# Patient Record
Sex: FEMALE | Race: OTHER
[De-identification: names, ages, dates, MRNs, and addresses within clinical notes are randomized per-mention and may not be internally consistent; named-entity substitution may affect disease eponyms.]

---

## 2019-01-22 ENCOUNTER — APPOINTMENT (OUTPATIENT)
Dept: OBGYN | Facility: CLINIC | Age: 59
End: 2019-01-22
Payer: MEDICAID

## 2019-01-22 VITALS
DIASTOLIC BLOOD PRESSURE: 65 MMHG | SYSTOLIC BLOOD PRESSURE: 110 MMHG | HEIGHT: 60 IN | BODY MASS INDEX: 23.56 KG/M2 | WEIGHT: 120 LBS

## 2019-01-22 DIAGNOSIS — Z78.9 OTHER SPECIFIED HEALTH STATUS: ICD-10-CM

## 2019-01-22 DIAGNOSIS — Z82.49 FAMILY HISTORY OF ISCHEMIC HEART DISEASE AND OTHER DISEASES OF THE CIRCULATORY SYSTEM: ICD-10-CM

## 2019-01-22 DIAGNOSIS — Z87.891 PERSONAL HISTORY OF NICOTINE DEPENDENCE: ICD-10-CM

## 2019-01-22 PROCEDURE — 99386 PREV VISIT NEW AGE 40-64: CPT

## 2019-01-22 NOTE — CHIEF COMPLAINT
[Initial Visit] : initial GYN visit [FreeTextEntry1] : Pt of Dr. Valente's. last exam 6/2017, at that time reported an affair by , std testing negative.  got tested 12/17 negative. They are working it out.\par Vit d 5000 qd\par Had MAURIZIO in 2000 for fibroids, has ovaries.\par

## 2019-01-22 NOTE — PHYSICAL EXAM
[Awake] : awake [Alert] : alert [Acute Distress] : no acute distress [LAD] : no lymphadenopathy [Thyroid Nodule] : no thyroid nodule [Goiter] : no goiter [Mass] : no breast mass [Nipple Discharge] : no nipple discharge [Axillary LAD] : no axillary lymphadenopathy [Soft] : soft [Tender] : non tender [Distended] : not distended [H/Smegaly] : no hepatosplenomegaly [Oriented x3] : oriented to person, place, and time [Depressed Mood] : not depressed [Flat Affect] : affect not flat [Vulvar Atrophy] : vulvar atrophy [Normal] : vagina [Atrophy] : atrophy [No Bleeding] : there was no active vaginal bleeding [Absent] : absent [Uterine Adnexae] : were not tender and not enlarged [No Tenderness] : no rectal tenderness [Occult Blood] : occult blood test from digital rectal exam was negative [RRR, No Murmurs] : RRR, no murmurs [CTAB] : CTAB

## 2019-01-22 NOTE — HISTORY OF PRESENT ILLNESS
[___ Year(s) Ago] : [unfilled] year(s) ago [Good] : being in good health [Healthy Diet] : a healthy diet [Regular Exercise] : regular exercise [Last Mammogram ___] : Last Mammogram was [unfilled] [Last Bone Density ___] : Last bone density studies [unfilled] [Last Colonoscopy ___] : Last colonoscopy [unfilled] [Postmenopausal] : is postmenopausal [Sexually Active] : is sexually active

## 2019-02-18 DIAGNOSIS — R92.2 INCONCLUSIVE MAMMOGRAM: ICD-10-CM

## 2020-10-26 ENCOUNTER — APPOINTMENT (OUTPATIENT)
Dept: GASTROENTEROLOGY | Facility: CLINIC | Age: 60
End: 2020-10-26

## 2020-10-29 ENCOUNTER — APPOINTMENT (OUTPATIENT)
Dept: GASTROENTEROLOGY | Facility: CLINIC | Age: 60
End: 2020-10-29
Payer: COMMERCIAL

## 2020-10-29 VITALS
HEART RATE: 60 BPM | HEIGHT: 60 IN | WEIGHT: 122 LBS | BODY MASS INDEX: 23.95 KG/M2 | DIASTOLIC BLOOD PRESSURE: 86 MMHG | SYSTOLIC BLOOD PRESSURE: 137 MMHG

## 2020-10-29 DIAGNOSIS — Z87.19 PERSONAL HISTORY OF OTHER DISEASES OF THE DIGESTIVE SYSTEM: ICD-10-CM

## 2020-10-29 DIAGNOSIS — G89.29 RIGHT LOWER QUADRANT PAIN: ICD-10-CM

## 2020-10-29 DIAGNOSIS — K21.9 GASTRO-ESOPHAGEAL REFLUX DISEASE W/OUT ESOPHAGITIS: ICD-10-CM

## 2020-10-29 DIAGNOSIS — R10.31 RIGHT LOWER QUADRANT PAIN: ICD-10-CM

## 2020-10-29 PROCEDURE — 99072 ADDL SUPL MATRL&STAF TM PHE: CPT

## 2020-10-29 PROCEDURE — 99213 OFFICE O/P EST LOW 20 MIN: CPT

## 2020-10-29 NOTE — ASSESSMENT
[FreeTextEntry1] : 61 yo female with exacerbation of IBS and GERD. Will continue PPI. Obtain CT for evaluation of right sided abdominal pain.

## 2020-10-29 NOTE — PHYSICAL EXAM

## 2020-10-29 NOTE — HISTORY OF PRESENT ILLNESS
[de-identified] : Ms. ANA WALLACE is a 60 year old female with history of irritable bowel syndrome and GERD. Patient has been doing clinically well until last week when after eating and taken out she developed abdominal pain and bloating. This is associated with increasing GERD symptoms. Patient has noted no anorexia or fevers. She has had some right-sided pain and is concerned about the etiology.\par

## 2021-02-04 ENCOUNTER — EMERGENCY (EMERGENCY)
Facility: HOSPITAL | Age: 61
LOS: 0 days | Discharge: OTHER INSTITUTION | End: 2021-02-04
Attending: EMERGENCY MEDICINE
Payer: COMMERCIAL

## 2021-02-04 ENCOUNTER — INPATIENT (INPATIENT)
Facility: HOSPITAL | Age: 61
LOS: 7 days | Discharge: HOME | DRG: 40 | End: 2021-02-12
Attending: HOSPITALIST | Admitting: PSYCHIATRY & NEUROLOGY
Payer: COMMERCIAL

## 2021-02-04 ENCOUNTER — TRANSCRIPTION ENCOUNTER (OUTPATIENT)
Age: 61
End: 2021-02-04

## 2021-02-04 VITALS
TEMPERATURE: 98 F | DIASTOLIC BLOOD PRESSURE: 85 MMHG | OXYGEN SATURATION: 97 % | RESPIRATION RATE: 20 BRPM | HEART RATE: 77 BPM | SYSTOLIC BLOOD PRESSURE: 116 MMHG

## 2021-02-04 VITALS
HEART RATE: 101 BPM | SYSTOLIC BLOOD PRESSURE: 138 MMHG | TEMPERATURE: 98 F | WEIGHT: 128.53 LBS | RESPIRATION RATE: 14 BRPM | HEIGHT: 63 IN | DIASTOLIC BLOOD PRESSURE: 73 MMHG | OXYGEN SATURATION: 100 %

## 2021-02-04 VITALS
DIASTOLIC BLOOD PRESSURE: 68 MMHG | RESPIRATION RATE: 18 BRPM | HEART RATE: 79 BPM | HEIGHT: 63 IN | SYSTOLIC BLOOD PRESSURE: 137 MMHG | OXYGEN SATURATION: 98 %

## 2021-02-04 DIAGNOSIS — I63.312 CEREBRAL INFARCTION DUE TO THROMBOSIS OF LEFT MIDDLE CEREBRAL ARTERY: ICD-10-CM

## 2021-02-04 DIAGNOSIS — R47.01 APHASIA: ICD-10-CM

## 2021-02-04 DIAGNOSIS — I63.9 CEREBRAL INFARCTION, UNSPECIFIED: ICD-10-CM

## 2021-02-04 DIAGNOSIS — Z20.822 CONTACT WITH AND (SUSPECTED) EXPOSURE TO COVID-19: ICD-10-CM

## 2021-02-04 LAB
ALBUMIN SERPL ELPH-MCNC: 4.3 G/DL — SIGNIFICANT CHANGE UP (ref 3.3–5)
ALP SERPL-CCNC: 54 U/L — SIGNIFICANT CHANGE UP (ref 40–120)
ALT FLD-CCNC: 31 U/L — SIGNIFICANT CHANGE UP (ref 12–78)
ANION GAP SERPL CALC-SCNC: 9 MMOL/L — SIGNIFICANT CHANGE UP (ref 5–17)
APPEARANCE UR: CLEAR — SIGNIFICANT CHANGE UP
APTT BLD: 30.8 SEC — SIGNIFICANT CHANGE UP (ref 27.5–35.5)
AST SERPL-CCNC: 22 U/L — SIGNIFICANT CHANGE UP (ref 15–37)
BASOPHILS # BLD AUTO: 0.05 K/UL — SIGNIFICANT CHANGE UP (ref 0–0.2)
BASOPHILS NFR BLD AUTO: 0.9 % — SIGNIFICANT CHANGE UP (ref 0–2)
BILIRUB SERPL-MCNC: 0.5 MG/DL — SIGNIFICANT CHANGE UP (ref 0.2–1.2)
BILIRUB UR-MCNC: NEGATIVE — SIGNIFICANT CHANGE UP
BUN SERPL-MCNC: 18 MG/DL — SIGNIFICANT CHANGE UP (ref 7–23)
CALCIUM SERPL-MCNC: 9.2 MG/DL — SIGNIFICANT CHANGE UP (ref 8.5–10.1)
CHLORIDE SERPL-SCNC: 108 MMOL/L — SIGNIFICANT CHANGE UP (ref 96–108)
CO2 SERPL-SCNC: 22 MMOL/L — SIGNIFICANT CHANGE UP (ref 22–31)
COLOR SPEC: YELLOW — SIGNIFICANT CHANGE UP
CREAT SERPL-MCNC: 0.79 MG/DL — SIGNIFICANT CHANGE UP (ref 0.5–1.3)
DIFF PNL FLD: ABNORMAL
EOSINOPHIL # BLD AUTO: 0.04 K/UL — SIGNIFICANT CHANGE UP (ref 0–0.5)
EOSINOPHIL NFR BLD AUTO: 0.7 % — SIGNIFICANT CHANGE UP (ref 0–6)
GLUCOSE SERPL-MCNC: 126 MG/DL — HIGH (ref 70–99)
GLUCOSE UR QL: NEGATIVE MG/DL — SIGNIFICANT CHANGE UP
HCT VFR BLD CALC: 41.6 % — SIGNIFICANT CHANGE UP (ref 34.5–45)
HGB BLD-MCNC: 14.4 G/DL — SIGNIFICANT CHANGE UP (ref 11.5–15.5)
IMM GRANULOCYTES NFR BLD AUTO: 0.2 % — SIGNIFICANT CHANGE UP (ref 0–1.5)
INR BLD: 0.98 RATIO — SIGNIFICANT CHANGE UP (ref 0.88–1.16)
KETONES UR-MCNC: NEGATIVE — SIGNIFICANT CHANGE UP
LEUKOCYTE ESTERASE UR-ACNC: NEGATIVE — SIGNIFICANT CHANGE UP
LYMPHOCYTES # BLD AUTO: 2.86 K/UL — SIGNIFICANT CHANGE UP (ref 1–3.3)
LYMPHOCYTES # BLD AUTO: 49.3 % — HIGH (ref 13–44)
MCHC RBC-ENTMCNC: 31.6 PG — SIGNIFICANT CHANGE UP (ref 27–34)
MCHC RBC-ENTMCNC: 34.6 GM/DL — SIGNIFICANT CHANGE UP (ref 32–36)
MCV RBC AUTO: 91.4 FL — SIGNIFICANT CHANGE UP (ref 80–100)
MONOCYTES # BLD AUTO: 0.37 K/UL — SIGNIFICANT CHANGE UP (ref 0–0.9)
MONOCYTES NFR BLD AUTO: 6.4 % — SIGNIFICANT CHANGE UP (ref 2–14)
NEUTROPHILS # BLD AUTO: 2.47 K/UL — SIGNIFICANT CHANGE UP (ref 1.8–7.4)
NEUTROPHILS NFR BLD AUTO: 42.5 % — LOW (ref 43–77)
NITRITE UR-MCNC: NEGATIVE — SIGNIFICANT CHANGE UP
PH UR: 6.5 — SIGNIFICANT CHANGE UP (ref 5–8)
PLATELET # BLD AUTO: 273 K/UL — SIGNIFICANT CHANGE UP (ref 150–400)
POTASSIUM SERPL-MCNC: 3.2 MMOL/L — LOW (ref 3.5–5.3)
POTASSIUM SERPL-SCNC: 3.2 MMOL/L — LOW (ref 3.5–5.3)
PROT SERPL-MCNC: 7.6 GM/DL — SIGNIFICANT CHANGE UP (ref 6–8.3)
PROT UR-MCNC: NEGATIVE MG/DL — SIGNIFICANT CHANGE UP
PROTHROM AB SERPL-ACNC: 11.5 SEC — SIGNIFICANT CHANGE UP (ref 10.6–13.6)
RBC # BLD: 4.55 M/UL — SIGNIFICANT CHANGE UP (ref 3.8–5.2)
RBC # FLD: 11.8 % — SIGNIFICANT CHANGE UP (ref 10.3–14.5)
SARS-COV-2 RNA SPEC QL NAA+PROBE: SIGNIFICANT CHANGE UP
SODIUM SERPL-SCNC: 139 MMOL/L — SIGNIFICANT CHANGE UP (ref 135–145)
SP GR SPEC: 1 — LOW (ref 1.01–1.02)
TROPONIN I SERPL-MCNC: <0.015 NG/ML — SIGNIFICANT CHANGE UP (ref 0.01–0.04)
UROBILINOGEN FLD QL: NEGATIVE MG/DL — SIGNIFICANT CHANGE UP
WBC # BLD: 5.8 K/UL — SIGNIFICANT CHANGE UP (ref 3.8–10.5)
WBC # FLD AUTO: 5.8 K/UL — SIGNIFICANT CHANGE UP (ref 3.8–10.5)

## 2021-02-04 PROCEDURE — 70450 CT HEAD/BRAIN W/O DYE: CPT | Mod: 26,77

## 2021-02-04 PROCEDURE — 70496 CT ANGIOGRAPHY HEAD: CPT

## 2021-02-04 PROCEDURE — 81001 URINALYSIS AUTO W/SCOPE: CPT

## 2021-02-04 PROCEDURE — 70498 CT ANGIOGRAPHY NECK: CPT

## 2021-02-04 PROCEDURE — 86900 BLOOD TYPING SEROLOGIC ABO: CPT

## 2021-02-04 PROCEDURE — 71045 X-RAY EXAM CHEST 1 VIEW: CPT | Mod: 26

## 2021-02-04 PROCEDURE — 99221 1ST HOSP IP/OBS SF/LOW 40: CPT | Mod: AI

## 2021-02-04 PROCEDURE — 85025 COMPLETE CBC W/AUTO DIFF WBC: CPT

## 2021-02-04 PROCEDURE — 99223 1ST HOSP IP/OBS HIGH 75: CPT

## 2021-02-04 PROCEDURE — 70498 CT ANGIOGRAPHY NECK: CPT | Mod: 26

## 2021-02-04 PROCEDURE — 0042T: CPT

## 2021-02-04 PROCEDURE — U0003: CPT

## 2021-02-04 PROCEDURE — 86901 BLOOD TYPING SEROLOGIC RH(D): CPT

## 2021-02-04 PROCEDURE — 99291 CRITICAL CARE FIRST HOUR: CPT | Mod: 25

## 2021-02-04 PROCEDURE — 99285 EMERGENCY DEPT VISIT HI MDM: CPT

## 2021-02-04 PROCEDURE — 84484 ASSAY OF TROPONIN QUANT: CPT

## 2021-02-04 PROCEDURE — 37195 THROMBOLYTIC THERAPY STROKE: CPT

## 2021-02-04 PROCEDURE — 99291 CRITICAL CARE FIRST HOUR: CPT

## 2021-02-04 PROCEDURE — 70450 CT HEAD/BRAIN W/O DYE: CPT

## 2021-02-04 PROCEDURE — 70496 CT ANGIOGRAPHY HEAD: CPT | Mod: 26

## 2021-02-04 PROCEDURE — 93010 ELECTROCARDIOGRAM REPORT: CPT

## 2021-02-04 PROCEDURE — 85730 THROMBOPLASTIN TIME PARTIAL: CPT

## 2021-02-04 PROCEDURE — 71045 X-RAY EXAM CHEST 1 VIEW: CPT

## 2021-02-04 PROCEDURE — U0005: CPT

## 2021-02-04 PROCEDURE — 85610 PROTHROMBIN TIME: CPT

## 2021-02-04 PROCEDURE — 80053 COMPREHEN METABOLIC PANEL: CPT

## 2021-02-04 PROCEDURE — 82962 GLUCOSE BLOOD TEST: CPT

## 2021-02-04 PROCEDURE — 87086 URINE CULTURE/COLONY COUNT: CPT

## 2021-02-04 PROCEDURE — 36415 COLL VENOUS BLD VENIPUNCTURE: CPT

## 2021-02-04 PROCEDURE — 86850 RBC ANTIBODY SCREEN: CPT

## 2021-02-04 PROCEDURE — 93005 ELECTROCARDIOGRAM TRACING: CPT

## 2021-02-04 RX ORDER — INFLUENZA VIRUS VACCINE 15; 15; 15; 15 UG/.5ML; UG/.5ML; UG/.5ML; UG/.5ML
0.5 SUSPENSION INTRAMUSCULAR ONCE
Refills: 0 | Status: DISCONTINUED | OUTPATIENT
Start: 2021-02-04 | End: 2021-02-12

## 2021-02-04 RX ORDER — SODIUM CHLORIDE 9 MG/ML
1000 INJECTION INTRAMUSCULAR; INTRAVENOUS; SUBCUTANEOUS
Refills: 0 | Status: DISCONTINUED | OUTPATIENT
Start: 2021-02-04 | End: 2021-02-05

## 2021-02-04 RX ORDER — ALTEPLASE 100 MG
5.2 KIT INTRAVENOUS ONCE
Refills: 0 | Status: COMPLETED | OUTPATIENT
Start: 2021-02-04 | End: 2021-02-04

## 2021-02-04 RX ORDER — ALTEPLASE 100 MG
47.2 KIT INTRAVENOUS ONCE
Refills: 0 | Status: COMPLETED | OUTPATIENT
Start: 2021-02-04 | End: 2021-02-04

## 2021-02-04 RX ORDER — ACETAMINOPHEN 500 MG
650 TABLET ORAL ONCE
Refills: 0 | Status: COMPLETED | OUTPATIENT
Start: 2021-02-04 | End: 2021-02-04

## 2021-02-04 RX ORDER — ATORVASTATIN CALCIUM 80 MG/1
80 TABLET, FILM COATED ORAL AT BEDTIME
Refills: 0 | Status: DISCONTINUED | OUTPATIENT
Start: 2021-02-04 | End: 2021-02-05

## 2021-02-04 RX ADMIN — SODIUM CHLORIDE 75 MILLILITER(S): 9 INJECTION INTRAMUSCULAR; INTRAVENOUS; SUBCUTANEOUS at 18:32

## 2021-02-04 RX ADMIN — ALTEPLASE 5.2 MILLIGRAM(S): KIT at 10:34

## 2021-02-04 RX ADMIN — ALTEPLASE 47.2 MILLIGRAM(S): KIT at 10:40

## 2021-02-04 RX ADMIN — ALTEPLASE 47.2 MILLIGRAM(S): KIT at 11:40

## 2021-02-04 RX ADMIN — Medication 650 MILLIGRAM(S): at 16:20

## 2021-02-04 RX ADMIN — ATORVASTATIN CALCIUM 80 MILLIGRAM(S): 80 TABLET, FILM COATED ORAL at 22:32

## 2021-02-04 RX ADMIN — ALTEPLASE 312 MILLIGRAM(S): KIT at 10:33

## 2021-02-04 NOTE — ED PROVIDER NOTE - CLINICAL SUMMARY MEDICAL DECISION MAKING FREE TEXT BOX
59 y/o female no known pmhx brought in by EMS for aphasia, symptoms started around 920. cod stroke called on arrival. finger stick in the 90s. normal strength sensation coordination however pt is aphasic, nih 2. will ct, cta, neuro, admit.

## 2021-02-04 NOTE — H&P ADULT - NSHPSOCIALHISTORY_GEN_ALL_CORE
, working as a , quit tobacco smoking 25 years ago, endorses 1 glass of wine per day, denies illicit drug use

## 2021-02-04 NOTE — ED PROVIDER NOTE - CLINICAL SUMMARY MEDICAL DECISION MAKING FREE TEXT BOX
59yo F denies pmh presents as transfer from [place] for L MCA stroke, s/p tPA 10:30am. Repeat CT head no contrast. Admit to neuro ICU.

## 2021-02-04 NOTE — ED PROVIDER NOTE - PHYSICAL EXAMINATION
Constitutional: NAD AAOx3  Eyes: PERRLA EOMI  Head: Normocephalic atraumatic  Mouth: MMM  Cardiac: regular rate   Resp: Lungs CTAB  GI: Abd s/nt/nd  Neuro: CN2-12 intact, nih 2. normal strength sensation and coordination.   Skin: No rashes Constitutional: mild distress AAOx3  Eyes: PERRLA EOMI  Head: Normocephalic atraumatic  Mouth: MMM  Cardiac: regular rate   Resp: Lungs CTAB  GI: Abd s/nt/nd  Neuro: CN2-12 intact, nih 2. normal strength sensation and coordination.   Skin: No rashes

## 2021-02-04 NOTE — CONSULT NOTE ADULT - ASSESSMENT
The patient is a 60y Female who is followed by neurology because of stroke    Left MCA stroke  symptoms resolved post alteplase  no clear risk factors  NSICU admission for post alteplase vitals and neurochecks per protocol  no antiplatelets or anticoagulant for 24 hours after alteplase administration  repeat head CT  24 hours after alteplase administration  MRI brain  TTE and eventual MICHELE/ILR likely  hypercoag workup as outpatient since she received alteplase  PT/OT/Speech therapy  NPO pending swallow eval  keep BP under 180/105  lipid panel  goal LDL <70  statin if LDL >70 and passes swallow eval  DVT ppx    discussed with Dr Adriano Boyer    will follow with you    Sheldon Randolph MD PhD   [id number]

## 2021-02-04 NOTE — H&P ADULT - ASSESSMENT
60 year old female with no PMH presents to [facility] with stroke like symptoms s/p TPA at 1035. CTA/CTP with thrombus distal to L MCA bifurication. Symptoms resolved s/p TPA.   NIH 0. MrS 0.     PLAN:  Neuro:  -s/p TPA 2/4 at 1035  -Admit to neuro ICU  -stroke neuro checks q1  -repeat CTH in 24 hours 2/5 at 1030 AM  -MRI  -pending stroke work up  -pending dysphagia screen  -PT/OT/ Speech  -if CTH negative for heme tomorrow, okay for aspirin daily    CV:  --180  -pending TTE  -Monitor on Telemetry  -pending lipid panel   chol___     LDL___    Respiratory:  -RA  -SPO2 >88  -Incentive Spirometry    GI:  -pending dysphagia screen    :  -voids    Heme:  -s/p TPA  -SCDS  -hold aspirin for 24 hours, repeat CT tomorrow, if stable and no bleed on CT, ok for aspirin    Endo:  pending A1C___ TSH___     ID:  Afebrile

## 2021-02-04 NOTE — H&P ADULT - ATTENDING COMMENTS
Pt seen and examined. Agree with above assessment and plan    post-tpa protocol  L M2 occlusion noted on CTA  MRI pending

## 2021-02-04 NOTE — CONSULT NOTE ADULT - ASSESSMENT
Patient is a 61 y/o female no known pmhx brought in by EMS for aphasia starting at 9:20 this morning. Patient unable to provide extensive history secondary to aphasia. She states she denies headache, weakness, numbness. visual changes. Head CT/CTA Brain reveals LEFT fronto-parietal penumbra and acute thrombus in Left MCA bifurcation.  NIHSS:2    A/P: L MCA stroke with acute thrombus  - Tpa pushed at 10:35am  - ED discuss transfer to [facility] for potential clot retrieval  - Neuro checks Q1 hour  - BP permissive   - NPO  - Discussed with ED team    Discussed with Dr. Bang

## 2021-02-04 NOTE — H&P ADULT - NSHPPHYSICALEXAM_GEN_ALL_CORE
Constitutional: NAD    Neuro  * Mental Status:  GCS 15:  E(4), V(5), M(6).  Awake, alert, oriented to conversation.  * Cranial Nerves: Cnii-Cnxii grossly intact. PERRL, EOMI, tongue midline, no gaze deviation  * Motor: RUE 4+/5, LUE 5/5, RLE 5/5, LLE 5/5, No drift  * Sensory: Sensation intact to light touch  * Reflexes: Not assessed  * Gait: Not assessed    Cardiovascular:  S1, S2 no murmurs appreciated.  Regular rate and rhythm.  Eyes: See neurologic examination with detailed examination of eyes.  ENT: No JVD, Trachea Midline.  Respiratory: Clear to auscultation.  Gastrointestinal: Soft, nontender, nondistended.  Genitourinary: [ ] Estrada, [ x ] No Estrada.   Musculoskeletal: No muscle wasting noted, (See neuorlogic assessment for full muscle strength assessment) No pretibial edema appreciated, no appreciable calf tenderness.  Skin:  Wound inspected, no redness, bleeding or drainage noted.    Hematologic / Lymph / Immunologic: No bleeding from IV sites or wounds, No lymphadenopathy, No Hives or allergic type skin lesions Depressed

## 2021-02-04 NOTE — ED ADULT NURSE NOTE - OBJECTIVE STATEMENT
Pt BIBA for expressive aphasia and difficulty word finding.  noticed a change of speech around 0920 this am. On arrival pt a&ox2 having difficulty finding words, denies pain or discomfort. Denies recent surgeries, denies any recent illness, denies any weakness or numbness. Denies headache, blurred vision. MD De La Cruz at bedside, code stroke initiated at 1005. Pt to be taken to CT scan. BEVERLEY REDDYL.

## 2021-02-04 NOTE — ED PROVIDER NOTE - PROGRESS NOTE DETAILS
Scribe Jaclyn Casale for attending Dr De La Cruz: transfer center called regarding CTA. Scribe Jaclyn Casale for attending Dr De La Cruz: called by radiology again, CT with signs of clots in MCA, no inclusive but there is a penumbra. neurox at bedside. evaluated pt agrees with plan for TPA. discussed with pt, she had multiple questions which we answered, discussed risks and benefits, after discussion pt agreeable for TPA. TPA administered at 10:35. Scribe Jaclyn Casale for attending Dr De La Cruz: called by radiology, CT head without any signs of bleed. pharmacy at bedside mixing TPA. Scribe Jaclyn Casale for attending Dr De La Cruz: spoke with transfer center, they are having trouble getting in touch with neuro-intervention, they will call me back. spoke with [facility] Dr. Soler transfer to them. Nehemias De La Cruz M.D., Attending Physician

## 2021-02-04 NOTE — CONSULT NOTE ADULT - SUBJECTIVE AND OBJECTIVE BOX
HPI:  Patient is a 61 y/o female no known pmhx brought in by EMS for aphasia starting at 9:20 this morning. Patient unable to provide extensive history secondary to aphasia. She states she denies headache, weakness, numbness. visual changes. Denies DOAC use. Head CT/CTA Brain reveals LEFT fronto-parietal penumbra and acute thrombus in L MCA bifurcation.  NIHSS:2    PAST MEDICAL & SURGICAL HISTORY:  None    FAMILY HISTORY: unable to assess     Social Hx:  Nonsmoker, no drug or alcohol use    MEDICATIONS  (STANDING):       Allergies  No Known Allergies  Intolerances        ROS: Pertinent positives in HPI, all other ROS were reviewed and are negative.      Vital Signs Last 24 Hrs  T(C): 36.8 (04 Feb 2021 10:32), Max: 36.8 (04 Feb 2021 10:05)  T(F): 98.2 (04 Feb 2021 10:32), Max: 98.3 (04 Feb 2021 10:05)  HR: 99 (04 Feb 2021 10:32) (99 - 101)  BP: 140/81 (04 Feb 2021 10:32) (138/73 - 140/81)  BP(mean): 98 (04 Feb 2021 10:32) (98 - 98)  RR: 20 (04 Feb 2021 10:32) (14 - 20)  SpO2: 100% (04 Feb 2021 10:32) (100% - 100%)      PHYSICAL EXAM:  Constitutional: awake and alert  HEENT: PERRLA, EOMI  Neck: Supple.  Respiratory: Breath sounds are clear bilaterally  Cardiovascular: S1 and S2, regular rhythm  Gastrointestinal: soft, nontender  Extremities:  no edema  Vascular: Caritid Bruit - no  Musculoskeletal: no joint swelling/tenderness, no abnormal movements  Skin: No rashes    Neurological exam:  HF: Awake, alert. Expressive aphasia, mild dysarthria.   CN: ESTEFANI, EOMI, VFF, facial sensation normal, no NLFD, tongue midline, Palate moves equally, SCM equal bilaterally  Motor: No pronator drift, Strength 5/5 in all 4 ext, normal bulk and tone, no tremor, rigidity or bradykinesia.    Sens: Intact to light touch    Reflexes: Symmetric and normal . BJ 2+, BR 2+, KJ 2+, AJ 2+, downgoing toes b/l  Coord:  No FNFA, dysmetria, SANDY intact   Gait/Balance: Cannot test    NIHSS:2          Labs:                        14.4   5.80  )-----------( 273      ( 04 Feb 2021 10:09 )             41.6     02-04    139  |  108  |  18  ----------------------------<  126<H>  3.2<L>   |  22  |  0.79    Ca    9.2      04 Feb 2021 10:09    TPro  7.6  /  Alb  4.3  /  TBili  0.5  /  DBili  x   /  AST  22  /  ALT  31  /  AlkPhos  54  02-04        PT/INR - ( 04 Feb 2021 10:09 )   PT: 11.5 sec;   INR: 0.98 ratio         PTT - ( 04 Feb 2021 10:09 )  PTT:30.8 sec    Radiology report:  CT/CTA Brain Stroke Protocol (02.04.21 @ 10:12)  IMPRESSION:  No acute infarction. 67 ml penumbra in the left frontal parietal lobe. Acute thrombus just past the left MCA bifurcation at the level of the insular ribbon. Occluded left internal carotid artery 1.5 cm from its bifurcation extending cranially to the level of the petrous segment.

## 2021-02-04 NOTE — ED ADULT NURSE NOTE - CHIEF COMPLAINT QUOTE
patient presents with expressive aphasia last known well is 0920 am as per . Positive BEFAST= expressive asphasia

## 2021-02-04 NOTE — ED ADULT NURSE NOTE - OBJECTIVE STATEMENT
pt A&Ox4, transfer from [place] after TPA was given, LKW at 920am with  found her aphasic original NIH-2 , pt went to [place] and ct showed acute thrombus @Left MCA bifurcation and occluded left carotid artery pt given TPA @1030am Sx resolved and arrived in ED with NIH-0, resp even and unlabored no distress noted, abd soft NTND

## 2021-02-04 NOTE — ED PROVIDER NOTE - NS_ ATTENDINGSCRIBEDETAILS _ED_A_ED_FT
I, Nehemias De La Cruz MD,  performed the initial face to face bedside interview with this patient regarding history of present illness, review of symptoms and relevant past medical, social and family history.  I completed an independent physical examination.  I was the initial provider who evaluated this patient.  The history, relevant review of systems, past medical and surgical history, medical decision making, and physical examination was documented by the scribe in my presence and I attest to the accuracy of the documentation.

## 2021-02-04 NOTE — ED PROVIDER NOTE - NS ED ROS FT
Constitutional: no fever, sweats, and no chills.  Eyes: no pain, no redness, and no visual changes.  ENMT: no neck pain, no stiffness  CV: no chest pain, no edema.  Resp: no cough, no dyspnea  GI: no abdominal pain, no nausea and no vomiting.  MSK: no msk pain, no weakness  Skin: no lesions, and no rashes.  Neuro: no LOC, no headache, no sensory deficits, +aphasia

## 2021-02-04 NOTE — ED PROVIDER NOTE - OBJECTIVE STATEMENT
61 y/o female no known pmhx brought in by EMS for aphasia, symptoms started around 920. finger stick in trauma was in the 90s on arrival. Code stroke called.

## 2021-02-04 NOTE — H&P ADULT - HISTORY OF PRESENT ILLNESS
60 year old right hand dominant female with no PMHx transferred from  with aphasia and right arm weakness that started at 920 this am. She states she was feeling in her usual state of health up until this morning when she was unable to speak to her  and became weak with her right arm. CODE Stroke was called at , NIH 2 and she received TPA at 1035. CTA/ CTP revealing thrombus distal to the L MCA Bifurication, 67ml of penumbra to left frontoparietal lobe. Pt transferred to [facility] for possible mechanical thrombectomy. Upon arrival, pt with improved symptoms, NIH 0. Pt does endorse recent sinus infection 2 weeks ago and completing abx course amoxicillin. Pt denies HA, dizziness, paresthesias, visual changes, difficulty with word finding, weakness.     NIH 0  mrS 0

## 2021-02-04 NOTE — CONSULT NOTE ADULT - SUBJECTIVE AND OBJECTIVE BOX
[facility]                                     Neurology at [place]                                 Tomasa Recinos, & Noé                                  [address]                                        [place], [place], [address]                                             [phone]    CC: aphasia  HPI:  The patient is a 60y Female who presented as transfer from [facility] for stroke s/p alteplase.  At 0920 she became acutely aphasic with right face and arm weakness/funny feeling.  She had NIHSS=2. She had head CT that did not show blood or large stroke and was given alteplase at 1035, CTA head showed distal left MCA thrombus and CTP showed 67 cc penumbra.  she was ultimately transferred to [facility] for further care.    PAST MEDICAL & SURGICAL HISTORY:  No pertinent past medical history    No significant past surgical history        MEDICATIONS  (STANDING):  atorvastatin 80 milliGRAM(s) Oral at bedtime  sodium chloride 0.9%. 1000 milliLiter(s) (75 mL/Hr) IV Continuous <Continuous>    MEDICATIONS  (PRN):      Allergies    No Known Allergies    Intolerances        SOCIAL HISTORY:  no tob,   no alcohol   no drugs    FAMILY HISTORY:  FH: stroke/TIA  father    FH: myocardial infarction (Father)  father          ROS: 14 point ROS negative other than what is present in HPI or below    Vital Signs Last 24 Hrs  T(C): 36.7 (04 Feb 2021 13:03), Max: 36.8 (04 Feb 2021 10:05)  T(F): 98 (04 Feb 2021 13:03), Max: 98.3 (04 Feb 2021 10:05)  HR: 75 (04 Feb 2021 17:00) (64 - 101)  BP: 108/72 (04 Feb 2021 17:00) (107/91 - 140/81)  BP(mean): 98 (04 Feb 2021 10:32) (98 - 98)  RR: 17 (04 Feb 2021 17:00) (14 - 21)  SpO2: 98% (04 Feb 2021 17:00) (97% - 100%)      General: NAD    Detailed Neurologic Exam:    Mental status: The patient is awake and alert and has normal attention span.  The patient is fully oriented in 3 spheres. The patient is oriented to current events. The patient is able to name objects, follow commands, repeat sentences.    Cranial nerves: Pupils equal and react symmetrically to light. There is no visual field deficit to confrontation. Extraocular motion is full with no nystagmus. There is no ptosis. Facial sensation is intact. Facial musculature is symmetric. Palate elevates symmetrically. Shoulder shrug is normal. Tongue is midline.    Motor: There is normal bulk and tone.  There is no tremor.  Strength is 5/5 in the right arm and leg.   Strength is 5/5 in the left arm and leg.    Sensation: Intact to light touch and pin in 4 extremities    Reflexes: 2+ throughout and plantar responses are flexor.    Cerebellar: There is no dysmetria on finger to nose testing.    Gait : deferred    NIH SS:  DATE: 2/4/21  TIME: 1445  1A: Level of consciousness (0-3): 0  1B: Questions (0-2): 0  1C: Commands (0-2): 0  2: Gaze (0-2): 0  3: Visual fields (0-3): 0  4: Facial palsy (0-3): 0  MOTOR:  5A: Left arm motor drift (0-4): 0  5B: Right arm motor drift (0-4): 0  6A: Left leg motor drift (0-4): 0  6B: Right leg motor drift (0-4): 0  7: Limb ataxia (0-2): 0  SENSORY:  8: Sensation (0-2): 0  SPEECH:  9: Language (0-3): 0  10: Dysarthria (0-2): 0  EXTINCTION:  11: Extinction/inattention (0-2): 0    TOTAL SCORE: 0    prehospital mRS= 0      LABS:                         14.4   5.80  )-----------( 273      ( 04 Feb 2021 10:09 )             41.6       02-04    139  |  108  |  18  ----------------------------<  126<H>  3.2<L>   |  22  |  0.79    Ca    9.2      04 Feb 2021 10:09    TPro  7.6  /  Alb  4.3  /  TBili  0.5  /  DBili  x   /  AST  22  /  ALT  31  /  AlkPhos  54  02-04      PT/INR - ( 04 Feb 2021 10:09 )   PT: 11.5 sec;   INR: 0.98 ratio         PTT - ( 04 Feb 2021 10:09 )  PTT:30.8 sec    RADIOLOGY & ADDITIONAL STUDIES (independently reviewed unless otherwise noted):  CT head: no acute CVA, mass or blood  CTA head: no aneurysm, AVM,  or sig stenosis in COW, distal left MCA thrombus  CTA neck: no sig carotid or vertebral stenosis  CT Perfusion head - CBF<30% volume 0ml, Tmax>6s volume =67ml

## 2021-02-04 NOTE — STROKE CODE NOTE - MRS SCORE
Strong peripheral pulses
(1) No significant disability. Able to carry out all usual activities, despite some symptoms.

## 2021-02-04 NOTE — H&P ADULT - NSHPREVIEWOFSYSTEMS_GEN_ALL_CORE
Constitutional:  denies fever, chills, generalized weakness  Eyes: No double vision, no change in visual acuity, no blurry vision, no occular discharge  Neurologic: right arm weakness improved, does endorse frontal sinus congestion improved from 2 weeks ago s/p abx, no seizure reported  Ears, nose, mouth throat:  No ottorrhea. No change in hearing, No anosmia, No oral lesions, No sore throat  Cardiovascular: No palpitations, no chest pain, no nocturnal or positional dyspnea.  Respiratory: No shortness of breath, No Cough  Gastrointestinal: No change in bowel habits, no change in appetite  Genitourinary: No Frequency, No Dysuria, no Hematuria  Musculoskeletal: No muscle wasting, No new weakness  Psych: No changes in mood, Denies drug use, Denies history of psychiatric illness  Integumentary: Denies new skin lesions  Endocrine: Denies history of DM, denies history of thyroid disease, No heat or cold intolerance  Heme/Lymph: No use of antiplatelet/anticoagulant  Allergic / Immune: No active allergies unless otherwise specified in medical chart    All other systems reviewed and are negative

## 2021-02-04 NOTE — H&P ADULT - NSHPLABSRESULTS_GEN_ALL_CORE
14.4   5.80  )-----------( 273      ( 04 Feb 2021 10:09 )             41.6       PT/INR - ( 04 Feb 2021 10:09 )   PT: 11.5 sec;   INR: 0.98 ratio         PTT - ( 04 Feb 2021 10:09 )  PTT:30.8 sec    02-04    139  |  108  |  18  ----------------------------<  126<H>  3.2<L>   |  22  |  0.79    Ca    9.2      04 Feb 2021 10:09    TPro  7.6  /  Alb  4.3  /  TBili  0.5  /  DBili  x   /  AST  22  /  ALT  31  /  AlkPhos  54  02-04    < from: CT Perfusion w/ Maps w/ IV Cont (02.04.21 @ 10:24) >    TA BRAIN: Thrombus just past the left MCA bifurcation at the level of the insular ribbon on image 33 of series 3  The [place] of [place] and vertebrobasilar system are otherwise in normal without evidence of stenosis, additional occlusion or saccular aneurysm dilation. No evidence for arterial venous malformation. The right vertebral artery is dominant.    CTA NECK:  Occluded left internal carotid artery 1.5 cm from its bifurcation extending cranially to the level of the petrous segment.  A left-sided aortic arch is demonstrated. There is normal relationship to the great vessels. The common carotid arteries, right internal carotid artery and vertebral arteries are normal in caliber. No evidence of stenosis, occlusion or saccular aneurysm dilation. The right vertebral artery is dominant.    CT PERFUSION:  Patient has only had less than 2 hours of symptoms may influence the CT perfusion.    CBF<30% volume: 0 ml  Tmax>6.0 s volume: 67 ml  Mismatch volume: 67 ml  Mismatch ratio: Infinite    < end of copied text >

## 2021-02-04 NOTE — ED PROVIDER NOTE - OBJECTIVE STATEMENT
61yo F denies pmh presents as transfer from [place] for L MCA stroke. At 9:20am, patient developed aphasia and drooling. Presented to [place], received tPA at 10:30am. L MCA stroke on imaging. Transferred for further neuro care. Currently asymptomatic, NIHSS 0. Denies paresthesias, weakness, HA. Speaking clearly, AAOx3. 60 year old F denies pmh presents as transfer from [place] for L MCA stroke. At 9:20am, patient developed aphasia and drooling. Presented to [place], received tPA at 10:30am. L MCA stroke on imaging. Transferred for further neuro care. Currently asymptomatic, NIHSS 0. Denies paresthesias, weakness, HA. Speaking clearly, AAOx3.

## 2021-02-04 NOTE — ED PROVIDER NOTE - FAMILY HISTORY
FH: stroke, father     Father  Still living? Unknown  FH: myocardial infarction, Age at diagnosis: Age Unknown

## 2021-02-04 NOTE — ED PROVIDER NOTE - NS ED ROS FT
Review of Systems:  	•	CONSTITUTIONAL: no fever  	•	SKIN: no rash  	•	RESPIRATORY: no shortness of breath  	•	CARDIAC: no chest pain  	•	GI:  no abd pain, no nausea, no vomiting, no diarrhea  	•	GENITO-URINARY:  no dysuria  	•	MUSCULOSKELETAL:  no back pain  	•	NEUROLOGIC: no weakness +aphasia   	•	ALLERGY: no rhinorrhea  	•	PSYSCHIATRIC: appropriate concern about symptoms Constitutional: No fever or chills  Eyes: No visual changes  HEENT: No throat pain  CV: No chest pain  Resp: No SOB no cough  GI: No abd pain, nausea or vomiting  : No dysuria  MSK: No musculoskeletal pain  Skin: No rash  Neuro: No headache +aphasia

## 2021-02-04 NOTE — ED PROVIDER NOTE - PHYSICAL EXAMINATION
General: well appearing, NAD  Head:  NC, AT  Eyes: EOMI, PERRLA  Cardiac: RRR, no m/r/g, no lower extremity edema  Respiratory: CTABL, no wheezes/rales/rhonchi, equal chest wall expansions  Abdomen: soft, ND, NT  MSK/Vascular: full ROM, distal pulses intact, soft compartments, warm extremities  Neuro: AAOx3, negative pronator drift, strength 5/5, sensation to light touch intact, finger to nose coordination intact, cranial nerves 2-12 intact  Psych: calm, cooperative, normal affect, speech clear and thought process linear

## 2021-02-05 LAB
A1C WITH ESTIMATED AVERAGE GLUCOSE RESULT: 5.1 % — SIGNIFICANT CHANGE UP (ref 4–5.6)
ANION GAP SERPL CALC-SCNC: 10 MMOL/L — SIGNIFICANT CHANGE UP (ref 5–17)
BUN SERPL-MCNC: 11 MG/DL — SIGNIFICANT CHANGE UP (ref 8–20)
CALCIUM SERPL-MCNC: 8.4 MG/DL — LOW (ref 8.6–10.2)
CHLORIDE SERPL-SCNC: 110 MMOL/L — HIGH (ref 98–107)
CHOLEST SERPL-MCNC: 169 MG/DL — SIGNIFICANT CHANGE UP
CO2 SERPL-SCNC: 22 MMOL/L — SIGNIFICANT CHANGE UP (ref 22–29)
CREAT SERPL-MCNC: 0.6 MG/DL — SIGNIFICANT CHANGE UP (ref 0.5–1.3)
CULTURE RESULTS: SIGNIFICANT CHANGE UP
ESTIMATED AVERAGE GLUCOSE: 100 MG/DL — SIGNIFICANT CHANGE UP (ref 68–114)
GLUCOSE SERPL-MCNC: 103 MG/DL — HIGH (ref 70–99)
HCT VFR BLD CALC: 36.8 % — SIGNIFICANT CHANGE UP (ref 34.5–45)
HCV AB S/CO SERPL IA: 0.07 S/CO — SIGNIFICANT CHANGE UP (ref 0–0.99)
HCV AB SERPL-IMP: SIGNIFICANT CHANGE UP
HDLC SERPL-MCNC: 64 MG/DL — SIGNIFICANT CHANGE UP
HGB BLD-MCNC: 12.6 G/DL — SIGNIFICANT CHANGE UP (ref 11.5–15.5)
LIPID PNL WITH DIRECT LDL SERPL: 93 MG/DL — SIGNIFICANT CHANGE UP
MAGNESIUM SERPL-MCNC: 2 MG/DL — SIGNIFICANT CHANGE UP (ref 1.6–2.6)
MCHC RBC-ENTMCNC: 31.4 PG — SIGNIFICANT CHANGE UP (ref 27–34)
MCHC RBC-ENTMCNC: 34.2 GM/DL — SIGNIFICANT CHANGE UP (ref 32–36)
MCV RBC AUTO: 91.8 FL — SIGNIFICANT CHANGE UP (ref 80–100)
NON HDL CHOLESTEROL: 105 MG/DL — SIGNIFICANT CHANGE UP
PHOSPHATE SERPL-MCNC: 3.2 MG/DL — SIGNIFICANT CHANGE UP (ref 2.4–4.7)
PLATELET # BLD AUTO: 222 K/UL — SIGNIFICANT CHANGE UP (ref 150–400)
POTASSIUM SERPL-MCNC: 3.6 MMOL/L — SIGNIFICANT CHANGE UP (ref 3.5–5.3)
POTASSIUM SERPL-SCNC: 3.6 MMOL/L — SIGNIFICANT CHANGE UP (ref 3.5–5.3)
RBC # BLD: 4.01 M/UL — SIGNIFICANT CHANGE UP (ref 3.8–5.2)
RBC # FLD: 11.6 % — SIGNIFICANT CHANGE UP (ref 10.3–14.5)
SODIUM SERPL-SCNC: 142 MMOL/L — SIGNIFICANT CHANGE UP (ref 135–145)
SPECIMEN SOURCE: SIGNIFICANT CHANGE UP
TRIGL SERPL-MCNC: 62 MG/DL — SIGNIFICANT CHANGE UP
TSH SERPL-MCNC: 2.51 UIU/ML — SIGNIFICANT CHANGE UP (ref 0.27–4.2)
WBC # BLD: 6.94 K/UL — SIGNIFICANT CHANGE UP (ref 3.8–10.5)
WBC # FLD AUTO: 6.94 K/UL — SIGNIFICANT CHANGE UP (ref 3.8–10.5)

## 2021-02-05 PROCEDURE — 70551 MRI BRAIN STEM W/O DYE: CPT | Mod: 26

## 2021-02-05 PROCEDURE — 99233 SBSQ HOSP IP/OBS HIGH 50: CPT

## 2021-02-05 PROCEDURE — 70450 CT HEAD/BRAIN W/O DYE: CPT | Mod: 26

## 2021-02-05 PROCEDURE — 93306 TTE W/DOPPLER COMPLETE: CPT | Mod: 26

## 2021-02-05 PROCEDURE — 99223 1ST HOSP IP/OBS HIGH 75: CPT

## 2021-02-05 PROCEDURE — 99232 SBSQ HOSP IP/OBS MODERATE 35: CPT

## 2021-02-05 PROCEDURE — 99222 1ST HOSP IP/OBS MODERATE 55: CPT

## 2021-02-05 RX ORDER — ATORVASTATIN CALCIUM 80 MG/1
80 TABLET, FILM COATED ORAL AT BEDTIME
Refills: 0 | Status: DISCONTINUED | OUTPATIENT
Start: 2021-02-05 | End: 2021-02-05

## 2021-02-05 RX ORDER — FLUTICASONE PROPIONATE 50 MCG
1 SPRAY, SUSPENSION NASAL
Refills: 0 | Status: DISCONTINUED | OUTPATIENT
Start: 2021-02-05 | End: 2021-02-12

## 2021-02-05 RX ORDER — CALCIUM GLUCONATE 100 MG/ML
1 VIAL (ML) INTRAVENOUS ONCE
Refills: 0 | Status: COMPLETED | OUTPATIENT
Start: 2021-02-05 | End: 2021-02-05

## 2021-02-05 RX ORDER — ENOXAPARIN SODIUM 100 MG/ML
40 INJECTION SUBCUTANEOUS
Refills: 0 | Status: DISCONTINUED | OUTPATIENT
Start: 2021-02-05 | End: 2021-02-06

## 2021-02-05 RX ORDER — ATORVASTATIN CALCIUM 80 MG/1
40 TABLET, FILM COATED ORAL AT BEDTIME
Refills: 0 | Status: DISCONTINUED | OUTPATIENT
Start: 2021-02-05 | End: 2021-02-12

## 2021-02-05 RX ORDER — FLUTICASONE PROPIONATE 50 MCG
1 SPRAY, SUSPENSION NASAL
Qty: 0 | Refills: 0 | DISCHARGE

## 2021-02-05 RX ORDER — SODIUM CHLORIDE 9 MG/ML
1000 INJECTION INTRAMUSCULAR; INTRAVENOUS; SUBCUTANEOUS ONCE
Refills: 0 | Status: COMPLETED | OUTPATIENT
Start: 2021-02-05 | End: 2021-02-05

## 2021-02-05 RX ORDER — POTASSIUM CHLORIDE 20 MEQ
40 PACKET (EA) ORAL ONCE
Refills: 0 | Status: COMPLETED | OUTPATIENT
Start: 2021-02-05 | End: 2021-02-05

## 2021-02-05 RX ORDER — ASPIRIN/CALCIUM CARB/MAGNESIUM 324 MG
81 TABLET ORAL DAILY
Refills: 0 | Status: DISCONTINUED | OUTPATIENT
Start: 2021-02-05 | End: 2021-02-12

## 2021-02-05 RX ORDER — ACETAMINOPHEN 500 MG
650 TABLET ORAL EVERY 6 HOURS
Refills: 0 | Status: DISCONTINUED | OUTPATIENT
Start: 2021-02-05 | End: 2021-02-12

## 2021-02-05 RX ADMIN — Medication 650 MILLIGRAM(S): at 11:54

## 2021-02-05 RX ADMIN — Medication 40 MILLIEQUIVALENT(S): at 06:41

## 2021-02-05 RX ADMIN — SODIUM CHLORIDE 1000 MILLILITER(S): 9 INJECTION INTRAMUSCULAR; INTRAVENOUS; SUBCUTANEOUS at 00:35

## 2021-02-05 RX ADMIN — Medication 81 MILLIGRAM(S): at 11:52

## 2021-02-05 RX ADMIN — Medication 100 GRAM(S): at 11:52

## 2021-02-05 RX ADMIN — Medication 650 MILLIGRAM(S): at 20:19

## 2021-02-05 RX ADMIN — ENOXAPARIN SODIUM 40 MILLIGRAM(S): 100 INJECTION SUBCUTANEOUS at 18:16

## 2021-02-05 RX ADMIN — ATORVASTATIN CALCIUM 40 MILLIGRAM(S): 80 TABLET, FILM COATED ORAL at 20:19

## 2021-02-05 NOTE — PROGRESS NOTE ADULT - ASSESSMENT
61 y/o F with no PMHx, 3-5K runner daily, presented to [facility] with aphasia, R arm weekend, s/p tPA; transferred to [facility] for possible mechanical thrombectomy.    #Left MCA Infarct   - right face and arm weakness, aphasia  -s/p tPA 2/4 at 1035 at [facility]  -Admitted to neuro ICU for post alteplase vitals and neuro-checks per protocol  - Repeat head CT 24 hours post tPA stable today  - MRI brain pending  - TTE with bubble study pending  - Hypercoagulable workup as outpatient since she received alteplase.  - PT/OT/Speech therapy.  - Keep BP under 180/105  - Lipid panel noted, A1c 5.1  - start ASA and statin today  - start DVT prophylaxis per neurology  - neuro recs appreciated  - cardiology recs appreciated  - PT/OT  - speech eval passed    #prophylaxis  - DVT - lovenox  - GI - none   59 y/o F with no PMHx, 3-5K runner daily, presented to [facility] with aphasia, R arm weekend, s/p tPA; transferred to [facility] for possible mechanical thrombectomy.    #Left MCA Infarct   - right face and arm weakness, aphasia  - s/p tPA 2/4 at 1035 at [facility]  - Admitted to neuro ICU for post alteplase vitals and neuro-checks per protocol  - Repeat head CT 24 hours post tPA stable today  - MRI brain pending  - TTE with bubble study pending  - Hypercoagulable workup as outpatient since she received alteplase.  - PT/OT/Speech therapy.  - Keep BP under 180/105  - Lipid panel noted, A1c 5.1  - start ASA and statin today  - start DVT prophylaxis per neurology  - neuro recs appreciated  - cardiology recs appreciated  - PT/OT  - speech eval passed    #prophylaxis  - DVT - lovenox  - GI - none   59 y/o F with no PMHx, 3-5K runner daily, presented to [facility] with aphasia, R arm weekend, s/p tPA; transferred to [facility] for possible mechanical thrombectomy.    #Left MCA Infarct   - right face and arm weakness, aphasia  - s/p tPA 2/4 at 1035 at [facility]  - Admitted to neuro ICU for post alteplase vitals and neuro-checks per protocol  - CTA neck showed left ICA occlusion, vascular sx consulted   - Repeat head CT 24 hours post tPA stable today  - MRI brain pending  - TTE with bubble study pending  - Hypercoagulable workup as outpatient since she received alteplase.  - PT/OT/Speech therapy.  - Keep BP under 180/105  - Lipid panel noted, A1c 5.1  - start ASA and statin today  - start DVT prophylaxis per neurology  - neuro recs appreciated  - cardiology recs appreciated  - PT/OT  - speech eval passed    #prophylaxis  - DVT - lovenox  - GI - none

## 2021-02-05 NOTE — PROGRESS NOTE ADULT - ASSESSMENT
ASSESSMENT/PLAN:    NEURO:  Activity: [] OOB as tolerated [] Bedrest [] PT [] OT [] PMNR [] Bed in Chair Position     PULM:    CV:  SBP goal    RENAL:  Fluids:    GI:  Diet:  GI prophylaxis [] not indicated [] PPI [] other:  Bowel regimen [] colace [] senna [] other:    ENDO:   Goal euglycemia (-180)    HEME/ONC:  VTE prophylaxis: [] SCDs [] chemoprophylaxis    ID: afebrile    SOCIAL/FAMILY:  [] updated at bedside [] family meeting    CODE STATUS:  [] Full Code [] DNR [] DNI [] Palliative/Comfort Care    DISPOSITION:  [] ICU [] Stroke Unit [] Floor [] EMU [] RCU [] PCU    [] Patient is at high risk of neurologic deterioration/death due to:     Time seen:  Time spent: ___ [] critical care minutes ASSESSMENT/PLAN:  60 year old female with no PMH presents to [facility] with stroke like symptoms s/p TPA at 1035. CTA/CTP with thrombus distal to L MCA bifurication. Symptoms resolved s/p TPA.   NIH 0. MrS 0.     PLAN:  Neuro:  -s/p TPA 2/4 at 1035  -Admit to neuro ICU  -stroke neuro checks q1  -repeat CTH in 24 hours 2/5 at 1030 AM  -MRI  -pending stroke work up  -pending dysphagia screen  -PT/OT/ Speech  -if CTH negative for heme tomorrow, okay for aspirin daily    CV:  --180  -pending TTE  -Monitor on Telemetry  -pending lipid panel     Respiratory:  -RA  -SPO2 >88  -Incentive Spirometry    GI:  -pending dysphagia screen    :  -voids    Heme:  -s/p TPA  -SCDS  -hold aspirin for 24 hours, repeat CT tomorrow, if stable and no bleed on CT, ok for aspirin    Endo:  pending A1C- TSH-     ID:  Afebrile    SOCIAL/FAMILY:  [X] updated at bedside    CODE STATUS:  [X] Full Code    DISPOSITION:  [X] ICU     [X] Patient is at high risk of neurologic deterioration/death due to:     Time spent: 35 critical care minutes ASSESSMENT/PLAN:  60 year old female with no PMH presents to [facility] with stroke like symptoms s/p TPA at 1035. CTA/CTP with thrombus distal to L MCA bifurication. Symptoms resolved s/p TPA.   NIH 0. MrS 0.     PLAN:  Neuro:  -s/p TPA 2/4 at 1035  -Admit to neuro ICU  -stroke neuro checks q2  -repeat CTH as above   -MRI  -pending stroke work up  dysphagia screen- passed  -PT/OT/ Speech   aspirin daily    CV:  - cardiac eval - ? Loop recorder   --180  -pending TTE--> MICHELE  -Monitor on Telemetry  - LDL as above     Respiratory:  -RA  -SPO2 >92  -Incentive Spirometry    GI:  Passed dysphagia screen     :  No gerber   Cr stable     Heme:  -s/p TPA  -SCDS/  lovenox 40mg q day    Endo:  pending A1C- 5.1  TSH- Nl      ID: Chronic sinusitis   Flonase  q day  Afebrile    SOCIAL/FAMILY:  [X] updated at bedside    CODE STATUS:  [X] Full Code    DISPOSITION:  [X] ICU     [X] Patient is not critically ill yet medically complex    Time spent: 35 critical care minutes

## 2021-02-05 NOTE — CONSULT NOTE ADULT - SUBJECTIVE AND OBJECTIVE BOX
[place] [facility]                                                               Office:  [address] [address]                                                              Telephone: [phone]. Fax:[phone]                                                                        CARDIOLOGY CONSULTATION NOTE                                                                                             Consult requested by:    Reason for Consultation:   History obtained by: Patient and medical record   obtained: No  Cardiologist:    Chief complaint:    Patient is a 60y old  Female who presents with a chief complaint of stroke (2021 10:07)        HPI:  60 year old right hand dominant female with no PMHx transferred from  with aphasia and right arm weakness that started at 920 this am. She states she was feeling in her usual state of health up until this morning when she was unable to speak to her  and became weak with her right arm. CODE Stroke was called at , NIH 2 and she received TPA at 1035. CTA/ CTP revealing thrombus distal to the L MCA Bifurication, 67ml of penumbra to left frontoparietal lobe. Pt transferred to [facility] for possible mechanical thrombectomy. Upon arrival, pt with improved symptoms, NIH 0. Pt does endorse recent sinus infection 2 weeks ago and completing abx course amoxicillin. Pt denies HA, dizziness, paresthesias, visual changes, difficulty with word finding, weakness.     NIH 0  mrS 0 (2021 15:09)        REVIEW OF SYMPTOMS:     CONSTITUTIONAL: No fever, no weight loss, no fatigue, no weight gain   ENMT: No difficulty hearing, tinnitus, vertigo; No sinus or throat pain  NECK: No pain or stiffness  CARDIOVASCULAR: No chest pain, no dyspnea, no syncope, no palpitations, no dizziness, no Orthopnea, no Paroxsymal nocturnal dyspnea  RESPIRATORY: No Dyspnea on exertion, no Shortness of breath, no cough, no wheezing  : No dysuria, no hematuria   GI: No dark color stool, no melena, no diarrhea, no constipation, no abdominal pain   NEURO: No headache, no dizziness, no slurred speech   MUSCULOSKELETAL: No joint pain or swelling; No muscle, back, or extremity pain  PSYCH: No agitation, no anxiety.    ALL OTHER REVIEW OF SYSTEMS ARE NEGATIVE.        ALLERGIES: Allergies  No Known Allergies  Intolerances        PAST MEDICAL HISTORY  No pertinent past medical history      PAST SURGICAL HISTORY  No significant past surgical history        FAMILY HISTORY:  FH: stroke, CABG father  myocardial infarction (Father)          SOCIAL HISTORY:  Denies smoking/alcohol/drugs  CIGARETTES:  Former smoker   ALCOHOL: Social  DRUGS: Denies      CURRENT MEDICATIONS:  aspirin  chewable  enoxaparin Injectable  fluticasone propionate 50 MICROgram(s)/spray Nasal Spray  influenza   Vaccine      HOME MEDICATIONS:      Vital Signs Last 24 Hrs  T(C): 36.7 (2021 08:00), Max: 36.8 (2021 12:03)  T(F): 98.1 (2021 08:00), Max: 98.3 (2021 03:15)  HR: 75 (2021 10:35) (55 - 81)  BP: 107/68 (2021 10:35) (97/63 - 137/68)  BP(mean): 86 (2021 07:35) (74 - 102)  RR: 16 (2021 10:35) (12 - 27)  SpO2: 98% (2021 10:35) (95% - 100%)      PHYSICAL EXAM:  Constitutional: Comfortable. No acute distress.   HEENT: Atraumatic and normocephalic, neck is supple . no JVD.   CNS: A&Ox3. No focal deficits. EOMI.   Respiratory: CTAB, unlabored   Cardiovascular: S1S2 RRR. No murmur/rubs or gallop.  Gastrointestinal: Soft non-tender and non distended . +Bowel sounds.   Extremities: No edema.   Psychiatric: Calm . no agitation.  Skin: No skin rash/ulcers visualized to face, hands or feet.    Intake and output:    @ 07:01  -  -05 @ 07:00  --------------------------------------------------------  IN: 2140 mL / OUT: 0 mL / NET: 2140 mL        LABS:                        12.6   6.94  )-----------( 222      ( 2021 05:10 )             36.8         142  |  110<H>  |  11.0  ----------------------------<  103<H>  3.6   |  22.0  |  0.60    Ca    8.4<L>      2021 05:10  Phos  3.2     02-05  Mg     2.0     02-05    TPro  7.6  /  Alb  4.3  /  TBili  0.5  /  DBili  x   /  AST  22  /  ALT  31  /  AlkPhos  54  02-04    CARDIAC MARKERS ( 2021 10:09 )  <0.015 ng/mL / x     / x     / x     / x          PT/INR - ( 2021 10:09 )   PT: 11.5 sec;   INR: 0.98 ratio         PTT - ( 2021 10:09 )  PTT:30.8 sec  Urinalysis Basic - ( 2021 10:13 )    Color: Yellow / Appearance: Clear / S.005 / pH: x  Gluc: x / Ketone: Negative  / Bili: Negative / Urobili: Negative mg/dL   Blood: x / Protein: Negative mg/dL / Nitrite: Negative   Leuk Esterase: Negative / RBC: 0-2 /HPF / WBC Negative   Sq Epi: x / Non Sq Epi: Occasional / Bacteria: Occasional        INTERPRETATION OF TELEMETRY: Reviewed by me.   ECG: Reviewed by me.     RADIOLOGY & ADDITIONAL STUDIES:    X-ray:  reviewed by me.   CT scan:   < from: CT Angio Neck w/ IV Cont (21 @ 10:24) >  FINDINGS:    NONCONTRAST CT HEAD:  Hyperdensity in the left MCA bifurcation image 16 of series 5 suggesting thrombus.  There is no compelling evidence for an acute transcortical infarction. There is no evidence of mass, mass effect, midline shift or extra-axial fluid collection. The lateral ventricles and cortical sulci are age-appropriate in size and configuration. The orbits, mastoid air cells and visualized paranasal sinuses are normal. The calvarium is intact. Consider MRI for further evaluation.      CTA BRAIN: Thrombus just past the left MCA bifurcation at the level of the insular ribbon on image 33 of series 3  The [place] and vertebrobasilar system are otherwise in normal without evidence of stenosis, additional occlusion or saccular aneurysm dilation. No evidence for arterial venous malformation. The right vertebral artery is dominant.    CTA NECK:  Occluded left internal carotid artery 1.5 cm from its bifurcation extending cranially to the level of the petrous segment.  A left-sided aortic arch is demonstrated. There is normal relationship to the great vessels. The common carotid arteries, right internal carotid artery and vertebral arteries are normal in caliber. No evidence of stenosis, occlusion or saccular aneurysm dilation. The right vertebral artery is dominant.    CT PERFUSION:  Patient has only had less than 2 hours of symptoms may influence the CT perfusion.    CBF<30% volume: 0 ml  Tmax>6.0 s volume: 67 ml  Mismatch volume: 67 ml  Mismatch ratio: Infinite      IMPRESSION:    No acute infarction. 67 ml penumbra in the left frontal parietal lobe. Acute thrombus just past the left MCA bifurcation at the level of the insular ribbon. Occluded left internal carotid artery 1.5 cm from its bifurcation extending cranially to the level of the petrous segment. Findings discussed with Dr. De La Cruz at 10:24 AM.        < end of copied text >    MRI:                                                                          [place] [facility]                                                               Office:  [address] [address]                                                              Telephone: [phone]. Fax:[phone]                                                                        CARDIOLOGY CONSULTATION NOTE                                                                                             Consult requested by:  Dr. fried  Reason for Consultation: CVA  History obtained by: Patient and medical record   obtained: No  Cardiologist: Dr angelita Lockhart    Chief complaint:    Patient is a 60y old  Female who presents with a chief complaint of stroke (2021 10:07)      HPI: 61 y/o F with no PMHx, 3-5K runner daily, presents to  with aphasia, R arm weekend, Code stroke called- pt administered TPA at that tiem. CTA shows L MCA stroke. Pt transfered to [facility] for possible mechanical throbectomy. Symptoms have since resolved. Pt follows with Dr. Lockhart- cardiologist . Last stress test 2016-normal. TTE- "leaky valve, ut normal". former smoker, social drinker. Tele- SR, no arrythmia.       REVIEW OF SYMPTOMS:   CONSTITUTIONAL: No fever, no weight loss, no fatigue, no weight gain   ENMT: No difficulty hearing, tinnitus, vertigo; No sinus or throat pain  NECK: No pain or stiffness  CARDIOVASCULAR: No chest pain, no dyspnea, no syncope, no palpitations, no dizziness, no Orthopnea, no Paroxsymal nocturnal dyspnea  RESPIRATORY: No Dyspnea on exertion, no Shortness of breath, no cough, no wheezing  : No dysuria, no hematuria   GI: No dark color stool, no melena, no diarrhea, no constipation, no abdominal pain   NEURO: No headache, no dizziness, no slurred speech   MUSCULOSKELETAL: No joint pain or swelling; No muscle, back, or extremity pain  PSYCH: No agitation, no anxiety.    ALL OTHER REVIEW OF SYSTEMS ARE NEGATIVE.        ALLERGIES: Allergies  No Known Allergies  Intolerances        PAST MEDICAL HISTORY  No pertinent past medical history      PAST SURGICAL HISTORY  No significant past surgical history        FAMILY HISTORY:  FH: stroke, CABG father  myocardial infarction (Father)          SOCIAL HISTORY:  Denies smoking/alcohol/drugs  CIGARETTES:  Former smoker   ALCOHOL: Social  DRUGS: Denies      CURRENT MEDICATIONS:  aspirin  chewable  enoxaparin Injectable  fluticasone propionate 50 MICROgram(s)/spray Nasal Spray  influenza   Vaccine      HOME MEDICATIONS:      Vital Signs Last 24 Hrs  T(C): 36.7 (2021 08:00), Max: 36.8 (2021 12:03)  T(F): 98.1 (2021 08:00), Max: 98.3 (2021 03:15)  HR: 75 (2021 10:35) (55 - 81)  BP: 107/68 (2021 10:35) (97/63 - 137/68)  BP(mean): 86 (2021 07:35) (74 - 102)  RR: 16 (2021 10:35) (12 - 27)  SpO2: 98% (2021 10:35) (95% - 100%)      PHYSICAL EXAM:  Constitutional: Comfortable. No acute distress.   HEENT: Atraumatic and normocephalic, neck is supple . no JVD.   CNS: A&Ox3. No focal deficits. EOMI.   Respiratory: CTAB, unlabored   Cardiovascular: S1S2 RRR. No murmur/rubs or gallop.  Gastrointestinal: Soft non-tender and non distended . +Bowel sounds.   Extremities: No edema.   Psychiatric: Calm . no agitation.  Skin: No skin rash/ulcers visualized to face, hands or feet.    Intake and output:   -04 @ 07:01  -  02-05 @ 07:00  --------------------------------------------------------  IN: 2140 mL / OUT: 0 mL / NET: 2140 mL        LABS:                        12.6   6.94  )-----------( 222      ( 2021 05:10 )             36.8     02-05    142  |  110<H>  |  11.0  ----------------------------<  103<H>  3.6   |  22.0  |  0.60    Ca    8.4<L>      2021 05:10  Phos  3.2     02-05  Mg     2.0     02-05    TPro  7.6  /  Alb  4.3  /  TBili  0.5  /  DBili  x   /  AST  22  /  ALT  31  /  AlkPhos  54  02-04    CARDIAC MARKERS ( 2021 10:09 )  <0.015 ng/mL / x     / x     / x     / x          PT/INR - ( 2021 10:09 )   PT: 11.5 sec;   INR: 0.98 ratio         PTT - ( 2021 10:09 )  PTT:30.8 sec  Urinalysis Basic - ( 2021 10:13 )    Color: Yellow / Appearance: Clear / S.005 / pH: x  Gluc: x / Ketone: Negative  / Bili: Negative / Urobili: Negative mg/dL   Blood: x / Protein: Negative mg/dL / Nitrite: Negative   Leuk Esterase: Negative / RBC: 0-2 /HPF / WBC Negative   Sq Epi: x / Non Sq Epi: Occasional / Bacteria: Occasional        INTERPRETATION OF TELEMETRY: Reviewed by me.   ECG: Reviewed by me.     RADIOLOGY & ADDITIONAL STUDIES:    X-ray:  reviewed by me.   CT scan:   < from: CT Angio Neck w/ IV Cont (21 @ 10:24) >  FINDINGS:    NONCONTRAST CT HEAD:  Hyperdensity in the left MCA bifurcation image 16 of series 5 suggesting thrombus.  There is no compelling evidence for an acute transcortical infarction. There is no evidence of mass, mass effect, midline shift or extra-axial fluid collection. The lateral ventricles and cortical sulci are age-appropriate in size and configuration. The orbits, mastoid air cells and visualized paranasal sinuses are normal. The calvarium is intact. Consider MRI for further evaluation.      CTA BRAIN: Thrombus just past the left MCA bifurcation at the level of the insular ribbon on image 33 of series 3  The [place] and vertebrobasilar system are otherwise in normal without evidence of stenosis, additional occlusion or saccular aneurysm dilation. No evidence for arterial venous malformation. The right vertebral artery is dominant.    CTA NECK:  Occluded left internal carotid artery 1.5 cm from its bifurcation extending cranially to the level of the petrous segment.  A left-sided aortic arch is demonstrated. There is normal relationship to the great vessels. The common carotid arteries, right internal carotid artery and vertebral arteries are normal in caliber. No evidence of stenosis, occlusion or saccular aneurysm dilation. The right vertebral artery is dominant.    CT PERFUSION:  Patient has only had less than 2 hours of symptoms may influence the CT perfusion.    CBF<30% volume: 0 ml  Tmax>6.0 s volume: 67 ml  Mismatch volume: 67 ml  Mismatch ratio: Infinite      IMPRESSION:    No acute infarction. 67 ml penumbra in the left frontal parietal lobe. Acute thrombus just past the left MCA bifurcation at the level of the insular ribbon. Occluded left internal carotid artery 1.5 cm from its bifurcation extending cranially to the level of the petrous segment. Findings discussed with Dr. De La Cruz at 10:24 AM.        < end of copied text >    MRI:

## 2021-02-05 NOTE — DISCHARGE NOTE NURSING/CASE MANAGEMENT/SOCIAL WORK - PATIENT PORTAL LINK FT
You can access the FollowMyHealth Patient Portal offered by [facility] by registering at the following website: [email or web address]. By joining [employer]’s FollowMyHealth portal, you will also be able to view your health information using other applications (apps) compatible with our system.

## 2021-02-05 NOTE — PROGRESS NOTE ADULT - SUBJECTIVE AND OBJECTIVE BOX
SUMMARY:HPI:  60 year old right hand dominant female with no PMHx transferred from  with aphasia and right arm weakness that started at 920 this am. She states she was feeling in her usual state of health up until this morning when she was unable to speak to her  and became weak with her right arm. CODE Stroke was called at , NIH 2 and she received TPA at 1035. CTA/ CTP revealing thrombus distal to the L MCA Bifurication, 67ml of penumbra to left frontoparietal lobe. Pt transferred to [facility] for possible mechanical thrombectomy. Upon arrival, pt with improved symptoms, NIH 0. Pt does endorse recent sinus infection 2 weeks ago and completing abx course amoxicillin. Pt denies HA, dizziness, paresthesias, visual changes, difficulty with word finding, weakness.     2/4/21- Aphasic ; R sided sensory loss- [facility]- 2    2/6/2121- Aphasic  and  decreased sensation RUE and LE-- NSCU admission  NIH =1 - aphasia    NIH 0  mrS 0 (04 Feb 2021 15:09)    CLINICAL TRIALS:  Allergies    No Known Allergies    Intolerances        REVIEW OF SYSTEMS: [X ] Unable to Assess due to neurologic exam    Neuro: [ ] Headache [ ] Back pain [ ] Numbness [ ] Weakness [ ] Ataxia [ ] Dizziness [ X] Aphasia [ ] Dysarthria [ ] Visual disturbance  Resp: [ ] Shortness of breath/dyspnea, [ ] Orthopnea [ ] Cough  CV: [ ] Chest pain [ ] Palpitation [ ] Lightheadedness [ ] Syncope  Renal: [ ] Thirst [ ] Edema  GI: [ ] Nausea [ ] Emesis [ ] Abdominal pain [ ] Constipation [ ] Diarrhea  Hem: [ ] Hematemesis [ ] bright red blood per rectum  ID: [ ] Fever [ ] Chills [ ] Dysuria  ENT: [ ] Rhinorrhea    DEVICES:   [ ] Restraints [ ] ET tube [ ] central line [ ] arterial line [ ] gerber [ ] NGT/OGT [ ] EVD [ ] LD [ ] SASHA/HMV [ ] Trach [ ] PEG [ ] Chest Tube     VITALS: [ ] Reviewed  Vital Signs Last 24 Hrs  T(C): 36.8 (05 Feb 2021 03:15), Max: 36.8 (04 Feb 2021 10:05)  T(F): 98.3 (05 Feb 2021 03:15), Max: 98.3 (04 Feb 2021 10:05)  HR: 55 (05 Feb 2021 06:35) (55 - 101)  BP: 97/73 (05 Feb 2021 06:35) (97/63 - 140/81)  BP(mean): 81 (05 Feb 2021 06:35) (74 - 102)  RR: 15 (05 Feb 2021 06:35) (12 - 27)  SpO2: 97% (05 Feb 2021 06:35) (95% - 100%)  CAPILLARY BLOOD GLUCOSE  92 (04 Feb 2021 11:08)      POCT Blood Glucose.: 101 mg/dL (04 Feb 2021 14:11)  POCT Blood Glucose.: 92 mg/dL (04 Feb 2021 10:04)      MEDICATIONS  (STANDING):  MEDICATIONS  (STANDING):  aspirin  chewable 81 milliGRAM(s) Oral daily  atorvastatin 20 milliGRAM(s) Oral at bedtime  fluticasone propionate 50 MICROgram(s)/spray Nasal Spray 1 Spray(s) Both Nostrils two times a day  heparin  Infusion 1000 Unit(s)/Hr (10 mL/Hr) IV Continuous <Continuous>  influenza   Vaccine 0.5 milliLiter(s) IntraMuscular once  sodium chloride 0.9% Bolus 1000 milliLiter(s) IV Bolus once  sodium chloride 0.9%. 1000 milliLiter(s) (75 mL/Hr) IV Continuous <Continuous>    MEDICATIONS  (PRN):  acetaminophen   Tablet .. 650 milliGRAM(s) Oral every 6 hours PRN Temp greater or equal to 38C (100.4F), Mild Pain (1 - 3)      MEDICATIONS  (PRN):  acetaminophen   Tablet .. 650 milliGRAM(s) Oral every 6 hours PRN Temp greater or equal to 38C (100.4F), Mild Pain (1 - 3)    I  05 Feb 2021 07:01  -  06 Feb 2021 07:00  --------------------------------------------------------  IN:    IV PiggyBack: 50 mL    Oral Fluid: 720 mL  Total IN: 770 mL    OUT:    Voided (mL): 300 mL  Total OUT: 300 mL    Total NET: 470 mL      06 Feb 2021 07:01  -  06 Feb 2021 13:28  --------------------------------------------------------  IN:  Heparin: 10 mL  Total IN: 10 mL    OUT:  Total OUT: 0 mL    Total NET: 10 mL        LABS:  PT/INR - ( 04 Feb 2021 10:09 )   PT: 11.5 sec;   INR: 0.98 ratio         PTT - ( 04 Feb 2021 10:09 )  PTT:30.8 sec  02-05    142  |  110<H>  |  11.0  ----------------------------<  103<H>  3.6   |  22.0  |  0.60    Ca    8.4<L>      05 Feb 2021 05:10  Phos  3.2     02-05  Mg     2.0     02-05    TPro  7.6  /  Alb  4.3  /  TBili  0.5  /  DBili  x   /  AST  22  /  ALT  31  /  AlkPhos  54  02-04                          12.6   6.94  )-----------( 222      ( 05 Feb 2021 05:10 )             36.8       STROKE CORE MEASURES:      MEDICATION LEVELS:     IMAGING/DATA: [ X] Reviewed    CT Perfusion w/ Maps w/ IV Cont (02.06.21 @ 12:27) >  INTERPRETATION:  Clinical indication: Code stroke.    Following injection of approximately 90 cc of Omnipaque 350 IV CT perfusion was performed    CBF<30%:0 ml  Tmax>6.0s: 60 mL  Mismatch volume: 60 mL  Mismatch ratio: Infinite.    IMPRESSION: CT perfusion as described above.     CT Angio Neck w/ IV Cont (02.06.21 @ 10:38) >  INTERPRETATION:  Clinical indications: Aphasia.    Multiple axial sections were performed from base of skull to vertex without contrast enhancement. Coronal and sagittal reconstructions were performed as well.    The size and configuration of the ventricles appear unchanged    Old lacunar infarct versus prominent VR space is again seen involving the left lenticular nucleus region    There is no evidence acute hemorrhage mass mass effect in the posterior fossa or supratentorial.    Evaluation of the structures with the appropriate window appears normal    The visualized paranasal sinuses mastoid and middle ear appear clear.    IMPRESSION: No significant change when allowing for differences in technique.    Findings discussed with Dr Elder on 3/6/2021 11:31 AM.  with read back.    The patient was injected with approximately 90 cc of Omnipaque 350 IV and CTA of the neck and Takotna Weiss were performed. Coronal and sagittal reconstructions were performed as well.    Evaluation of both distal common carotid, proximal internal and external carotid arteries appear normal as well as both vertebral arteries. No significant stenosis is seen.    There is normal flow involving both distal internal carotid arteries. Evaluation of the anterior cerebral, right middle cerebral basilar and posterior cerebral arteries appear normal. Prominent P-comm is seen involving the right side. There is decreased flow related enhancement involving the left M2 and M3 segments which is likely compatible areas of occlusion.    The dural venous sinuses demonstrate normal enhancement.    Evaluation of the soft tissue neck region appears normal    The visualized portion of both lung apices appear clear.    IMPRESSION:   CT angiogram of the neck appears normal.    Decreased flow related enhancement is seen involving the left M2 and M3 segmen            EXAMINATION:  PHYSICAL EXAM:    Constitutional: No Acute Distress     Neurological: Awake, alert oriented to person, place and time, Following Commands, PERRL, EOMI, No Gaze Preference, Face Symmetrical, Mild aphasia , No dysmetria, No ataxia, No nystagmus     Motor exam:          Upper extremity                         Delt     Bicep     Tricep    HG                                                 R         5/5        5/5        5/5       5/5                                               L          5/5        5/5        5/5       5/5          Lower extremity                        HF         KF        KE       DF         PF                                                  R        5/5        5/5        5/5       5/5         5/5                                               L         5/5        5/5       5/5       5/5          5/5                                                 Sensation: [X ] slight decrease RUE/LE [ ] decreased:     Pulmonary: Clear to Auscultation, No rales, No rhonchi, No wheezes     Cardiovascular: S1, S2, Regular rate and rhythm     Gastrointestinal: Soft, Non-tender, Non-distended     Extremities: No calf tenderness

## 2021-02-05 NOTE — CONSULT NOTE ADULT - ATTENDING COMMENTS
60F with acute L-MCA infarct s/p TPA, CTA neck found with occluded L-ICA 1.5 cm from the bifurcation extends cranially to the petrous segment, likely the culprit for the acute CVA, doubt cardioembolic source, EKG and tele in sinus  -await TTE with bubble study, doubt would need MICHELE/ILR given carotid as likely the source of CVA, consider vascular surgery consult and neurology follow up  -continue ASA/statin      Juan Chaudhry DO, [facility]  Faculty Non-Invasive Cardiologist  [phone] 60F with acute L-MCA infarct s/p TPA, CTA neck found with occluded L-ICA 1.5 cm from the bifurcation extends cranially to the petrous segment, likely the culprit for the acute CVA, doubt cardioembolic source would cause the complete occlusion of the ICA, EKG and tele in sinus  -await TTE with bubble study, doubt would need MICHELE/ILR given carotid as likely the source of CVA, consider vascular surgery consult and neurology follow up  -continue ASA/statin      Ji Darwin Chaudhry DO, [facility]  Faculty Non-Invasive Cardiologist  [phone]

## 2021-02-05 NOTE — PROGRESS NOTE ADULT - ASSESSMENT
60y Female who is followed by neurology because of stroke    Left MCA stroke  Symptoms resolved post alteplase  No clear risk factors  NSICU admission for post alteplase vitals and neuro-checks per protocol.  Hold antiplatelets or anticoagulant for 24 hours after alteplase administration.  Repeat head CT  24 hours after alteplase administration  MRI brain  TTE and eventual MICHELE/ILR likely  Hypercoagulable workup as outpatient since she received alteplase.  PT/OT/Speech therapy.  Keep BP under 180/105  LDL: 93  Goal LDL <70  Statin.  DVT prophylaxis.

## 2021-02-05 NOTE — CONSULT NOTE ADULT - SUBJECTIVE AND OBJECTIVE BOX
ACUTE CARE SURGERY CONSULT    HPI:  Ms. Mackey is a 60F right hand dominant, admitted to the MICU as a transfer from [facility] s/p TPA for CODE-stroke. Patient states that she has had 15-days of persistent frontal HA prior to presentation to [facility]. Yesterday morning she became dysarthric with right sided weakness. No vision loss. She reports improvement in her symptoms 1-hour after TPA administration. She currently denies HA, change in speech, facial asymmetry weakness, or aphasia. She is an active everyday runner with no known PMH. Vascular surgery consulted for evaluation after CTA demonstrating occlusion of the LICA 1.5cm cranial to the bifurcation with extension into the MCA.       PAST MEDICAL HISTORY:  No pertinent past medical history        PAST SURGICAL HISTORY:  No significant past surgical history        ALLERGIES:  No Known Allergies      FAMILY HISTORY: Noncontributory    SOCIAL HISTORY: Denies tobacco, EtOH, illicit substance use.     HOME MEDICATIONS:    MEDICATIONS  (STANDING):  aspirin  chewable 81 milliGRAM(s) Oral daily  atorvastatin 40 milliGRAM(s) Oral at bedtime  enoxaparin Injectable 40 milliGRAM(s) SubCutaneous <User Schedule>  fluticasone propionate 50 MICROgram(s)/spray Nasal Spray 1 Spray(s) Both Nostrils two times a day  influenza   Vaccine 0.5 milliLiter(s) IntraMuscular once    MEDICATIONS  (PRN):  acetaminophen   Tablet .. 650 milliGRAM(s) Oral every 6 hours PRN Temp greater or equal to 38C (100.4F), Mild Pain (1 - 3)      VITALS & I/Os:  Vital Signs Last 24 Hrs  T(C): 36.8 (2021 15:32), Max: 37 (2021 12:00)  T(F): 98.2 (2021 15:32), Max: 98.6 (2021 12:00)  HR: 61 (2021 16:00) (55 - 81)  BP: 89/55 (2021 16:00) (89/55 - 132/85)  BP(mean): 66 (2021 16:00) (66 - 102)  RR: 19 (2021 16:00) (12 - 27)  SpO2: 96% (2021 16:00) (95% - 100%)  CAPILLARY BLOOD GLUCOSE          I&O's Summary    2021 07:01  -  2021 07:00  --------------------------------------------------------  IN: 2140 mL / OUT: 0 mL / NET: 2140 mL    2021 07:01  -  2021 17:11  --------------------------------------------------------  IN: 530 mL / OUT: 0 mL / NET: 530 mL        GENERAL: Alert, well developed, in no acute distress.  MENTAL STATUS: AAOx3. Appropriate affect.  HEENT: PERRLA. EOMI. MMM. No bruits auscultated.   RESPIRATORY: CTAB. No wheezing, rales or rhonchi.  CARDIOVASCULAR: RRR. No audible murmurs, rubs or gallops.   GASTROINTESTINAL: Abdomen soft, NT, ND, -R/-G  NEUROLOGIC: Cranial nerves II-XII grossly intact. No focal neurological deficits. Moves all extremities spontaneously. Sensation intact bilaterally.  EXTREMITIES: strength 5/5 bilaterally  VASCULAR: 2+ central and peripheral pulses b/l    LABS:                        12.6   6.94  )-----------( 222      ( 2021 05:10 )             36.8     -    142  |  110<H>  |  11.0  ----------------------------<  103<H>  3.6   |  22.0  |  0.60    Ca    8.4<L>      2021 05:10  Phos  3.2     -  Mg     2.0         TPro  7.6  /  Alb  4.3  /  TBili  0.5  /  DBili  x   /  AST  22  /  ALT  31  /  AlkPhos  54  02-04    Lactate: acetaminophen   Tablet .. 650 milliGRAM(s) Oral every 6 hours PRN  aspirin  chewable 81 milliGRAM(s) Oral daily  atorvastatin 40 milliGRAM(s) Oral at bedtime  enoxaparin Injectable 40 milliGRAM(s) SubCutaneous <User Schedule>  fluticasone propionate 50 MICROgram(s)/spray Nasal Spray 1 Spray(s) Both Nostrils two times a day  influenza   Vaccine 0.5 milliLiter(s) IntraMuscular once     PT/INR - ( 2021 10:09 )   PT: 11.5 sec;   INR: 0.98 ratio         PTT - ( 2021 10:09 )  PTT:30.8 sec    CARDIAC MARKERS ( 2021 10:09 )  <0.015 ng/mL / x     / x     / x     / x            Urinalysis Basic - ( 2021 10:13 )    Color: Yellow / Appearance: Clear / S.005 / pH: x  Gluc: x / Ketone: Negative  / Bili: Negative / Urobili: Negative mg/dL   Blood: x / Protein: Negative mg/dL / Nitrite: Negative   Leuk Esterase: Negative / RBC: 0-2 /HPF / WBC Negative   Sq Epi: x / Non Sq Epi: Occasional / Bacteria: Occasional        IMAGING:  CTA NECK:  Occluded left internal carotid artery 1.5 cm from its bifurcation extending cranially to the level of the petrous segment.  A left-sided aortic arch is demonstrated. There is normal relationship to the great vessels. The common carotid arteries, right internal carotid artery and vertebral arteries are normal in caliber. No evidence of stenosis, occlusion or saccular aneurysm dilation. The right vertebral artery is dominant.   VASCULAR  SURGERY CONSULT    HPI:  Ms. Mackey is a 60F right hand dominant, admitted to the MICU as a transfer from [facility] s/p TPA for CODE-stroke. Patient states that she has had 15-days of persistent frontal HA prior to presentation to [facility]. Yesterday morning she became dysarthric with right sided weakness. No vision loss. She reports improvement in her symptoms 1-hour after TPA administration. She currently denies HA, change in speech, facial asymmetry weakness, or aphasia. She is an active everyday runner with no known PMH. Vascular surgery consulted for evaluation after CTA demonstrating occlusion of the LICA 1.5cm cranial to the bifurcation with extension into the MCA.       PAST MEDICAL HISTORY:  No pertinent past medical history        PAST SURGICAL HISTORY:  No significant past surgical history        ALLERGIES:  No Known Allergies      FAMILY HISTORY: Noncontributory    SOCIAL HISTORY: Denies tobacco, EtOH, illicit substance use.     HOME MEDICATIONS:    MEDICATIONS  (STANDING):  aspirin  chewable 81 milliGRAM(s) Oral daily  atorvastatin 40 milliGRAM(s) Oral at bedtime  enoxaparin Injectable 40 milliGRAM(s) SubCutaneous <User Schedule>  fluticasone propionate 50 MICROgram(s)/spray Nasal Spray 1 Spray(s) Both Nostrils two times a day  influenza   Vaccine 0.5 milliLiter(s) IntraMuscular once    MEDICATIONS  (PRN):  acetaminophen   Tablet .. 650 milliGRAM(s) Oral every 6 hours PRN Temp greater or equal to 38C (100.4F), Mild Pain (1 - 3)      VITALS & I/Os:  Vital Signs Last 24 Hrs  T(C): 36.8 (2021 15:32), Max: 37 (2021 12:00)  T(F): 98.2 (2021 15:32), Max: 98.6 (2021 12:00)  HR: 61 (2021 16:00) (55 - 81)  BP: 89/55 (2021 16:00) (89/55 - 132/85)  BP(mean): 66 (2021 16:00) (66 - 102)  RR: 19 (2021 16:00) (12 - 27)  SpO2: 96% (2021 16:00) (95% - 100%)  CAPILLARY BLOOD GLUCOSE          I&O's Summary    2021 07:01  -  2021 07:00  --------------------------------------------------------  IN: 2140 mL / OUT: 0 mL / NET: 2140 mL    2021 07:01  -  2021 17:11  --------------------------------------------------------  IN: 530 mL / OUT: 0 mL / NET: 530 mL        GENERAL: Alert, well developed, in no acute distress.  MENTAL STATUS: AAOx3. Appropriate affect.  HEENT: PERRLA. EOMI. MMM. No bruits auscultated.   RESPIRATORY: CTAB. No wheezing, rales or rhonchi.  CARDIOVASCULAR: RRR. No audible murmurs, rubs or gallops.   GASTROINTESTINAL: Abdomen soft, NT, ND, -R/-G  NEUROLOGIC: Cranial nerves II-XII grossly intact. No focal neurological deficits. Moves all extremities spontaneously. Sensation intact bilaterally.  EXTREMITIES: strength 5/5 bilaterally  VASCULAR: 2+ central and peripheral pulses b/l    LABS:                        12.6   6.94  )-----------( 222      ( 2021 05:10 )             36.8     -    142  |  110<H>  |  11.0  ----------------------------<  103<H>  3.6   |  22.0  |  0.60    Ca    8.4<L>      2021 05:10  Phos  3.2     -05  Mg     2.0         TPro  7.6  /  Alb  4.3  /  TBili  0.5  /  DBili  x   /  AST  22  /  ALT  31  /  AlkPhos  54  02-04    Lactate: acetaminophen   Tablet .. 650 milliGRAM(s) Oral every 6 hours PRN  aspirin  chewable 81 milliGRAM(s) Oral daily  atorvastatin 40 milliGRAM(s) Oral at bedtime  enoxaparin Injectable 40 milliGRAM(s) SubCutaneous <User Schedule>  fluticasone propionate 50 MICROgram(s)/spray Nasal Spray 1 Spray(s) Both Nostrils two times a day  influenza   Vaccine 0.5 milliLiter(s) IntraMuscular once     PT/INR - ( 2021 10:09 )   PT: 11.5 sec;   INR: 0.98 ratio         PTT - ( 2021 10:09 )  PTT:30.8 sec    CARDIAC MARKERS ( 2021 10:09 )  <0.015 ng/mL / x     / x     / x     / x            Urinalysis Basic - ( 2021 10:13 )    Color: Yellow / Appearance: Clear / S.005 / pH: x  Gluc: x / Ketone: Negative  / Bili: Negative / Urobili: Negative mg/dL   Blood: x / Protein: Negative mg/dL / Nitrite: Negative   Leuk Esterase: Negative / RBC: 0-2 /HPF / WBC Negative   Sq Epi: x / Non Sq Epi: Occasional / Bacteria: Occasional        IMAGING:  CTA NECK:  Occluded left internal carotid artery 1.5 cm from its bifurcation extending cranially to the level of the petrous segment.  A left-sided aortic arch is demonstrated. There is normal relationship to the great vessels. The common carotid arteries, right internal carotid artery and vertebral arteries are normal in caliber. No evidence of stenosis, occlusion or saccular aneurysm dilation. The right vertebral artery is dominant.

## 2021-02-05 NOTE — SBIRT NOTE ADULT - NSSBIRTALCPOSREINDET_GEN_A_CORE
Pt states she drinks one glass of wine each night with dinner. Pt denies any issues w/ her ETOH use.

## 2021-02-05 NOTE — PROGRESS NOTE ADULT - ASSESSMENT
ASSESSMENT/PLAN:  60 year old female with no PMH presents to [facility] with stroke like symptoms s/p TPA at 1035. CTA/CTP with thrombus distal to L MCA bifurication. Symptoms resolved s/p TPA.    2nd event NIH 1. mRS 0. - 2/6/21    PLAN:  Neuro:  -Admit to neuro ICU-  q 1 hr neuro checks   - Dago-Synephrine drip-  Trial fo HTN augmentation - access neuro exam at each 20mmHg increment of  increase BP  for clinical improvement  - Bolus - 500c over 1 hr  - Re-eval by PT/OT- communication board  - ASA q day  -s/p TPA 2/4 1  - MRI with fat sats- report suggestive of dissection - recurrent event on ASA with likely thrombus associated with dissection - start heparin 1000u/hr - goal 50-70  - Suggest angio to define extent of occlusion/ dissection  - dysphagia screen- P  -PT/OT/ Speech      CV:   --180  -pending TTE  -Monitor on Telemetry  - LDL - goal 70 - atorvastatin 20mg  qday     Respiratory:  -RA  -SPO2 >92  -Incentive Spirometry    GI:   dysphagia screen     :  No gerber   Cr stable     Heme:  -s/p TPA  -SCDS/  lovenox 40mg q day    Endo:  pending A1C- 5.1  TSH- Nl      ID: Chronic sinusitis   Flonase  q day  Afebrile    SOCIAL/FAMILY:  [X] updated at bedside    CODE STATUS:  [X] Full Code    DISPOSITION:  [X] ICU     [X] Patient is  critically ill and monitor for recurrent stroke     Time spent: 40 critical care minutes

## 2021-02-05 NOTE — PHARMACOTHERAPY INTERVENTION NOTE - COMMENTS
Spoke with patient at bedside for medication list.  Patient reports taking Augmentin for her recent sinus infection (took 9 days out of 10 day course) and is also using flonase recently for her sinus infection.

## 2021-02-05 NOTE — PROGRESS NOTE ADULT - SUBJECTIVE AND OBJECTIVE BOX
[facility]                                        Neurology at [place]                                 Tomasa Recinos, & Noé                                  [address]                                        [place], [place], [address]                                             [phone]        CC: Stroke    HPI:   The patient is a 60y Female who presented as transfer from [facility] for stroke status post alteplase.  At 0920 she became acutely aphasic with right face and arm weakness/funny feeling.  She had NIH SS score of 2. She had head CT that did not show blood or large stroke and was given alteplase at 1035, CT-A head showed distal left MCA thrombus and CTP showed 67 cc penumbra. She was ultimately transferred to [facility] for further care.    Interim history:  Now in ICU.   Speech normal.    ROS:   Denies headache or dizziness.  Denies chest pain.  Denies shortness of breath.    MEDICATIONS  (STANDING):  atorvastatin 80 milliGRAM(s) Oral at bedtime  influenza   Vaccine 0.5 milliLiter(s) IntraMuscular once      Vital Signs Last 24 Hrs  T(C): 36.7 (05 Feb 2021 08:00), Max: 36.8 (04 Feb 2021 10:32)  T(F): 98.1 (05 Feb 2021 08:00), Max: 98.3 (05 Feb 2021 03:15)  HR: 81 (05 Feb 2021 08:35) (55 - 99)  BP: 110/75 (05 Feb 2021 08:35) (97/63 - 140/81)  BP(mean): 86 (05 Feb 2021 07:35) (74 - 102)  RR: 23 (05 Feb 2021 08:35) (12 - 27)  SpO2: 98% (05 Feb 2021 08:35) (95% - 100%)    Detailed Neurologic Exam:    Mental status: The patient is awake and alert. There is no aphasia. There is no dysarthria.     Cranial nerves: Pupils equal and react symmetrically to light. There is no visual field deficit to threat. Extraocular motion is full with no nystagmus. There is no ptosis. Facial sensation is intact. Facial musculature is symmetric. Palate elevates symmetrically. Tongue is midline.    Motor: There is normal bulk and tone.  There is no tremor.  Strength grossly 5/5 bilaterally.    Sensation: Grossly intact to light touch and pin.    Reflexes: 2+ throughout and plantar responses are flexor.    Cerebellar: No dysmetria on finger nose testing.    Labs:     02-05    142  |  110<H>  |  11.0  ----------------------------<  103<H>  3.6   |  22.0  |  0.60    Ca    8.4<L>      05 Feb 2021 05:10  Phos  3.2     02-05  Mg     2.0     02-05    TPro  7.6  /  Alb  4.3  /  TBili  0.5  /  DBili  x   /  AST  22  /  ALT  31  /  AlkPhos  54  02-04                            12.6   6.94  )-----------( 222      ( 05 Feb 2021 05:10 )             36.8

## 2021-02-05 NOTE — CONSULT NOTE ADULT - ATTENDING COMMENTS
Patient evaluated at the bedside at 10 AM: new onset of Aphasia that improved over the course of 3 minutes with some residual deficit (not able to find few words but able to finish most of sentences). No motor sensory deficit. Neuro and medical team called at the bedside. Patient has L ICA occlusion from the neck up to intracranial segments.  She was transferred from  after L hemispheric symptoms resolved one hour after TPA was given. Penumbra mechanical thrombectomy was not successful yesterday. At this point, given extensive L ICA occlusion, no surgical intervention is indicated. Patient is currently on ASA and Statins. After STAT head CT is performed, and no signs of intracranial bleeding are ruled out, I would consider Plavix therapy. I recommend anticoagulation - anti aggregation  discussion with [facility]-interventional service regarding aggressive anticoagulation given risk of hemorrhagic conversion.

## 2021-02-05 NOTE — PROGRESS NOTE ADULT - SUBJECTIVE AND OBJECTIVE BOX
[facility] Division of Hospital Medicine    Chief Complaint: stroke    Hospital Course - 59 yo F with no PMH was transferred to [facility] from [facility] for acute aphasia with R face, arm weakness. She had a NIHSS of 2. CT that did not show blood or large stroke and was given alteplase at 1035, CTA head showed distal left MCA thrombus. She was given alteplase and transferred to [facility]. She as sent to the ICU for post tPA monitoring. Patient's symptoms have resolved. Repeat CTH 24 hours post tPA is stable.     SUBJECTIVE / OVERNIGHT EVENTS:    Patient denies chest pain, SOB, abd pain, N/V, fever, chills, dysuria or any other complaints. All remainder ROS negative.     MEDICATIONS  (STANDING):  aspirin  chewable 81 milliGRAM(s) Oral daily  enoxaparin Injectable 40 milliGRAM(s) SubCutaneous <User Schedule>  fluticasone propionate 50 MICROgram(s)/spray Nasal Spray 1 Spray(s) Both Nostrils two times a day  influenza   Vaccine 0.5 milliLiter(s) IntraMuscular once    MEDICATIONS  (PRN):  acetaminophen   Tablet .. 650 milliGRAM(s) Oral every 6 hours PRN Temp greater or equal to 38C (100.4F), Mild Pain (1 - 3)        I&O's Summary    2021 07:01  -  2021 07:00  --------------------------------------------------------  IN: 2140 mL / OUT: 0 mL / NET: 2140 mL        PHYSICAL EXAM:  Vital Signs Last 24 Hrs  T(C): 36.7 (2021 08:00), Max: 36.8 (2021 03:15)  T(F): 98.1 (2021 08:00), Max: 98.3 (2021 03:15)  HR: 72 (2021 12:00) (55 - 81)  BP: 117/79 (2021 11:00) (97/63 - 137/68)  BP(mean): 92 (2021 11:00) (74 - 102)  RR: 18 (2021 12:00) (12 - 27)  SpO2: 98% (2021 12:00) (95% - 100%)        CONSTITUTIONAL: NAD, well-developed, well-groomed  ENMT: Moist oral mucosa, no pharyngeal injection or exudates; normal dentition  RESPIRATORY: Normal respiratory effort; lungs are clear to auscultation bilaterally  CARDIOVASCULAR: Regular rate and rhythm, normal S1 and S2, no murmur/rub/gallop; No lower extremity edema; Peripheral pulses are 2+ bilaterally  ABDOMEN: Nontender to palpation, normoactive bowel sounds, no rebound/guarding; No hepatosplenomegaly  MUSCULOSKELETAL: no clubbing or cyanosis of digits; no joint swelling or tenderness to palpation  PSYCH: A+O to person, place, and time; affect appropriate  NEUROLOGY: CN 2-12 are intact and symmetric; no gross sensory deficits;   SKIN: No rashes; no palpable lesions    LABS:                        12.6   6.94  )-----------( 222      ( 2021 05:10 )             36.8     02-05    142  |  110<H>  |  11.0  ----------------------------<  103<H>  3.6   |  22.0  |  0.60    Ca    8.4<L>      2021 05:10  Phos  3.2     02-05  Mg     2.0     02-05    TPro  7.6  /  Alb  4.3  /  TBili  0.5  /  DBili  x   /  AST  22  /  ALT  31  /  AlkPhos  54  02-04    PT/INR - ( 2021 10:09 )   PT: 11.5 sec;   INR: 0.98 ratio         PTT - ( 2021 10:09 )  PTT:30.8 sec  CARDIAC MARKERS ( 2021 10:09 )  <0.015 ng/mL / x     / x     / x     / x          Urinalysis Basic - ( 2021 10:13 )    Color: Yellow / Appearance: Clear / S.005 / pH: x  Gluc: x / Ketone: Negative  / Bili: Negative / Urobili: Negative mg/dL   Blood: x / Protein: Negative mg/dL / Nitrite: Negative   Leuk Esterase: Negative / RBC: 0-2 /HPF / WBC Negative   Sq Epi: x / Non Sq Epi: Occasional / Bacteria: Occasional        Culture - Urine (collected 2021 10:13)  Source: .Urine Clean Catch (Midstream)  Final Report (2021 11:38):    <10,000 CFU/mL Normal Urogenital Anabella      CAPILLARY BLOOD GLUCOSE      POCT Blood Glucose.: 101 mg/dL (2021 14:11)        RADIOLOGY & ADDITIONAL TESTS:  Results Reviewed:   Imaging Personally Reviewed:  Electrocardiogram Personally Reviewed:    NONCONTRAST CT HEAD:  Hyperdensity in the left MCA bifurcation image 16 of series 5 suggesting thrombus.  There is no compelling evidence for an acute transcortical infarction. There is no evidence of mass, mass effect, midline shift or extra-axial fluid collection. The lateral ventricles and cortical sulci are age-appropriate in size and configuration. The orbits, mastoid air cells and visualized paranasal sinuses are normal. The calvarium is intact. Consider MRI for further evaluation.      CTA BRAIN: Thrombus just past the left MCA bifurcation at the level of the insular ribbon on image 33 of series 3  The [place] and vertebrobasilar system are otherwise in normal without evidence of stenosis, additional occlusion or saccular aneurysm dilation. No evidence for arterial venous malformation. The right vertebral artery is dominant.    CTA NECK:  Occluded left internal carotid artery 1.5 cm from its bifurcation extending cranially to the level of the petrous segment.  A left-sided aortic arch is demonstrated. There is normal relationship to the great vessels. The common carotid arteries, right internal carotid artery and vertebral arteries are normal in caliber. No evidence of stenosis, occlusion or saccular aneurysm dilation. The right vertebral artery is dominant.    CT PERFUSION:  No acute infarction. 67 ml penumbra in the left frontal parietal lobe. Acute thrombus just past the left MCA bifurcation at the level of the insular ribbon. Occluded left internal carotid artery 1.5 cm from its bifurcation extending cranially to the level of the petrous segment. Findings discussed with Dr. De La Cruz at 10:24 AM.

## 2021-02-05 NOTE — CONSULT NOTE ADULT - ASSESSMENT
60F right hand dominant, admitted to the MICU as a transfer from [facility] s/p TPA for CODE-stroke. Patient without residual neurological defects. 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 No evidence of right sided disease.     -No acute vascular surgery intervention indicated at this time.   -Recommend maintain full dose ASA/Statin therapy  60F right hand dominant, admitted to the MICU as a transfer from [facility] s/p TPA for CODE-stroke. Patient without residual neurological defects. 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 No evidence of right sided disease.       -No acute vascular surgery intervention indicated at this time.   -Recommend maintain full dose ASA/Statin therapy     Addendum  Patient seen and examined with Dr. Rodriguez.  Patient developed new episode of Aphasia this morning 2/6 during rounds, code stroke was activated and primary team was made aware. Patient spontaneously improved. Repeat CT was ordered by primary team.  Records and imaging reviewed. Given 100% occlusion of the LICA, there is no indication for vascular intervention.    -Continue with medical management by primary team  -Call if any new vascular concern

## 2021-02-05 NOTE — PROGRESS NOTE ADULT - SUBJECTIVE AND OBJECTIVE BOX
SUMMARY:HPI:  60 year old right hand dominant female with no PMHx transferred from  with aphasia and right arm weakness that started at 920 this am. She states she was feeling in her usual state of health up until this morning when she was unable to speak to her  and became weak with her right arm. CODE Stroke was called at , NIH 2 and she received TPA at 1035. CTA/ CTP revealing thrombus distal to the L MCA Bifurication, 67ml of penumbra to left frontoparietal lobe. Pt transferred to [facility] for possible mechanical thrombectomy. Upon arrival, pt with improved symptoms, NIH 0. Pt does endorse recent sinus infection 2 weeks ago and completing abx course amoxicillin. Pt denies HA, dizziness, paresthesias, visual changes, difficulty with word finding, weakness.     NIH 0  mrS 0 (04 Feb 2021 15:09)    OVERNIGHT EVENTS:     ADMISSION SCORES:   GCS: : MF: NIHSS: RASS: CAM-ICU: ICP:  CLINICAL TRIALS:  Allergies    No Known Allergies    Intolerances        REVIEW OF SYSTEMS: [ ] Unable to Assess due to neurologic exam   [ ] All ROS addressed below are non-contributory, except:  Neuro: [ ] Headache [ ] Back pain [ ] Numbness [ ] Weakness [ ] Ataxia [ ] Dizziness [ ] Aphasia [ ] Dysarthria [ ] Visual disturbance  Resp: [ ] Shortness of breath/dyspnea, [ ] Orthopnea [ ] Cough  CV: [ ] Chest pain [ ] Palpitation [ ] Lightheadedness [ ] Syncope  Renal: [ ] Thirst [ ] Edema  GI: [ ] Nausea [ ] Emesis [ ] Abdominal pain [ ] Constipation [ ] Diarrhea  Hem: [ ] Hematemesis [ ] bright red blood per rectum  ID: [ ] Fever [ ] Chills [ ] Dysuria  ENT: [ ] Rhinorrhea    DEVICES:   [ ] Restraints [ ] ET tube [ ] central line [ ] arterial line [ ] gerber [ ] NGT/OGT [ ] EVD [ ] LD [ ] SASHA/HMV [ ] Trach [ ] PEG [ ] Chest Tube     VITALS: [ ] Reviewed  Vital Signs Last 24 Hrs  T(C): 36.8 (05 Feb 2021 03:15), Max: 36.8 (04 Feb 2021 10:05)  T(F): 98.3 (05 Feb 2021 03:15), Max: 98.3 (04 Feb 2021 10:05)  HR: 55 (05 Feb 2021 06:35) (55 - 101)  BP: 97/73 (05 Feb 2021 06:35) (97/63 - 140/81)  BP(mean): 81 (05 Feb 2021 06:35) (74 - 102)  RR: 15 (05 Feb 2021 06:35) (12 - 27)  SpO2: 97% (05 Feb 2021 06:35) (95% - 100%)  CAPILLARY BLOOD GLUCOSE  92 (04 Feb 2021 11:08)      POCT Blood Glucose.: 101 mg/dL (04 Feb 2021 14:11)  POCT Blood Glucose.: 92 mg/dL (04 Feb 2021 10:04)        LABS:  PT/INR - ( 04 Feb 2021 10:09 )   PT: 11.5 sec;   INR: 0.98 ratio         PTT - ( 04 Feb 2021 10:09 )  PTT:30.8 sec  02-05    142  |  110<H>  |  11.0  ----------------------------<  103<H>  3.6   |  22.0  |  0.60    Ca    8.4<L>      05 Feb 2021 05:10  Phos  3.2     02-05  Mg     2.0     02-05    TPro  7.6  /  Alb  4.3  /  TBili  0.5  /  DBili  x   /  AST  22  /  ALT  31  /  AlkPhos  54  02-04                          12.6   6.94  )-----------( 222      ( 05 Feb 2021 05:10 )             36.8       STROKE CORE MEASURES:      MEDICATION LEVELS:     IMAGING/DATA: [ ] Reviewed    IVF FLUIDS/MEDICATIONS: [ ] Reviewed  MEDICATIONS  (STANDING):  atorvastatin 80 milliGRAM(s) Oral at bedtime  influenza   Vaccine 0.5 milliLiter(s) IntraMuscular once    MEDICATIONS  (PRN):  acetaminophen   Tablet .. 650 milliGRAM(s) Oral every 6 hours PRN Temp greater or equal to 38C (100.4F), Mild Pain (1 - 3)    I&O's Summary    04 Feb 2021 07:01  -  05 Feb 2021 07:00  --------------------------------------------------------  IN: 2140 mL / OUT: 0 mL / NET: 2140 mL        EXAMINATION:  PHYSICAL EXAM:    Constitutional: No Acute Distress     Neurological: Awake, alert oriented to person, place and time, Following Commands, PERRL, EOMI, No Gaze Preference, Face Symmetrical, Speech Fluent, No dysmetria, No ataxia, No nystagmus     Motor exam:          Upper extremity                         Delt     Bicep     Tricep    HG                                                 R         5/5 5/5 5/5 5/5                                               L          5/5 5/5 5/5 5/5          Lower extremity                        HF         KF        KE       DF         PF                                                  R        5/5 5/5 5/5 5/5 5/5                                               L         5/5 5/5 5/5 5/5 5/5                                                 Sensation: [ ] intact to light touch  [ ] decreased:     Reflexes: Deep Tendon Reflexes Intact     Pulmonary: Clear to Auscultation, No rales, No rhonchi, No wheezes     Cardiovascular: S1, S2, Regular rate and rhythm     Gastrointestinal: Soft, Non-tender, Non-distended     Extremities: No calf tenderness     Incision:    SUMMARY:HPI:  60 year old right hand dominant female with no PMHx transferred from  with aphasia and right arm weakness that started at 920 this am. She states she was feeling in her usual state of health up until this morning when she was unable to speak to her  and became weak with her right arm. CODE Stroke was called at , NIH 2 and she received TPA at 1035. CTA/ CTP revealing thrombus distal to the L MCA Bifurication, 67ml of penumbra to left frontoparietal lobe. Pt transferred to [facility] for possible mechanical thrombectomy. Upon arrival, pt with improved symptoms, NIH 0. Pt does endorse recent sinus infection 2 weeks ago and completing abx course amoxicillin. Pt denies HA, dizziness, paresthesias, visual changes, difficulty with word finding, weakness.     NIH 0  mrS 0 (04 Feb 2021 15:09)    OVERNIGHT EVENTS:     ADMISSION SCORES:   GCS: : MF: NIHSS: RASS: CAM-ICU: ICP:  CLINICAL TRIALS:  Allergies    No Known Allergies    Intolerances        REVIEW OF SYSTEMS: [ ] Unable to Assess due to neurologic exam   [ ] All ROS addressed below are non-contributory, except:  Neuro: [ ] Headache [ ] Back pain [ ] Numbness [ ] Weakness [ ] Ataxia [ ] Dizziness [ ] Aphasia [ ] Dysarthria [ ] Visual disturbance  Resp: [ ] Shortness of breath/dyspnea, [ ] Orthopnea [ ] Cough  CV: [ ] Chest pain [ ] Palpitation [ ] Lightheadedness [ ] Syncope  Renal: [ ] Thirst [ ] Edema  GI: [ ] Nausea [ ] Emesis [ ] Abdominal pain [ ] Constipation [ ] Diarrhea  Hem: [ ] Hematemesis [ ] bright red blood per rectum  ID: [ ] Fever [ ] Chills [ ] Dysuria  ENT: [ ] Rhinorrhea    DEVICES:   [ ] Restraints [ ] ET tube [ ] central line [ ] arterial line [ ] gerber [ ] NGT/OGT [ ] EVD [ ] LD [ ] SASHA/HMV [ ] Trach [ ] PEG [ ] Chest Tube     VITALS: [ ] Reviewed  Vital Signs Last 24 Hrs  T(C): 36.8 (05 Feb 2021 03:15), Max: 36.8 (04 Feb 2021 10:05)  T(F): 98.3 (05 Feb 2021 03:15), Max: 98.3 (04 Feb 2021 10:05)  HR: 55 (05 Feb 2021 06:35) (55 - 101)  BP: 97/73 (05 Feb 2021 06:35) (97/63 - 140/81)  BP(mean): 81 (05 Feb 2021 06:35) (74 - 102)  RR: 15 (05 Feb 2021 06:35) (12 - 27)  SpO2: 97% (05 Feb 2021 06:35) (95% - 100%)  CAPILLARY BLOOD GLUCOSE  92 (04 Feb 2021 11:08)      POCT Blood Glucose.: 101 mg/dL (04 Feb 2021 14:11)  POCT Blood Glucose.: 92 mg/dL (04 Feb 2021 10:04)        LABS:  PT/INR - ( 04 Feb 2021 10:09 )   PT: 11.5 sec;   INR: 0.98 ratio         PTT - ( 04 Feb 2021 10:09 )  PTT:30.8 sec  02-05    142  |  110<H>  |  11.0  ----------------------------<  103<H>  3.6   |  22.0  |  0.60    Ca    8.4<L>      05 Feb 2021 05:10  Phos  3.2     02-05  Mg     2.0     02-05    TPro  7.6  /  Alb  4.3  /  TBili  0.5  /  DBili  x   /  AST  22  /  ALT  31  /  AlkPhos  54  02-04                          12.6   6.94  )-----------( 222      ( 05 Feb 2021 05:10 )             36.8       STROKE CORE MEASURES:      MEDICATION LEVELS:     IMAGING/DATA: [ ] Reviewed    IVF FLUIDS/MEDICATIONS: [ ] Reviewed  MEDICATIONS  (STANDING):  atorvastatin 80 milliGRAM(s) Oral at bedtime  influenza   Vaccine 0.5 milliLiter(s) IntraMuscular once    MEDICATIONS  (PRN):  acetaminophen   Tablet .. 650 milliGRAM(s) Oral every 6 hours PRN Temp greater or equal to 38C (100.4F), Mild Pain (1 - 3)    I&O's Summary    04 Feb 2021 07:01  -  05 Feb 2021 07:00  --------------------------------------------------------  IN: 2140 mL / OUT: 0 mL / NET: 2140 mL        EXAMINATION:  PHYSICAL EXAM:    Constitutional: No Acute Distress     Neurological: Awake, alert oriented to person, place and time, Following Commands, PERRL, EOMI, No Gaze Preference, Face Symmetrical, Speech Fluent, No dysmetria, No ataxia, No nystagmus     Motor exam:          Upper extremity                         Delt     Bicep     Tricep    HG                                                 R         5/5 5/5 5/5 5/5                                               L          5/5 5/5 5/5 5/5          Lower extremity                        HF         KF        KE       DF         PF                                                  R        5/5 5/5 5/5 5/5 5/5                                               L         5/5 5/5 5/5 5/5 5/5                                                 Sensation: [X ] intact to light touch  [ ] decreased:     Pulmonary: Clear to Auscultation, No rales, No rhonchi, No wheezes     Cardiovascular: S1, S2, Regular rate and rhythm     Gastrointestinal: Soft, Non-tender, Non-distended     Extremities: No calf tenderness

## 2021-02-06 LAB
ALBUMIN SERPL ELPH-MCNC: 3.6 G/DL — SIGNIFICANT CHANGE UP (ref 3.3–5.2)
ALP SERPL-CCNC: 42 U/L — SIGNIFICANT CHANGE UP (ref 40–120)
ALT FLD-CCNC: 13 U/L — SIGNIFICANT CHANGE UP
ANION GAP SERPL CALC-SCNC: 9 MMOL/L — SIGNIFICANT CHANGE UP (ref 5–17)
APTT BLD: 69.7 SEC — HIGH (ref 27.5–35.5)
AST SERPL-CCNC: 14 U/L — SIGNIFICANT CHANGE UP
BILIRUB SERPL-MCNC: 0.4 MG/DL — SIGNIFICANT CHANGE UP (ref 0.4–2)
BUN SERPL-MCNC: 13 MG/DL — SIGNIFICANT CHANGE UP (ref 8–20)
CALCIUM SERPL-MCNC: 9 MG/DL — SIGNIFICANT CHANGE UP (ref 8.6–10.2)
CHLORIDE SERPL-SCNC: 109 MMOL/L — HIGH (ref 98–107)
CO2 SERPL-SCNC: 24 MMOL/L — SIGNIFICANT CHANGE UP (ref 22–29)
CREAT SERPL-MCNC: 0.68 MG/DL — SIGNIFICANT CHANGE UP (ref 0.5–1.3)
GLUCOSE SERPL-MCNC: 87 MG/DL — SIGNIFICANT CHANGE UP (ref 70–99)
HCT VFR BLD CALC: 35.5 % — SIGNIFICANT CHANGE UP (ref 34.5–45)
HGB BLD-MCNC: 11.9 G/DL — SIGNIFICANT CHANGE UP (ref 11.5–15.5)
MAGNESIUM SERPL-MCNC: 2 MG/DL — SIGNIFICANT CHANGE UP (ref 1.6–2.6)
MCHC RBC-ENTMCNC: 31.4 PG — SIGNIFICANT CHANGE UP (ref 27–34)
MCHC RBC-ENTMCNC: 33.5 GM/DL — SIGNIFICANT CHANGE UP (ref 32–36)
MCV RBC AUTO: 93.7 FL — SIGNIFICANT CHANGE UP (ref 80–100)
PHOSPHATE SERPL-MCNC: 3.8 MG/DL — SIGNIFICANT CHANGE UP (ref 2.4–4.7)
PLATELET # BLD AUTO: 201 K/UL — SIGNIFICANT CHANGE UP (ref 150–400)
POTASSIUM SERPL-MCNC: 4.7 MMOL/L — SIGNIFICANT CHANGE UP (ref 3.5–5.3)
POTASSIUM SERPL-SCNC: 4.7 MMOL/L — SIGNIFICANT CHANGE UP (ref 3.5–5.3)
PROT SERPL-MCNC: 5.7 G/DL — LOW (ref 6.6–8.7)
RBC # BLD: 3.79 M/UL — LOW (ref 3.8–5.2)
RBC # FLD: 11.7 % — SIGNIFICANT CHANGE UP (ref 10.3–14.5)
SODIUM SERPL-SCNC: 142 MMOL/L — SIGNIFICANT CHANGE UP (ref 135–145)
WBC # BLD: 5.76 K/UL — SIGNIFICANT CHANGE UP (ref 3.8–10.5)
WBC # FLD AUTO: 5.76 K/UL — SIGNIFICANT CHANGE UP (ref 3.8–10.5)

## 2021-02-06 PROCEDURE — 70498 CT ANGIOGRAPHY NECK: CPT | Mod: 26

## 2021-02-06 PROCEDURE — 99233 SBSQ HOSP IP/OBS HIGH 50: CPT

## 2021-02-06 PROCEDURE — 0042T: CPT

## 2021-02-06 PROCEDURE — 70496 CT ANGIOGRAPHY HEAD: CPT | Mod: 26

## 2021-02-06 PROCEDURE — 99291 CRITICAL CARE FIRST HOUR: CPT

## 2021-02-06 PROCEDURE — 99232 SBSQ HOSP IP/OBS MODERATE 35: CPT

## 2021-02-06 RX ORDER — HEPARIN SODIUM 5000 [USP'U]/ML
INJECTION INTRAVENOUS; SUBCUTANEOUS
Qty: 25000 | Refills: 0 | Status: DISCONTINUED | OUTPATIENT
Start: 2021-02-06 | End: 2021-02-06

## 2021-02-06 RX ORDER — SODIUM CHLORIDE 9 MG/ML
1000 INJECTION INTRAMUSCULAR; INTRAVENOUS; SUBCUTANEOUS
Refills: 0 | Status: DISCONTINUED | OUTPATIENT
Start: 2021-02-06 | End: 2021-02-09

## 2021-02-06 RX ORDER — SODIUM CHLORIDE 9 MG/ML
250 INJECTION INTRAMUSCULAR; INTRAVENOUS; SUBCUTANEOUS ONCE
Refills: 0 | Status: DISCONTINUED | OUTPATIENT
Start: 2021-02-06 | End: 2021-02-07

## 2021-02-06 RX ORDER — SODIUM CHLORIDE 9 MG/ML
1000 INJECTION INTRAMUSCULAR; INTRAVENOUS; SUBCUTANEOUS ONCE
Refills: 0 | Status: COMPLETED | OUTPATIENT
Start: 2021-02-06 | End: 2021-02-06

## 2021-02-06 RX ORDER — HEPARIN SODIUM 5000 [USP'U]/ML
1000 INJECTION INTRAVENOUS; SUBCUTANEOUS
Qty: 25000 | Refills: 0 | Status: DISCONTINUED | OUTPATIENT
Start: 2021-02-06 | End: 2021-02-07

## 2021-02-06 RX ORDER — SODIUM CHLORIDE 9 MG/ML
500 INJECTION INTRAMUSCULAR; INTRAVENOUS; SUBCUTANEOUS ONCE
Refills: 0 | Status: DISCONTINUED | OUTPATIENT
Start: 2021-02-06 | End: 2021-02-07

## 2021-02-06 RX ORDER — PHENYLEPHRINE HYDROCHLORIDE 10 MG/ML
0.2 INJECTION INTRAVENOUS
Qty: 40 | Refills: 0 | Status: DISCONTINUED | OUTPATIENT
Start: 2021-02-06 | End: 2021-02-08

## 2021-02-06 RX ADMIN — SODIUM CHLORIDE 75 MILLILITER(S): 9 INJECTION INTRAMUSCULAR; INTRAVENOUS; SUBCUTANEOUS at 15:40

## 2021-02-06 RX ADMIN — Medication 81 MILLIGRAM(S): at 15:59

## 2021-02-06 RX ADMIN — PHENYLEPHRINE HYDROCHLORIDE 4.76 MICROGRAM(S)/KG/MIN: 10 INJECTION INTRAVENOUS at 15:59

## 2021-02-06 RX ADMIN — SODIUM CHLORIDE 1000 MILLILITER(S): 9 INJECTION INTRAMUSCULAR; INTRAVENOUS; SUBCUTANEOUS at 10:00

## 2021-02-06 RX ADMIN — ATORVASTATIN CALCIUM 40 MILLIGRAM(S): 80 TABLET, FILM COATED ORAL at 21:19

## 2021-02-06 RX ADMIN — HEPARIN SODIUM 10 UNIT(S)/HR: 5000 INJECTION INTRAVENOUS; SUBCUTANEOUS at 11:14

## 2021-02-06 RX ADMIN — HEPARIN SODIUM 10 UNIT(S)/HR: 5000 INJECTION INTRAVENOUS; SUBCUTANEOUS at 21:21

## 2021-02-06 RX ADMIN — Medication 650 MILLIGRAM(S): at 08:55

## 2021-02-06 NOTE — PROGRESS NOTE ADULT - SUBJECTIVE AND OBJECTIVE BOX
[place] [facility]                          [address] [place] [address]                       Phone: [phone]. Fax:[phone]                      ________________________________________________    HPI:  60 year old right hand dominant female with no PMHx transferred from  with aphasia and right arm weakness that started at 920 this am. She states she was feeling in her usual state of health up until this morning when she was unable to speak to her  and became weak with her right arm. CODE Stroke was called at , NIH 2 and she received TPA at 1035. CTA/ CTP revealing thrombus distal to the L MCA Bifurication, 67ml of penumbra to left frontoparietal lobe. Pt transferred to [facility] for possible mechanical thrombectomy. Upon arrival, pt with improved symptoms, NIH 0. Pt does endorse recent sinus infection 2 weeks ago and completing abx course amoxicillin. Pt denies HA, dizziness, paresthesias, visual changes, difficulty with word finding, weakness.     NIH 0  mrS 0 (2021 15:09)    ROS: All review of systems negative unless indicated otherwise below.                          LAB RESULTS                 COMPLETE BLOOD COUNT( 2021 06:08 )                            11.9 g/dL  5.76 K/uL )---------------( 201 K/uL                        35.5 %      Automated Differential     Auto Basophil # - X      Auto Basophil % - X      Auto Eosinophil # - X      Auto Eosinophil % - X      Auto Immature Granulocyte # - X      Auto Immature Granulocyte % - X      Auto Lymphocyte # - X      Auto Lymphocyte % - X      Auto Monocyte # - X      Auto Monocyte % - X      Auto Neutrophil # - X      Auto Neutrophil % - X                                      CHEMISTRY                 Basic Metabolic Panel (21 @ 06:08)    142  |  109<H>  |  13.0  ----------------------------<  87  4.7   |  24.0  |  0.68    Ca    9.0      2021 06:08  Phos  3.8     02-06  Mg     2.0     02-06                    Liver Functions (21 @ 06:08))  TPro  5.7  /  Alb  3.6  /  TBili  0.4  /  DBili  x   /  AST  14  /  ALT  13  /  AlkPhos  42     PT/INR/PTT ( 2021 10:09 )                        :                       :      11.5         :       30.8                  .        .                   .              .           .       0.98        .                                                             MICROBIOLOGY/CULTURES:  Culture - Urine (collected 21 @ 10:13)  Source: .Urine Clean Catch (Midstream)  Final Report (21 @ 11:38):    <10,000 CFU/mL Normal Urogenital Anabella                          RADIOLOGY RESULTS: Personally visualized     < from: Xray Chest 1 View AP/PA. (21 @ 10:50) >  IMPRESSION:   No evidence of active chest disease.    < end of copied text >    < from: CT Angio Neck w/ IV Cont (21 @ 10:38) >  IMPRESSION: CT angiogram of the neck appears normal.    Decreased flow related enhancement is seen involving the left M2 and M3 segments.    < end of copied text >                          CARDIOLOGY RESULTS: Official Report/Preliminary Verbal Reports    ECHO:   < from: TTE Echo Limited or F/U (21 @ 17:21) >  Summary:   1. Technically good study.   2. Normal global left ventricular systolic function.   3. Left ventricular ejection fraction, by visual estimation, is 60 to 65%.   4. Normal left atrial size.   5. Normalright atrial size.   6. Mild mitral annular calcification.   7. Mild mitral valve regurgitation.   8. There is no evidence of pericardial effusion.    < end of copied text >                             CARDIOLOGY REVIEW: Personally visualized and reviewed  Telemetry Last 24h:                              DAILY WEIGHTS - 48 HOUR TREND     Daily Weight in k.1 (2021 06:04), Weight in k.5 (2021 20:35)                             INTAKE AND OUTPUT - 48 HOUR TREND     21 @ 07:01  -  21 @ 07:00  --------------------------------------------------------  IN:  Total IN: 0 mL    OUT:    Voided (mL): 300 mL  Total OUT: 300 mL    Total NET: -300 mL    HOME MEDICATIONS:  Augmentin 875 mg-125 mg oral tablet: 1 tab(s) orally every 12 hours for 10 days, patient completed 9 out of 10 days for sinus infection (2021 11:42)  collagen: 1 cap(s) orally once a day (2021 11:42)  Flonase 50 mcg/inh nasal spray: 1 spray(s) nasal 2 times a day (2021 11:42)  magnesium: 1 tab(s) orally once a day (2021 11:42)  Vitamin C 1000 mg oral tablet: 1 tab(s) orally once a day (2021 11:42)                             Current Admission Active Medications    acetaminophen   Tablet .. 650 milliGRAM(s) Oral every 6 hours PRN Temp greater or equal to 38C (100.4F), Mild Pain (1 - 3)  aspirin  chewable 81 milliGRAM(s) Oral daily  atorvastatin 40 milliGRAM(s) Oral at bedtime  fluticasone propionate 50 MICROgram(s)/spray Nasal Spray 1 Spray(s) Both Nostrils two times a day  heparin  Infusion 1000 Unit(s)/Hr (10 mL/Hr) IV Continuous <Continuous>  influenza   Vaccine 0.5 milliLiter(s) IntraMuscular once  sodium chloride 0.9% Bolus 1000 milliLiter(s) IV Bolus once  sodium chloride 0.9%. 1000 milliLiter(s) (75 mL/Hr) IV Continuous <Continuous>                        PHYSICAL EXAM:    Vital Signs Last 24 Hrs  T(C): 37.2 (2021 08:45), Max: 37.2 (2021 08:45)  T(F): 98.9 (2021 08:45), Max: 98.9 (2021 08:45)  HR: 88 (2021 10:14) (58 - 88)  BP: 134/76 (2021 10:14) (89/55 - 134/102)  BP(mean): 95 (2021 10:14) (66 - 114)  RR: 17 (2021 10:14) (15 - 24)  SpO2: 100% (2021 10:14) (96% - 100%)    GENERAL: NAD, anxious   NECK: Supple, No JVD  NERVOUS SYSTEM:  Alert & Oriented X3, non focal neuro exam, expressive aphasia   CHEST/LUNG: clear lungs, No rales, rhonchi, wheezing, or rubs  HEART: Regular rate and rhythm; s1 and s2 auscultated, No murmurs, rubs, or gallops  ABDOMEN: Soft, Nontender, Nondistended; Bowel sounds present and normoactive.   EXTREMITIES:  2+ Peripheral Pulses, No clubbing, cyanosis, or edema

## 2021-02-06 NOTE — PROGRESS NOTE ADULT - SUBJECTIVE AND OBJECTIVE BOX
[facility]                                        Neurology at [place]                                 Tomasa Recinos, & Néo                                  [address]                                        [place], [place], [address]                                             [phone]        CC: Stroke    HPI:   The patient is a 60y Female who presented as transfer from [facility] for stroke status post alteplase.  At 0920 she became acutely aphasic with right face and arm weakness/funny feeling.  She had NIH SS score of 2. She had head CT that did not show blood or large stroke and was given alteplase at 1035, CT-A head showed distal left MCA thrombus and CTP showed 67 cc penumbra. She was ultimately transferred to [facility] ([facility] for further care.    Interim history:  On 4 Tower.   This morning developed recurrence of expressive aphasia. Predominantly with word finding difficulty.   Vascular team present and code stroke was called.     ROS:   Denies headache or dizziness.  Denies chest pain.  Denies shortness of breath.    MEDICATIONS  (STANDING):  aspirin  chewable 81 milliGRAM(s) Oral daily  atorvastatin 40 milliGRAM(s) Oral at bedtime  enoxaparin Injectable 40 milliGRAM(s) SubCutaneous <User Schedule>  fluticasone propionate 50 MICROgram(s)/spray Nasal Spray 1 Spray(s) Both Nostrils two times a day  heparin  Infusion.  Unit(s)/Hr (7.5 mL/Hr) IV Continuous <Continuous>  influenza   Vaccine 0.5 milliLiter(s) IntraMuscular once  sodium chloride 0.9% Bolus 1000 milliLiter(s) IV Bolus once    Vital Signs Last 24 Hrs  T(C): 37.2 (06 Feb 2021 08:45), Max: 37.2 (06 Feb 2021 08:45)  T(F): 98.9 (06 Feb 2021 08:45), Max: 98.9 (06 Feb 2021 08:45)  HR: 62 (06 Feb 2021 08:45) (58 - 78)  BP: 15/82 (06 Feb 2021 08:45) (15/82 - 134/102)  BP(mean): 96 (06 Feb 2021 08:45) (66 - 114)  RR: 18 (06 Feb 2021 08:45) (15 - 24)  SpO2: 100% (06 Feb 2021 08:45) (96% - 100%)    Detailed Neurologic Exam:    Mental status: The patient is awake and alert. There is mild expressive aphasia. She follows instructions well. There is no dysarthria.     Cranial nerves: Pupils equal and react symmetrically to light. There is no visual field deficit to threat. Extraocular motion is full with no nystagmus. There is no ptosis. Facial sensation is intact. Facial musculature is symmetric. Palate elevates symmetrically. Tongue is midline.    Motor: There is normal bulk and tone.  There is no tremor.  Strength grossly 5/5 bilaterally.    Sensation: Grossly intact to light touch and pin.    Reflexes: 2+ throughout and plantar responses are flexor.    Cerebellar: No dysmetria on finger nose testing.    Labs:     02-06    142  |  109<H>  |  13.0  ----------------------------<  87  4.7   |  24.0  |  0.68    Ca    9.0      06 Feb 2021 06:08  Phos  3.8     02-06  Mg     2.0     02-06    TPro  5.7<L>  /  Alb  3.6  /  TBili  0.4  /  DBili  x   /  AST  14  /  ALT  13  /  AlkPhos  42  02-06                            11.9   5.76  )-----------( 201      ( 06 Feb 2021 06:08 )             35.5       Rad:   MRI brain images reviewed (and concur with report): There is no acute pathology.     Repeat CT head this morning reviewed. There is no acute change.

## 2021-02-06 NOTE — CHART NOTE - NSCHARTNOTEFT_GEN_A_CORE
CTA and CT perfusion was requested during the stroke code, however due to a miscommunication, it was not ordered by the rapid response team.  CTP is required to assess for core infarct and the amount of ischemic penumbra.  The patient is aphasic and the concern is for critical cerebral ischemia to an eloquent cortex.  The risk of nephrotoxicity is less than the risk of permenant neurologic dysfunction from cerebral ischemia.  We will hydrate the patient and monitor her renal function with serial lab studies.

## 2021-02-06 NOTE — PROGRESS NOTE ADULT - SUBJECTIVE AND OBJECTIVE BOX
CHIEF COMPLAINT/INTERVAL HISTORY:    Patient is a 60y old  Female who presents with a chief complaint of stroke (06 Feb 2021 14:41)      HPI:  60 year old right hand dominant female with no PMHx transferred from  with aphasia and right arm weakness that started at 920 this am. She states she was feeling in her usual state of health up until this morning when she was unable to speak to her  and became weak with her right arm. CODE Stroke was called at , NIH 2 and she received TPA at 1035. CTA/ CTP revealing thrombus distal to the L MCA Bifurication, 67ml of penumbra to left frontoparietal lobe. Pt transferred to [facility] for possible mechanical thrombectomy. Upon arrival, pt with improved symptoms, NIH 0. Pt does endorse recent sinus infection 2 weeks ago and completing abx course amoxicillin. Pt denies HA, dizziness, paresthesias, visual changes, difficulty with word finding, weakness.     NIH 0  mrS 0 (04 Feb 2021 15:09)      SUBJECTIVE & OBJECTIVE/ ROS: Pt seen and examined at bedside. Pt had a new episode of expressive aphasia at 10am. Stroke code called. Vascular sx & neurology at bedside.     ICU Vital Signs Last 24 Hrs  T(C): 37 (06 Feb 2021 16:21), Max: 37.2 (06 Feb 2021 08:45)  T(F): 98.6 (06 Feb 2021 16:21), Max: 98.9 (06 Feb 2021 08:45)  HR: 70 (06 Feb 2021 17:15) (58 - 88)  BP: 131/80 (06 Feb 2021 17:15) (103/87 - 144/85)  BP(mean): 93 (06 Feb 2021 17:15) (87 - 114)  ABP: --  ABP(mean): --  RR: 25 (06 Feb 2021 17:15) (15 - 25)  SpO2: 99% (06 Feb 2021 17:15) (96% - 100%)      REVIEW OF SYSTEMS:    CONSTITUTIONAL: No weakness, fevers or chills  EYES/ENT: No visual changes;  No vertigo or throat pain   NECK: No pain or stiffness  RESPIRATORY: No cough, wheezing, hemoptysis; No shortness of breath  CARDIOVASCULAR: No chest pain or palpitations  GASTROINTESTINAL: No abdominal or epigastric pain. No nausea, vomiting, or hematemesis; No diarrhea or constipation. No melena or hematochezia.  GENITOURINARY: No dysuria, frequency or hematuria  NEUROLOGICAL: No numbness or weakness  SKIN: No itching, rashes        MEDICATIONS  (STANDING):  aspirin  chewable 81 milliGRAM(s) Oral daily  atorvastatin 40 milliGRAM(s) Oral at bedtime  fluticasone propionate 50 MICROgram(s)/spray Nasal Spray 1 Spray(s) Both Nostrils two times a day  heparin  Infusion 1000 Unit(s)/Hr (10 mL/Hr) IV Continuous <Continuous>  influenza   Vaccine 0.5 milliLiter(s) IntraMuscular once  phenylephrine    Infusion 0.2 MICROgram(s)/kG/Min (4.76 mL/Hr) IV Continuous <Continuous>  sodium chloride 0.9%. 1000 milliLiter(s) (75 mL/Hr) IV Continuous <Continuous>    MEDICATIONS  (PRN):  acetaminophen   Tablet .. 650 milliGRAM(s) Oral every 6 hours PRN Temp greater or equal to 38C (100.4F), Mild Pain (1 - 3)      LABS:                        11.9   5.76  )-----------( 201      ( 06 Feb 2021 06:08 )             35.5     02-06    142  |  109<H>  |  13.0  ----------------------------<  87  4.7   |  24.0  |  0.68    Ca    9.0      06 Feb 2021 06:08  Phos  3.8     02-06  Mg     2.0     02-06    TPro  5.7<L>  /  Alb  3.6  /  TBili  0.4  /  DBili  x   /  AST  14  /  ALT  13  /  AlkPhos  42  02-06          CAPILLARY BLOOD GLUCOSE          RECENT CULTURES:        02-05-21 @ 07:01  -  02-06-21 @ 07:00  --------------------------------------------------------  IN: 770 mL / OUT: 300 mL / NET: 470 mL    02-06-21 @ 07:01  -  02-06-21 @ 18:22  --------------------------------------------------------  IN: 185.4 mL / OUT: 0 mL / NET: 185.4 mL          RADIOLOGY & ADDITIONAL TESTS:      PHYSICAL EXAM:    GENERAL: NAD, well-groomed, well-developed  HEAD:  Atraumatic, Normocephalic  EYES: EOMI, PERRLA, conjunctiva and sclera clear  ENMT: Moist mucous membranes  NECK: Supple, No JVD  NERVOUS SYSTEM:  Alert & Oriented X3, Motor Strength 5/5 B/L upper and lower extremities; DTRs 2+ intact and symmetric, expressive aphasia, no facial asymmetry  CHEST/LUNG: Clear to auscultation bilaterally; No rales, rhonchi, wheezing, or rubs  HEART: Regular rate and rhythm; No murmurs, rubs, or gallops  ABDOMEN: Soft, Nontender, Nondistended; Bowel sounds present  EXTREMITIES:  2+ Peripheral Pulses, No clubbing, cyanosis, or edema

## 2021-02-06 NOTE — PROGRESS NOTE ADULT - ASSESSMENT
59 y/o F with no PMHx, 3-5K runner daily, presents to  with aphasia, R arm weekend, Code stroke called- pt administered TPA at that tiem. CTA shows L MCA stroke. Pt transfered to [facility] for possible mechanical throbectomy. Symptoms have since resolved. Pt follows with Dr. Lockhart- cardiologist . Last stress test 2016-normal. TTE- "leaky valve, ut normal". former smoker, social drinker. Tele- SR, no arrythmia.     CVA  - CTA neck L- ICA occlusion  - CTA head- L MCA thrombus  - Tele- no arrythmia - SR, no ectopy  - Echo complete - EF 60-65%, no effusion, mild MR   - Today with Rapid response/ code stroke exacerbation of expressive aphasia   - CVA likely secondary to L ICA occlusion  - awaiting results of CTA perfusion scan     - patient to be transferred to neuro ICU   - heparin infusion stroke protocol started as per Neuro ICU  -as per Dr. Nino (Neurology) -  doubt would need MICHELE/ILR given carotid as likely the source of CVA  - Vascular following   - continue ASA, Statin

## 2021-02-06 NOTE — RAPID RESPONSE TEAM SUMMARY - NSOTHERINTERVENTIONSRRT_GEN_ALL_CORE
CT head   F/u with Neurology, Neuro ICU, Vascular Surgery    Pt with Left ICA occlusion   Plans for AC will be determined upon CT head results

## 2021-02-06 NOTE — RAPID RESPONSE TEAM SUMMARY - NSSITUATIONBACKGROUNDRRT_GEN_ALL_CORE
HPI:  60 year old right hand dominant female with no PMHx transferred from  with aphasia and right arm weakness that started at 920 this am. She states she was feeling in her usual state of health up until this morning when she was unable to speak to her  and became weak with her right arm. CODE Stroke was called at , NIH 2 and she received TPA at 1035. CTA/ CTP revealing thrombus distal to the L MCA Bifurication, 67ml of penumbra to left frontoparietal lobe. Pt transferred to [facility] for possible mechanical thrombectomy. Upon arrival, pt with improved symptoms, NIH 0. Pt does endorse recent sinus infection 2 weeks ago and completing abx course amoxicillin. Pt denies HA, dizziness, paresthesias, visual changes, difficulty with word finding, weakness.     NIH 0  mrS 0 (04 Feb 2021 15:09)      2/6/21 10:16 - Code stroke called for expressive aphasia which progressively got worse but then resolved partially

## 2021-02-06 NOTE — PROGRESS NOTE ADULT - SUBJECTIVE AND OBJECTIVE BOX
SUMMARY:HPI:  60 year old right hand dominant female with no PMHx transferred from  with aphasia and right arm weakness that started at 920 this am. She states she was feeling in her usual state of health up until this morning when she was unable to speak to her  and became weak with her right arm. CODE Stroke was called at , NIH 2 and she received TPA at 1035. CTA/ CTP revealing thrombus distal to the L MCA Bifurication, 67ml of penumbra to left frontoparietal lobe. Pt transferred to [facility] for possible mechanical thrombectomy. Upon arrival, pt with improved symptoms, NIH 0. Pt does endorse recent sinus infection 2 weeks ago and completing abx course amoxicillin. Pt denies HA, dizziness, paresthesias, visual changes, difficulty with word finding, weakness.     2/4/21- Aphasic ; R sided sensory loss- [facility]- 2    2/6/2121- Aphasic  and  decreased sensation RUE and LE-- NSCU admission  NIH =1 - aphasia    NIH 0  mrS 0 (04 Feb 2021 15:09)    CLINICAL TRIALS:  Allergies    No Known Allergies    Intolerances        REVIEW OF SYSTEMS: [X ] Unable to Assess due to neurologic exam    Neuro: [ ] Headache [ ] Back pain [ ] Numbness [ ] Weakness [ ] Ataxia [ ] Dizziness [ X] Aphasia [ ] Dysarthria [ ] Visual disturbance  Resp: [ ] Shortness of breath/dyspnea, [ ] Orthopnea [ ] Cough  CV: [ ] Chest pain [ ] Palpitation [ ] Lightheadedness [ ] Syncope  Renal: [ ] Thirst [ ] Edema  GI: [ ] Nausea [ ] Emesis [ ] Abdominal pain [ ] Constipation [ ] Diarrhea  Hem: [ ] Hematemesis [ ] bright red blood per rectum  ID: [ ] Fever [ ] Chills [ ] Dysuria  ENT: [ ] Rhinorrhea    DEVICES:   [ ] Restraints [ ] ET tube [ ] central line [ ] arterial line [ ] gerber [ ] NGT/OGT [ ] EVD [ ] LD [ ] SASHA/HMV [ ] Trach [ ] PEG [ ] Chest Tube     VITALS: [ ] Reviewed  Vital Signs Last 24 Hrs  T(C): 36.8 (05 Feb 2021 03:15), Max: 36.8 (04 Feb 2021 10:05)  T(F): 98.3 (05 Feb 2021 03:15), Max: 98.3 (04 Feb 2021 10:05)  HR: 55 (05 Feb 2021 06:35) (55 - 101)  BP: 97/73 (05 Feb 2021 06:35) (97/63 - 140/81)  BP(mean): 81 (05 Feb 2021 06:35) (74 - 102)  RR: 15 (05 Feb 2021 06:35) (12 - 27)  SpO2: 97% (05 Feb 2021 06:35) (95% - 100%)  CAPILLARY BLOOD GLUCOSE  92 (04 Feb 2021 11:08)      POCT Blood Glucose.: 101 mg/dL (04 Feb 2021 14:11)  POCT Blood Glucose.: 92 mg/dL (04 Feb 2021 10:04)      MEDICATIONS  (STANDING):  MEDICATIONS  (STANDING):  aspirin  chewable 81 milliGRAM(s) Oral daily  atorvastatin 20 milliGRAM(s) Oral at bedtime  fluticasone propionate 50 MICROgram(s)/spray Nasal Spray 1 Spray(s) Both Nostrils two times a day  heparin  Infusion 1000 Unit(s)/Hr (10 mL/Hr) IV Continuous <Continuous>  influenza   Vaccine 0.5 milliLiter(s) IntraMuscular once  sodium chloride 0.9% Bolus 1000 milliLiter(s) IV Bolus once  sodium chloride 0.9%. 1000 milliLiter(s) (75 mL/Hr) IV Continuous <Continuous>    MEDICATIONS  (PRN):  acetaminophen   Tablet .. 650 milliGRAM(s) Oral every 6 hours PRN Temp greater or equal to 38C (100.4F), Mild Pain (1 - 3)      MEDICATIONS  (PRN):  acetaminophen   Tablet .. 650 milliGRAM(s) Oral every 6 hours PRN Temp greater or equal to 38C (100.4F), Mild Pain (1 - 3)    I  05 Feb 2021 07:01  -  06 Feb 2021 07:00  --------------------------------------------------------  IN:    IV PiggyBack: 50 mL    Oral Fluid: 720 mL  Total IN: 770 mL    OUT:    Voided (mL): 300 mL  Total OUT: 300 mL    Total NET: 470 mL      06 Feb 2021 07:01  -  06 Feb 2021 13:28  --------------------------------------------------------  IN:  Heparin: 10 mL  Total IN: 10 mL    OUT:  Total OUT: 0 mL    Total NET: 10 mL        LABS:  PT/INR - ( 04 Feb 2021 10:09 )   PT: 11.5 sec;   INR: 0.98 ratio         PTT - ( 04 Feb 2021 10:09 )  PTT:30.8 sec  02-05    142  |  110<H>  |  11.0  ----------------------------<  103<H>  3.6   |  22.0  |  0.60    Ca    8.4<L>      05 Feb 2021 05:10  Phos  3.2     02-05  Mg     2.0     02-05    TPro  7.6  /  Alb  4.3  /  TBili  0.5  /  DBili  x   /  AST  22  /  ALT  31  /  AlkPhos  54  02-04                          12.6   6.94  )-----------( 222      ( 05 Feb 2021 05:10 )             36.8       STROKE CORE MEASURES:      MEDICATION LEVELS:     IMAGING/DATA: [ X] Reviewed    CT Perfusion w/ Maps w/ IV Cont (02.06.21 @ 12:27) >  INTERPRETATION:  Clinical indication: Code stroke.    Following injection of approximately 90 cc of Omnipaque 350 IV CT perfusion was performed    CBF<30%:0 ml  Tmax>6.0s: 60 mL  Mismatch volume: 60 mL  Mismatch ratio: Infinite.    IMPRESSION: CT perfusion as described above.     CT Angio Neck w/ IV Cont (02.06.21 @ 10:38) >  INTERPRETATION:  Clinical indications: Aphasia.    Multiple axial sections were performed from base of skull to vertex without contrast enhancement. Coronal and sagittal reconstructions were performed as well.    The size and configuration of the ventricles appear unchanged    Old lacunar infarct versus prominent VR space is again seen involving the left lenticular nucleus region    There is no evidence acute hemorrhage mass mass effect in the posterior fossa or supratentorial.    Evaluation of the structures with the appropriate window appears normal    The visualized paranasal sinuses mastoid and middle ear appear clear.    IMPRESSION: No significant change when allowing for differences in technique.    Findings discussed with Dr Elder on 3/6/2021 11:31 AM.  with read back.    The patient was injected with approximately 90 cc of Omnipaque 350 IV and CTA of the neck and [place] Weiss were performed. Coronal and sagittal reconstructions were performed as well.    Evaluation of both distal common carotid, proximal internal and external carotid arteries appear normal as well as both vertebral arteries. No significant stenosis is seen.    There is normal flow involving both distal internal carotid arteries. Evaluation of the anterior cerebral, right middle cerebral basilar and posterior cerebral arteries appear normal. Prominent P-comm is seen involving the right side. There is decreased flow related enhancement involving the left M2 and M3 segments which is likely compatible areas of occlusion.    The dural venous sinuses demonstrate normal enhancement.    Evaluation of the soft tissue neck region appears normal    The visualized portion of both lung apices appear clear.    IMPRESSION:   CT angiogram of the neck appears normal.    Decreased flow related enhancement is seen involving the left M2 and M3 segmen            EXAMINATION:  PHYSICAL EXAM:    Constitutional: No Acute Distress     Neurological: Awake, alert oriented to person, place and time, Following Commands, PERRL, EOMI, No Gaze Preference, Face Symmetrical, Mild aphasia , No dysmetria, No ataxia, No nystagmus     Motor exam:          Upper extremity                         Delt     Bicep     Tricep    HG                                                 R         5/5        5/5        5/5       5/5                                               L          5/5        5/5        5/5       5/5          Lower extremity                        HF         KF        KE       DF         PF                                                  R        5/5        5/5        5/5       5/5         5/5                                               L         5/5        5/5       5/5       5/5          5/5                                                 Sensation: [X ] slight decrease RUE/LE [ ] decreased:     Pulmonary: Clear to Auscultation, No rales, No rhonchi, No wheezes     Cardiovascular: S1, S2, Regular rate and rhythm     Gastrointestinal: Soft, Non-tender, Non-distended     Extremities: No calf tenderness

## 2021-02-06 NOTE — PROGRESS NOTE ADULT - ASSESSMENT
ASSESSMENT/PLAN:  60 year old female with no PMH presents to [facility] with stroke like symptoms s/p TPA at 1035. CTA/CTP with thrombus distal to L MCA bifurication. Symptoms resolved s/p TPA.    2nd event NIH 1. mRS 0. - 2/6/21    PLAN:  Neuro:  -Admit to neuro ICU-  q 1 hr neuro checks   - Dago-Synephrine drip-  Trial fo HTN augmentation - access neuro exam at each 20mmHg increment of  increase BP  for clinical improvement  - Bolus - 500c over 1 hr  - Re-eval by PT/OT- communication board  - ASA q day  -s/p TPA 2/4 1  - MRI with fat sats- report suggestive of dissection - recurrent event on ASA with likely thrombus associated with dissection - start heparin 1000u/hr - goal 50-70  - Suggest angio to define extent of occlusion/ dissection  - dysphagia screen- P  -PT/OT/ Speech      CV:   --180  -pending TTE  -Monitor on Telemetry  - LDL - goal 70 - atorvastatin 20mg  qday     Respiratory:  -RA  -SPO2 >92  -Incentive Spirometry    GI:   dysphagia screen     :  No gerber   Cr stable     Heme:  -s/p TPA  -SCDS/  lovenox 40mg q day    Endo:  pending A1C- 5.1  TSH- Nl      ID: Chronic sinusitis   Flonase  q day  Afebrile    SOCIAL/FAMILY:  [X] updated at bedside    CODE STATUS:  [X] Full Code    DISPOSITION:  [X] ICU     [X] Patient is  critically ill and monitor for recurrent stroke     Time spent: 40 critical care minutes

## 2021-02-06 NOTE — PROGRESS NOTE ADULT - SUBJECTIVE AND OBJECTIVE BOX
Interval History:  - Patient developed recurrent stroke symptoms (expressive aphasia) NIH 1 on my examination.  Patient personally evaluated during the stroke code, case discussed with Dr. Cantor, plan for CTH/A/P to be repeated and to start a heparin drip if no contraindication.  - Patient is s/p cerebral angio on xx/xx    Constitutional: NAD    Neuro  * Mental Status:  GCS 15:  E(4), V(5), M(6).  Awake, alert, oriented to conversation.  Patient's speech is limited by intermittent word finding difficulty.  The patient was able to state name, and her home town, but had difficulty to name the street she lived on.   Repeats phrases with no difficulty.  Names objects appropriately.  Follows two step commands.  * Cranial Nerves: Cnii-Cnxii grossly intact. PERRL, EOMI, tongue midline, no gaze deviation  * Motor: RUE 5/5, LUE 5/5, RLE 5/5, LLE 5/5  * Sensory: Sensation intact to light touch  * Reflexes: Not assessed  * Gait: Not assessed      Vitals:  Vital Signs Last 24 Hrs  T(C): 37.2 (06 Feb 2021 08:45), Max: 37.2 (06 Feb 2021 08:45)  T(F): 98.9 (06 Feb 2021 08:45), Max: 98.9 (06 Feb 2021 08:45)  HR: 62 (06 Feb 2021 08:45) (58 - 78)  BP: 15/82 (06 Feb 2021 08:45) (15/82 - 134/102)  BP(mean): 96 (06 Feb 2021 08:45) (66 - 114)  RR: 18 (06 Feb 2021 08:45) (15 - 24)  SpO2: 100% (06 Feb 2021 08:45) (96% - 100%)    I&O:  I&O's Summary    05 Feb 2021 07:01  -  06 Feb 2021 07:00  --------------------------------------------------------  IN: 770 mL / OUT: 300 mL / NET: 470 mL        Labs:                         11.9   5.76  )-----------( 201      ( 06 Feb 2021 06:08 )             35.5       02-06    142  |  109<H>  |  13.0  ----------------------------<  87  4.7   |  24.0  |  0.68    Ca    9.0      06 Feb 2021 06:08  Phos  3.8     02-06  Mg     2.0     02-06    TPro  5.7<L>  /  Alb  3.6  /  TBili  0.4  /  DBili  x   /  AST  14  /  ALT  13  /  AlkPhos  42  02-06      LIVER FUNCTIONS - ( 06 Feb 2021 06:08 )  Alb: 3.6 g/dL / Pro: 5.7 g/dL / ALK PHOS: 42 U/L / ALT: 13 U/L / AST: 14 U/L / GGT: x                 Neurosurgical Imaging:  I attest that I personally reviewed all pertinent neurosurgical imaging performed in the last 24 hours    Assessment & Plan:  60 year old right hand dominant female with no PMHx transferred from  with aphasia and right arm weakness that started at 920 this am. She states she was feeling in her usual state of health up until this morning when she was unable to speak to her  and became weak with her right arm. CODE Stroke was called at , NIH 2 and she received TPA at 1035. CTA/ CTP revealing thrombus distal to the L MCA Bifurication, 67ml of penumbra to left frontoparietal lobe. Pt transferred to [facility] for possible mechanical thrombectomy. Upon arrival, pt with improved symptoms, NIH 0. Pt does endorse recent sinus infection 2 weeks ago and completing abx course amoxicillin. Pt denies HA, dizziness, paresthesias, visual changes, difficulty with word finding, weakness.     NIH 0  --> 1 on 2/6/21 at 10:30 am  mrS 0 (04 Feb 2021 15:09)      Continue neuro checks  Repeat CT Scan, CTA head and neck and CT perfusion STAT  Maintain SBP within desired range 120-160  Continue antiplatelet / anticoagulation: aspirin.  Start Heparin Drip after repeat neuro-imaging if no contraindication.  The patient does not require urgent neurointerventional procedures for the treatment of stroke at this time as discussed with Dr. Cantor, the patient will be started on heparin drip   We will continue to follow the patient's clinical course  Case discussed with Dr. Thomas due to recent downgrade from the ICU, he wishes the patient to be monitored in the neuro ICU in the short term

## 2021-02-07 LAB
ANION GAP SERPL CALC-SCNC: 10 MMOL/L — SIGNIFICANT CHANGE UP (ref 5–17)
APTT BLD: 113.1 SEC — HIGH (ref 27.5–35.5)
APTT BLD: 174.3 SEC — CRITICAL HIGH (ref 27.5–35.5)
APTT BLD: 46.5 SEC — HIGH (ref 27.5–35.5)
BUN SERPL-MCNC: 10 MG/DL — SIGNIFICANT CHANGE UP (ref 8–20)
CALCIUM SERPL-MCNC: 8.6 MG/DL — SIGNIFICANT CHANGE UP (ref 8.6–10.2)
CHLORIDE SERPL-SCNC: 108 MMOL/L — HIGH (ref 98–107)
CO2 SERPL-SCNC: 18 MMOL/L — LOW (ref 22–29)
CREAT SERPL-MCNC: 0.5 MG/DL — SIGNIFICANT CHANGE UP (ref 0.5–1.3)
GLUCOSE SERPL-MCNC: 98 MG/DL — SIGNIFICANT CHANGE UP (ref 70–99)
HCT VFR BLD CALC: 37 % — SIGNIFICANT CHANGE UP (ref 34.5–45)
HGB BLD-MCNC: 12.7 G/DL — SIGNIFICANT CHANGE UP (ref 11.5–15.5)
MAGNESIUM SERPL-MCNC: 2 MG/DL — SIGNIFICANT CHANGE UP (ref 1.6–2.6)
MCHC RBC-ENTMCNC: 31.7 PG — SIGNIFICANT CHANGE UP (ref 27–34)
MCHC RBC-ENTMCNC: 34.3 GM/DL — SIGNIFICANT CHANGE UP (ref 32–36)
MCV RBC AUTO: 92.3 FL — SIGNIFICANT CHANGE UP (ref 80–100)
PHOSPHATE SERPL-MCNC: 4 MG/DL — SIGNIFICANT CHANGE UP (ref 2.4–4.7)
PLATELET # BLD AUTO: 222 K/UL — SIGNIFICANT CHANGE UP (ref 150–400)
POTASSIUM SERPL-MCNC: 4.4 MMOL/L — SIGNIFICANT CHANGE UP (ref 3.5–5.3)
POTASSIUM SERPL-SCNC: 4.4 MMOL/L — SIGNIFICANT CHANGE UP (ref 3.5–5.3)
RBC # BLD: 4.01 M/UL — SIGNIFICANT CHANGE UP (ref 3.8–5.2)
RBC # FLD: 11.5 % — SIGNIFICANT CHANGE UP (ref 10.3–14.5)
SODIUM SERPL-SCNC: 136 MMOL/L — SIGNIFICANT CHANGE UP (ref 135–145)
WBC # BLD: 10.55 K/UL — HIGH (ref 3.8–10.5)
WBC # FLD AUTO: 10.55 K/UL — HIGH (ref 3.8–10.5)

## 2021-02-07 PROCEDURE — 70551 MRI BRAIN STEM W/O DYE: CPT | Mod: 26

## 2021-02-07 PROCEDURE — 70549 MR ANGIOGRAPH NECK W/O&W/DYE: CPT | Mod: 26

## 2021-02-07 PROCEDURE — 99233 SBSQ HOSP IP/OBS HIGH 50: CPT

## 2021-02-07 PROCEDURE — 70450 CT HEAD/BRAIN W/O DYE: CPT | Mod: 26

## 2021-02-07 PROCEDURE — 99291 CRITICAL CARE FIRST HOUR: CPT

## 2021-02-07 RX ORDER — MIDODRINE HYDROCHLORIDE 2.5 MG/1
5 TABLET ORAL EVERY 8 HOURS
Refills: 0 | Status: DISCONTINUED | OUTPATIENT
Start: 2021-02-07 | End: 2021-02-07

## 2021-02-07 RX ORDER — MIDODRINE HYDROCHLORIDE 2.5 MG/1
10 TABLET ORAL EVERY 8 HOURS
Refills: 0 | Status: DISCONTINUED | OUTPATIENT
Start: 2021-02-07 | End: 2021-02-08

## 2021-02-07 RX ORDER — LIDOCAINE 4 G/100G
1 CREAM TOPICAL DAILY
Refills: 0 | Status: DISCONTINUED | OUTPATIENT
Start: 2021-02-07 | End: 2021-02-12

## 2021-02-07 RX ORDER — METHOCARBAMOL 500 MG/1
500 TABLET, FILM COATED ORAL ONCE
Refills: 0 | Status: COMPLETED | OUTPATIENT
Start: 2021-02-07 | End: 2021-02-08

## 2021-02-07 RX ORDER — SODIUM CHLORIDE 9 MG/ML
500 INJECTION INTRAMUSCULAR; INTRAVENOUS; SUBCUTANEOUS ONCE
Refills: 0 | Status: COMPLETED | OUTPATIENT
Start: 2021-02-07 | End: 2021-02-07

## 2021-02-07 RX ORDER — HEPARIN SODIUM 5000 [USP'U]/ML
700 INJECTION INTRAVENOUS; SUBCUTANEOUS
Qty: 25000 | Refills: 0 | Status: DISCONTINUED | OUTPATIENT
Start: 2021-02-07 | End: 2021-02-07

## 2021-02-07 RX ORDER — HEPARIN SODIUM 5000 [USP'U]/ML
800 INJECTION INTRAVENOUS; SUBCUTANEOUS
Qty: 25000 | Refills: 0 | Status: DISCONTINUED | OUTPATIENT
Start: 2021-02-07 | End: 2021-02-08

## 2021-02-07 RX ADMIN — PHENYLEPHRINE HYDROCHLORIDE 4.76 MICROGRAM(S)/KG/MIN: 10 INJECTION INTRAVENOUS at 01:06

## 2021-02-07 RX ADMIN — LIDOCAINE 1 PATCH: 4 CREAM TOPICAL at 20:02

## 2021-02-07 RX ADMIN — Medication 1 SPRAY(S): at 06:20

## 2021-02-07 RX ADMIN — SODIUM CHLORIDE 500 MILLILITER(S): 9 INJECTION INTRAMUSCULAR; INTRAVENOUS; SUBCUTANEOUS at 13:00

## 2021-02-07 RX ADMIN — Medication 650 MILLIGRAM(S): at 20:03

## 2021-02-07 RX ADMIN — Medication 81 MILLIGRAM(S): at 12:52

## 2021-02-07 RX ADMIN — MIDODRINE HYDROCHLORIDE 10 MILLIGRAM(S): 2.5 TABLET ORAL at 12:52

## 2021-02-07 RX ADMIN — HEPARIN SODIUM 8 UNIT(S)/HR: 5000 INJECTION INTRAVENOUS; SUBCUTANEOUS at 17:54

## 2021-02-07 RX ADMIN — SODIUM CHLORIDE 75 MILLILITER(S): 9 INJECTION INTRAMUSCULAR; INTRAVENOUS; SUBCUTANEOUS at 06:20

## 2021-02-07 RX ADMIN — MIDODRINE HYDROCHLORIDE 10 MILLIGRAM(S): 2.5 TABLET ORAL at 20:02

## 2021-02-07 RX ADMIN — Medication 650 MILLIGRAM(S): at 01:06

## 2021-02-07 RX ADMIN — ATORVASTATIN CALCIUM 40 MILLIGRAM(S): 80 TABLET, FILM COATED ORAL at 20:02

## 2021-02-07 RX ADMIN — PHENYLEPHRINE HYDROCHLORIDE 4.76 MICROGRAM(S)/KG/MIN: 10 INJECTION INTRAVENOUS at 06:20

## 2021-02-07 NOTE — PROGRESS NOTE ADULT - ASSESSMENT
60y Female who is followed by neurology because of stroke.    Stroke.   Left hemisphere symptoms.   Has occluded left ICA.   Seen by vascular team and no intervention to offer.   At this point patient having recurrence of symptoms referable to left ICA.  I would therefore suggest anticoagulation for presumed stump emboli.  Heparin on hold secondary to high PTT and ? SAH on MRI.   Repeat CT ordered to assess this further.   MRA results pending to assess for dissection.     Would agree with having intervention team see patient in am to assess need for angiogram.     Discussed with ICU team (Dr Thomas attending).

## 2021-02-07 NOTE — PROGRESS NOTE ADULT - SUBJECTIVE AND OBJECTIVE BOX
[facility]                                                        Office: [address] [address]                                                       Telephone: [phone]. Fax:[phone]                                                                             PROGRESS NOTE   Reason for follow up:                              Overnight: No new events.   Update:     Subjective: "  ______________________"   Complains of:   Review of symptoms: Cardiac:  No chest pain. No dyspnea. No palpitations.  Respiratory:no cough. No dyspnea  Gastrointestinal: No diarrhea. No abdominal pain. No bleeding.     Past medical history: No updates.   Chronic conditions:  Hypertension: controlled. CHF: Stable. CAD: Stable ischemic heart disease. DM: Stable;    	  Vitals:  T(C): 36.6 (02-07-21 @ 08:00), Max: 37.1 (02-06-21 @ 19:32)  HR: 49 (02-07-21 @ 13:45) (40 - 71)  BP: 164/77 (02-07-21 @ 13:45) (109/68 - 164/81)  RR: 21 (02-07-21 @ 13:45) (10 - 25)  SpO2: 100% (02-07-21 @ 13:45) (96% - 100%)  Wt(kg): --  I&O's Summary    06 Feb 2021 07:01  -  07 Feb 2021 07:00  --------------------------------------------------------  IN: 1922.2 mL / OUT: 1350 mL / NET: 572.2 mL    07 Feb 2021 07:01  -  07 Feb 2021 15:28  --------------------------------------------------------  IN: 871.4 mL / OUT: 1350 mL / NET: -478.6 mL      Weight (kg): 63.5 (02-04 @ 15:24), 58.3 (02-04 @ 10:05)    PHYSICAL EXAM:  Appearance: Comfortable. No acute distress  HEENT:  Head and neck: Atraumatic. Normocephalic.  Normal oral mucosa, PERRL, Neck is supple. No JVD, No carotid bruit.   Neurologic: A & O x 3, no focal deficits. EOMI , Cranial nerves are intact.  Lymphatic: No cervical lymphadenopathy  Cardiovascular: Normal S1 S2, No murmur, rubs/gallops. No JVD, No edema  Respiratory: Lungs clear to auscultation  Gastrointestinal:  Soft, Non-tender, + BS  Lower Extremities: No edema  Psychiatry: Patient is calm. No agitation. Mood & affect appropriate  Skin: No rashes/ ecchymoses/cyanosis/ulcers visualized on the face, hands or feet.    CURRENT MEDICATIONS:  midodrine 10 milliGRAM(s) Oral every 8 hours  phenylephrine    Infusion 0.2 MICROgram(s)/kG/Min IV Continuous <Continuous>    atorvastatin  aspirin  chewable  fluticasone propionate 50 MICROgram(s)/spray Nasal Spray  influenza   Vaccine  sodium chloride 0.9% Bolus  sodium chloride 0.9%.      LABS:	 	                              12.7   10.55 )-----------( 222      ( 07 Feb 2021 03:47 )             37.0     02-07    136  |  108<H>  |  10.0  ----------------------------<  98  4.4   |  18.0<L>  |  0.50    Ca    8.6      07 Feb 2021 03:47  Phos  4.0     02-07  Mg     2.0     02-07    TPro  5.7<L>  /  Alb  3.6  /  TBili  0.4  /  DBili  x   /  AST  14  /  ALT  13  /  AlkPhos  42  02-06    proBNP:   Lipid Profile: Date: 02-05 @ 05:10  Total cholesterol 169; Direct LDL: --; HDL: 64; Triglycerides:62    HgA1c: TSH: Thyroid Stimulating Hormone, Serum: 2.51 uIU/mL      TELEMETRY: Reviewed    ECG:  Reviewed by me. 	    DIAGNOSTIC TESTING:  [ ] Echocardiogram:   [ ]  Catheterization:  [ ] Stress Test:    OTHER: 	                                                                [facility]                                                        Office: [address] [address]                                                       Telephone: [phone]. Fax:[phone]                                                                             PROGRESS NOTE   Reason for follow up:    stroke                          Overnight: No new events.   Update:   2/7/2021, sitting up in chair, no new neurologic deficits noted.   Seen by neuro and recommending:   Anticoagulation for presumed stump emboli.  Heparin on hold secondary to high PTT and ? SAH on MRI.   Repeat CT ordered to assess this further.   MRA results pending to assess for dissection.     Subjective: "I could not speak and had a stroke, I run every day and cant believe this happened to me"   Complains of:  Nothing  Father with significant cardiac disease, CABG x 2, and decreased heart function.     Review of symptoms: Cardiac:  No chest pain. No dyspnea. No palpitations.  Respiratory: no cough. No dyspnea  Gastrointestinal: No diarrhea. No abdominal pain. No bleeding.     Past medical history: No updates.   Chronic conditions:   Hx, 3-5K runner daily, presents to  with aphasia, R arm weekend,  - Did not take any medications.    Vitals:  T(C): 36.6 (02-07-21 @ 08:00), Max: 37.1 (02-06-21 @ 19:32)  HR: 49 (02-07-21 @ 13:45) (40 - 71)  BP: 164/77 (02-07-21 @ 13:45) (109/68 - 164/81)  RR: 21 (02-07-21 @ 13:45) (10 - 25)  SpO2: 100% (02-07-21 @ 13:45) (96% - 100%)  I&O's Summary  06 Feb 2021 07:01  -  07 Feb 2021 07:00  --------------------------------------------------------  IN: 1922.2 mL / OUT: 1350 mL / NET: 572.2 mL  07 Feb 2021 07:01  -  07 Feb 2021 15:28  --------------------------------------------------------  IN: 871.4 mL / OUT: 1350 mL / NET: -478.6 mL  Weight (kg): 63.5 (02-04 @ 15:24), 58.3 (02-04 @ 10:05)    PHYSICAL EXAM:  Appearance: Comfortable. No acute distress  HEENT:  Head and neck: Atraumatic. Normocephalic.  Normal oral mucosa, PERRL, Neck is supple. No JVD, No carotid bruit.   Neurologic: A & O x 3, no focal deficits.   Lymphatic: No cervical lymphadenopathy  Cardiovascular: Normal S1 S2, No murmur, rubs/gallops. No JVD, No edema  Respiratory: Lungs clear to auscultation  Gastrointestinal:  Soft, Non-tender, + BS  Lower Extremities: No edema  Psychiatry: Patient is calm. No agitation. Mood & affect appropriate  Skin: No rash, warm and dry.      CURRENT MEDICATIONS:  midodrine 10 milliGRAM(s) Oral every 8 hours  phenylephrine    Infusion 0.2 MICROgram(s)/kG/Min IV Continuous <Continuous>  atorvastatin  aspirin  chewable  fluticasone propionate 50 MICROgram(s)/spray Nasal Spray  influenza   Vaccine  sodium chloride 0.9% Bolus  sodium chloride 0.9%.    LABS:	 	                       12.7   10.55 )-----------( 222      ( 07 Feb 2021 03:47 )             37.0   02-07  136  |  108<H>  |  10.0  ----------------------------<  98  4.4   |  18.0<L>  |  0.50  Ca    8.6      07 Feb 2021 03:47  Phos  4.0     02-07  Mg     2.0     02-07  TPro  5.7<L>  /  Alb  3.6  /  TBili  0.4  /  DBili  x   /  AST  14  /  ALT  13  /  AlkPhos  42  02-06  Liipid Profile: Date: 02-05 @ 05:10  Total cholesterol 169; Direct LDL: --; HDL: 64; Triglycerides:62  HgA1c: TSH: Thyroid Stimulating Hormone, Serum: 2.51 uIU/mL    TELEMETRY: Reviewed  SB   :

## 2021-02-07 NOTE — PROGRESS NOTE ADULT - ASSESSMENT
61 y/o F with no PMHx, 3-5K runner daily, presents to  with aphasia, R arm weekend, Code stroke called- pt administered TPA at that tiem. CTA shows L MCA stroke. Pt transfered to [facility] for possible mechanical throbectomy. Symptoms have since resolved. Pt follows with Dr. Lockhart- cardiologist . Last stress test 2016-normal. TTE- "leaky valve, ut normal". former smoker, social drinker. Tele- SR, no arrythmia.     CVA  - CTA neck L- ICA occlusion  - CTA head- L MCA thrombus  - Tele- no arrythmia - SR, no ectopy  - Echo complete - EF 60-65%, no effusion, mild MR   - Today with Rapid response/ code stroke exacerbation of expressive aphasia   - CVA likely secondary to L ICA occlusion  - awaiting results of CTA perfusion scan     - patient to be transferred to neuro ICU   - heparin infusion stroke protocol started as per Neuro ICU  -as per Dr. Nino (Neurology) -  doubt would need MICHELE/ILR given carotid as likely the source of CVA  - Vascular following   - continue ASA, Statin  ip.     61 y/o F with no PMHx, 3-5K runner daily, presents to  with aphasia, R arm weekend, Code stroke called- pt administered TPA at that tiem. CTA shows L MCA stroke. Pt transferred to [facility] for possible mechanical throbectomy. Symptoms have since resolved. Pt follows with Dr. Lockhart- cardiologist . Last stress test 2016-normal. TTE- "leaky valve, ut normal". former smoker, social drinker. Tele- SB today, patient states she is an avid runner 3K/day    2/7/2021, sitting up in chair, no new neurologic deficits noted.   Seen by neuro and recommending:    Anticoagulation for presumed stump emboli.  Heparin on hold secondary to high PTT and ? SAH on MRI.   Repeat CT ordered to assess this further.   MRA results pending to assess for dissection.       CVA  - CTA neck L- ICA occlusion  - CTA head- L MCA thrombus  - Tele- no arrythmia - SB, no ectopy - given hx of avid runner maybe a normal variant.    - Echo complete - EF 60-65%, no effusion, mild MR   - Today with Rapid response/ code stroke exacerbation of expressive aphasia   - CVA likely secondary to L ICA occlusion  - awaiting results of CTA perfusion scan     - patient to be transferred to neuro ICU   - heparin infusion stroke protocol started as per Neuro ICU  -as per Dr. Nino (Neurology) -  doubt would need MICHELE/ILR given carotid as likely the source of CVA  - Vascular following   - continue ASA, Statin  -_ No MICHELE at this. continue care as per ICU and neurology.    -Continue to monitoring

## 2021-02-07 NOTE — PROGRESS NOTE ADULT - SUBJECTIVE AND OBJECTIVE BOX
SUMMARY:HPI:  60 year old right hand dominant female with no PMHx transferred from  with aphasia and right arm weakness that started at 920 this am. She states she was feeling in her usual state of health up until this morning when she was unable to speak to her  and became weak with her right arm. CODE Stroke was called at , [facility] 2 and she received TPA at 1035. CTA/ CTP revealing thrombus distal to the L MCA Bifurication, 67ml of penumbra to left frontoparietal lobe. Pt transferred to [facility] for possible mechanical thrombectomy. Upon arrival, pt with improved symptoms, NIH 0. Pt does endorse recent sinus infection 2 weeks ago and completing abx course amoxicillin. Pt denies HA, dizziness, paresthesias, visual changes, difficulty with word finding, weakness.     2/4/21- Aphasic ; R sided sensory loss- [facility]- 2    2/6/2121- Aphasic  and  decreased sensation RUE and LE-- [facility] admission  NIH =1 - aphasia                - Aphasia and RUE sensation deficit improved at augmented BP to > 135 mmHg while on neosynephrine    NIH 0  mrS 0 (04 Feb 2021 15:09)    CLINICAL TRIALS:  Allergies    No Known Allergies    Intolerances        REVIEW OF SYSTEMS: [X ] Unable to Assess due to neurologic exam    Neuro: [ ] Headache [ ] Back pain [ ] Numbness [ ] Weakness [ ] Ataxia [ ] Dizziness [ X] Aphasia [ ] Dysarthria [ ] Visual disturbance  Resp: [ ] Shortness of breath/dyspnea, [ ] Orthopnea [ ] Cough  CV: [ ] Chest pain [ ] Palpitation [ ] Lightheadedness [ ] Syncope  Renal: [ ] Thirst [ ] Edema  GI: [ ] Nausea [ ] Emesis [ ] Abdominal pain [ ] Constipation [ ] Diarrhea  Hem: [ ] Hematemesis [ ] bright red blood per rectum  ID: [ ] Fever [ ] Chills [ ] Dysuria  ENT: [ ] Rhinorrhea    DEVICES:   [ ] Restraints [ ] ET tube [ ] central line [ ] arterial line [ ] gerber [ ] NGT/OGT [ ] EVD [ ] LD [ ] SASHA/HMV [ ] Trach [ ] PEG [ ] Chest Tube     ICU Vital Signs Last 24 Hrs  T(C): 36.9 (07 Feb 2021 04:12), Max: 37.2 (06 Feb 2021 08:45)  T(F): 98.4 (07 Feb 2021 04:12), Max: 98.9 (06 Feb 2021 08:45)  HR: 46 (07 Feb 2021 06:30) (40 - 88)  BP: 132/74 (07 Feb 2021 06:30) (103/87 - 161/106)  BP(mean): 91 (07 Feb 2021 06:30) (82 - 122)  RR: 10 (07 Feb 2021 06:30) (10 - 25)  SpO2: 98% (07 Feb 2021 06:30) (96% - 100%)        POCT Blood Glucose.: 101 mg/dL (04 Feb 2021 14:11)  POCT Blood Glucose.: 92 mg/dL (04 Feb 2021 10:04)    MEDICATIONS  (STANDING):  aspirin  chewable 81 milliGRAM(s) Oral daily  atorvastatin 40 milliGRAM(s) Oral at bedtime  fluticasone propionate 50 MICROgram(s)/spray Nasal Spray 1 Spray(s) Both Nostrils two times a day  heparin  Infusion 1000 Unit(s)/Hr (10 mL/Hr) IV Continuous <Continuous>  influenza   Vaccine 0.5 milliLiter(s) IntraMuscular once  phenylephrine    Infusion 0.2 MICROgram(s)/kG/Min (4.76 mL/Hr) IV Continuous <Continuous>  sodium chloride 0.9% Bolus 500 milliLiter(s) IV Bolus once  sodium chloride 0.9% Bolus 250 milliLiter(s) IV Bolus once  sodium chloride 0.9%. 1000 milliLiter(s) (75 mL/Hr) IV Continuous <Continuous>    MEDICATIONS  (PRN):  acetaminophen   Tablet .. 650 milliGRAM(s) Oral every 6 hours PRN Temp greater or equal to 38C (100.4F), Mild Pain (1 - 3)      06 Feb 2021 07:01  -  07 Feb 2021 07:00  --------------------------------------------------------  IN:    Heparin: 170 mL    Oral Fluid: 460 mL    Phenylephrine: 170.6 mL    sodium chloride 0.9%: 825 mL  Total IN: 1625.6 mL    OUT:    Voided (mL): 1350 mL  Total OUT: 1350 mL    Total NET: 275.6 mL        I  05 Feb 2021 07:01  -  06 Feb 2021 07:00  --------------------------------------------------------  Total NET: 470 mL      06 Feb 2021 07:01  -  06 Feb 2021 13:28  --------------------------------------------------------    Total NET: 10 mL    LABS:  PTT - ( 07 Feb 2021 06:30 )  PTT:113.1 sec                          12.7   10.55 )-----------( 222      ( 07 Feb 2021 03:47 )             37.0       142  |  110<H>  |  11.0  ----------------------------<  103<H>  3.6   |  22.0  |  0.60    Ca    8.4<L>      05 Feb 2021 05:10  Phos  3.2     02-05  Mg     2.0     02-05    TPro  7.6  /  Alb  4.3  /  TBili  0.5  /  DBili  x   /  AST  22  /  ALT  31  /  AlkPhos  54  02-04      STROKE CORE MEASURES:   HGBA1C- 5.1  LDL- 93  TSH- 2.54       IMAGING/DATA: [ X] Reviewed        CT Perfusion w/ Maps w/ IV Cont (02.06.21 @ 12:27) >  INTERPRETATION:  Clinical indication: Code stroke.    Following injection of approximately 90 cc of Omnipaque 350 IV CT perfusion was performed    CBF<30%:0 ml  Tmax>6.0s: 60 mL  Mismatch volume: 60 mL  Mismatch ratio: Infinite.    IMPRESSION: CT perfusion as described above.     CT Angio Neck w/ IV Cont (02.06.21 @ 10:38) >  INTERPRETATION:  Clinical indications: Aphasia.    Multiple axial sections were performed from base of skull to vertex without contrast enhancement. Coronal and sagittal reconstructions were performed as well.    The size and configuration of the ventricles appear unchanged    Old lacunar infarct versus prominent VR space is again seen involving the left lenticular nucleus region    There is no evidence acute hemorrhage mass mass effect in the posterior fossa or supratentorial.    Evaluation of the structures with the appropriate window appears normal    The visualized paranasal sinuses mastoid and middle ear appear clear.    IMPRESSION: No significant change when allowing for differences in technique.    Findings discussed with Dr Elder on 3/6/2021 11:31 AM.  with read back.    The patient was injected with approximately 90 cc of Omnipaque 350 IV and CTA of the neck and [place] Weiss were performed. Coronal and sagittal reconstructions were performed as well.    Evaluation of both distal common carotid, proximal internal and external carotid arteries appear normal as well as both vertebral arteries. No significant stenosis is seen.    There is normal flow involving both distal internal carotid arteries. Evaluation of the anterior cerebral, right middle cerebral basilar and posterior cerebral arteries appear normal. Prominent P-comm is seen involving the right side. There is decreased flow related enhancement involving the left M2 and M3 segments which is likely compatible areas of occlusion.    The dural venous sinuses demonstrate normal enhancement.    Evaluation of the soft tissue neck region appears normal    The visualized portion of both lung apices appear clear.    IMPRESSION:   CT angiogram of the neck appears normal.    Decreased flow related enhancement is seen involving the left M2 and M3 segmen            EXAMINATION:  PHYSICAL EXAM:    Constitutional: No Acute Distress     Neurological: Awake, alert oriented to person, place and time, Following Commands, PERRL, EOMI, No Gaze Preference, Face Symmetrical, Mild aphasia , No dysmetria, No ataxia, No nystagmus     Motor exam:          Upper extremity                         Delt     Bicep     Tricep    HG                                                 R         5/5        5/5        5/5       5/5                                               L          5/5        5/5        5/5       5/5          Lower extremity                        HF         KF        KE       DF         PF                                                  R        5/5        5/5        5/5       5/5         5/5                                               L         5/5        5/5       5/5       5/5          5/5                                                 Sensation: [X ] slight decrease RUE/LE [ ] decreased:     Pulmonary: Clear to Auscultation, No rales, No rhonchi, No wheezes     Cardiovascular: S1, S2, Regular rate and rhythm     Gastrointestinal: Soft, Non-tender, Non-distended     Extremities: No calf tenderness        SUMMARY:HPI:  60 year old right hand dominant female with no PMHx transferred from  with aphasia and right arm weakness that started at 920 this am. She states she was feeling in her usual state of health up until this morning when she was unable to speak to her  and became weak with her right arm. CODE Stroke was called at , [facility] 2 and she received TPA at 1035. CTA/ CTP revealing thrombus distal to the L MCA Bifurication, 67ml of penumbra to left frontoparietal lobe. Pt transferred to [facility] for possible mechanical thrombectomy. Upon arrival, pt with improved symptoms, NIH 0. Pt does endorse recent sinus infection 2 weeks ago and completing abx course amoxicillin. Pt denies HA, dizziness, paresthesias, visual changes, difficulty with word finding, weakness.     2/4/21- Aphasic ; R sided sensory loss- [facility]- 2    2/6/2121- Aphasic  and  decreased sensation RUE and LE-- [facility] admission  NIH =1 - aphasia                - Aphasia and RUE sensation deficit improved at augmented BP to > 135 mmHg while on neosynephrine    NIH 0  mrS 0 (04 Feb 2021 15:09)    CLINICAL TRIALS:  Allergies    No Known Allergies    Intolerances        REVIEW OF SYSTEMS: [X ] Unable to Assess due to neurologic exam    Neuro: [ ] Headache [ ] Back pain [ ] Numbness [ ] Weakness [ ] Ataxia [ ] Dizziness [ X] Aphasia [ ] Dysarthria [ ] Visual disturbance  Resp: [ ] Shortness of breath/dyspnea, [ ] Orthopnea [ ] Cough  CV: [ ] Chest pain [ ] Palpitation [ ] Lightheadedness [ ] Syncope  Renal: [ ] Thirst [ ] Edema  GI: [ ] Nausea [ ] Emesis [ ] Abdominal pain [ ] Constipation [ ] Diarrhea  Hem: [ ] Hematemesis [ ] bright red blood per rectum  ID: [ ] Fever [ ] Chills [ ] Dysuria  ENT: [ ] Rhinorrhea    DEVICES:   [ ] Restraints [ ] ET tube [ ] central line [ ] arterial line [ ] gerber [ ] NGT/OGT [ ] EVD [ ] LD [ ] SASHA/HMV [ ] Trach [ ] PEG [ ] Chest Tube     ICU Vital Signs Last 24 Hrs  T(C): 36.9 (07 Feb 2021 04:12), Max: 37.2 (06 Feb 2021 08:45)  T(F): 98.4 (07 Feb 2021 04:12), Max: 98.9 (06 Feb 2021 08:45)  HR: 46 (07 Feb 2021 06:30) (40 - 88)  BP: 132/74 (07 Feb 2021 06:30) (103/87 - 161/106)  BP(mean): 91 (07 Feb 2021 06:30) (82 - 122)  RR: 10 (07 Feb 2021 06:30) (10 - 25)  SpO2: 98% (07 Feb 2021 06:30) (96% - 100%)    POCT Blood Glucose.: 101 mg/dL (04 Feb 2021 14:11)  POCT Blood Glucose.: 92 mg/dL (04 Feb 2021 10:04)    MEDICATIONS  (STANDING):  aspirin  chewable 81 milliGRAM(s) Oral daily  atorvastatin 40 milliGRAM(s) Oral at bedtime  fluticasone propionate 50 MICROgram(s)/spray Nasal Spray 1 Spray(s) Both Nostrils two times a day  heparin  Infusion 1000 Unit(s)/Hr (10 mL/Hr) IV Continuous <Continuous>  influenza   Vaccine 0.5 milliLiter(s) IntraMuscular once  phenylephrine    Infusion 0.2 MICROgram(s)/kG/Min (4.76 mL/Hr) IV Continuous <Continuous>  sodium chloride 0.9% Bolus 500 milliLiter(s) IV Bolus once  sodium chloride 0.9% Bolus 250 milliLiter(s) IV Bolus once  sodium chloride 0.9%. 1000 milliLiter(s) (75 mL/Hr) IV Continuous <Continuous>    MEDICATIONS  (PRN):  acetaminophen   Tablet .. 650 milliGRAM(s) Oral every 6 hours PRN Temp greater or equal to 38C (100.4F), Mild Pain (1 - 3)      06 Feb 2021 07:01  -  07 Feb 2021 07:00  --------------------------------------------------------  IN:    Heparin: 170 mL    Oral Fluid: 460 mL    Phenylephrine: 170.6 mL    sodium chloride 0.9%: 825 mL  Total IN: 1625.6 mL    OUT:    Voided (mL): 1350 mL  Total OUT: 1350 mL    Total NET: 275.6 mL        I  05 Feb 2021 07:01  -  06 Feb 2021 07:00  --------------------------------------------------------  Total NET: 470 mL      06 Feb 2021 07:01  -  06 Feb 2021 13:28  --------------------------------------------------------    Total NET: 10 mL    LABS:  PTT - ( 07 Feb 2021 06:30 )  PTT:113.1 sec                          12.7   10.55 )-----------( 222      ( 07 Feb 2021 03:47 )             37.0       142  |  110<H>  |  11.0  ----------------------------<  103<H>  3.6   |  22.0  |  0.60    Ca    8.4<L>      05 Feb 2021 05:10  Phos  3.2     02-05  Mg     2.0     02-05    TPro  7.6  /  Alb  4.3  /  TBili  0.5  /  DBili  x   /  AST  22  /  ALT  31  /  AlkPhos  54  02-04      STROKE CORE MEASURES:   HGBA1C- 5.1  LDL- 93  TSH- 2.54       IMAGING/DATA: [ X] Reviewed        CT Perfusion w/ Maps w/ IV Cont (02.06.21 @ 12:27) >  INTERPRETATION:  Clinical indication: Code stroke.    Following injection of approximately 90 cc of Omnipaque 350 IV CT perfusion was performed    CBF<30%:0 ml  Tmax>6.0s: 60 mL  Mismatch volume: 60 mL  Mismatch ratio: Infinite.    IMPRESSION: CT perfusion as described above.     CT Angio Neck w/ IV Cont (02.06.21 @ 10:38) >  INTERPRETATION:  Clinical indications: Aphasia.    Multiple axial sections were performed from base of skull to vertex without contrast enhancement. Coronal and sagittal reconstructions were performed as well.    The size and configuration of the ventricles appear unchanged    Old lacunar infarct versus prominent VR space is again seen involving the left lenticular nucleus region    There is no evidence acute hemorrhage mass mass effect in the posterior fossa or supratentorial.    Evaluation of the structures with the appropriate window appears normal    The visualized paranasal sinuses mastoid and middle ear appear clear.    IMPRESSION: No significant change when allowing for differences in technique.    Findings discussed with Dr Elder on 3/6/2021 11:31 AM.  with read back.    The patient was injected with approximately 90 cc of Omnipaque 350 IV and CTA of the neck and Nikolai Weiss were performed. Coronal and sagittal reconstructions were performed as well.    Evaluation of both distal common carotid, proximal internal and external carotid arteries appear normal as well as both vertebral arteries. No significant stenosis is seen.    There is normal flow involving both distal internal carotid arteries. Evaluation of the anterior cerebral, right middle cerebral basilar and posterior cerebral arteries appear normal. Prominent P-comm is seen involving the right side. There is decreased flow related enhancement involving the left M2 and M3 segments which is likely compatible areas of occlusion.    The dural venous sinuses demonstrate normal enhancement.    Evaluation of the soft tissue neck region appears normal    The visualized portion of both lung apices appear clear.    IMPRESSION:   CT angiogram of the neck appears normal.    Decreased flow related enhancement is seen involving the left M2 and M3 segmen            EXAMINATION:  PHYSICAL EXAM:    Constitutional: No Acute Distress     Neurological: Awake, alert oriented to person, place and time, Following Commands, PERRL, EOMI, No Gaze Preference, Face Symmetrical, Mild aphasia , No dysmetria, No ataxia, No nystagmus     Motor exam:          Upper extremity                         Delt     Bicep     Tricep    HG                                                 R         5/5        5/5        5/5       5/5                                               L          5/5        5/5        5/5       5/5          Lower extremity                        HF         KF        KE       DF         PF                                                  R        5/5        5/5        5/5       5/5         5/5                                               L         5/5        5/5       5/5       5/5          5/5                                                 Sensation: [X ] slight decrease RUE/LE [ ] decreased:     Pulmonary: Clear to Auscultation, No rales, No rhonchi, No wheezes     Cardiovascular: S1, S2, Regular rate and rhythm     Gastrointestinal: Soft, Non-tender, Non-distended     Extremities: No calf tenderness        SUMMARY:HPI:  60 year old right hand dominant female with no PMHx transferred from  with aphasia and right arm weakness that started at 920 this am. She states she was feeling in her usual state of health up until this morning when she was unable to speak to her  and became weak with her right arm. CODE Stroke was called at , NIH 2 and she received TPA at 1035. CTA/ CTP revealing thrombus distal to the L MCA Bifurication, 67ml of penumbra to left frontoparietal lobe. Pt transferred to [facility] for possible mechanical thrombectomy. Upon arrival, pt with improved symptoms, NIH 0. Pt does endorse recent sinus infection 2 weeks ago and completing abx course amoxicillin. Pt denies HA, dizziness, paresthesias, visual changes, difficulty with word finding, weakness.     2/4/21- Aphasic ; R sided sensory loss- [facility]- 2    2/6/2121- Aphasic  and  decreased sensation RUE and LE-- [facility]U admission  NIH =1 - aphasia                - Aphasia and RUE sensation deficit improved at augmented BP to > 135 mmHg while on neosynephrine    NIH 0  mrS 0 (04 Feb 2021 15:09)    CLINICAL TRIALS:  Allergies    No Known Allergies    Intolerances        REVIEW OF SYSTEMS: [X ] Unable to Assess due to neurologic exam    Neuro: [ ] Headache [ ] Back pain [ ] Numbness [ ] Weakness [ ] Ataxia [ ] Dizziness [ X] Aphasia [ ] Dysarthria [ ] Visual disturbance  Resp: [ ] Shortness of breath/dyspnea, [ ] Orthopnea [ ] Cough  CV: [ ] Chest pain [ ] Palpitation [ ] Lightheadedness [ ] Syncope  Renal: [ ] Thirst [ ] Edema  GI: [ ] Nausea [ ] Emesis [ ] Abdominal pain [ ] Constipation [ ] Diarrhea  Hem: [ ] Hematemesis [ ] bright red blood per rectum  ID: [ ] Fever [ ] Chills [ ] Dysuria  ENT: [ ] Rhinorrhea    DEVICES:   [ ] Restraints [ ] ET tube [ ] central line [ ] arterial line [ ] gerber [ ] NGT/OGT [ ] EVD [ ] LD [ ] SASHA/HMV [ ] Trach [ ] PEG [ ] Chest Tube     ICU Vital Signs Last 24 Hrs  T(C): 36.9 (07 Feb 2021 04:12), Max: 37.2 (06 Feb 2021 08:45)  T(F): 98.4 (07 Feb 2021 04:12), Max: 98.9 (06 Feb 2021 08:45)  HR: 46 (07 Feb 2021 06:30) (40 - 88)  BP: 132/74 (07 Feb 2021 06:30) (103/87 - 161/106)  BP(mean): 91 (07 Feb 2021 06:30) (82 - 122)  RR: 10 (07 Feb 2021 06:30) (10 - 25)  SpO2: 98% (07 Feb 2021 06:30) (96% - 100%)    POCT Blood Glucose.: 101 mg/dL (04 Feb 2021 14:11)  POCT Blood Glucose.: 92 mg/dL (04 Feb 2021 10:04)    MEDICATIONS  (STANDING):  aspirin  chewable 81 milliGRAM(s) Oral daily  atorvastatin 40 milliGRAM(s) Oral at bedtime  fluticasone propionate 50 MICROgram(s)/spray Nasal Spray 1 Spray(s) Both Nostrils two times a day  heparin  Infusion 1000 Unit(s)/Hr (10 mL/Hr) IV Continuous <Continuous>  influenza   Vaccine 0.5 milliLiter(s) IntraMuscular once  phenylephrine    Infusion 0.2 MICROgram(s)/kG/Min (4.76 mL/Hr) IV Continuous <Continuous>  sodium chloride 0.9% Bolus 500 milliLiter(s) IV Bolus once  sodium chloride 0.9% Bolus 250 milliLiter(s) IV Bolus once  sodium chloride 0.9%. 1000 milliLiter(s) (75 mL/Hr) IV Continuous <Continuous>    MEDICATIONS  (PRN):  acetaminophen   Tablet .. 650 milliGRAM(s) Oral every 6 hours PRN Temp greater or equal to 38C (100.4F), Mild Pain (1 - 3)      06 Feb 2021 07:01  -  07 Feb 2021 07:00  --------------------------------------------------------  IN:    Heparin: 170 mL    Oral Fluid: 460 mL    Phenylephrine: 170.6 mL    sodium chloride 0.9%: 825 mL  Total IN: 1625.6 mL    OUT:    Voided (mL): 1350 mL  Total OUT: 1350 mL    Total NET: 275.6 mL        I  05 Feb 2021 07:01  -  06 Feb 2021 07:00  --------------------------------------------------------  Total NET: 470 mL      06 Feb 2021 07:01  -  06 Feb 2021 13:28  --------------------------------------------------------    Total NET: 10 mL    LABS:  PTT - ( 07 Feb 2021 06:30 )  PTT:113.1 sec                          12.7   10.55 )-----------( 222      ( 07 Feb 2021 03:47 )             37.0       142  |  110<H>  |  11.0  ----------------------------<  103<H>  3.6   |  22.0  |  0.60    Ca    8.4<L>      05 Feb 2021 05:10  Phos  3.2     02-05  Mg     2.0     02-05    TPro  7.6  /  Alb  4.3  /  TBili  0.5  /  DBili  x   /  AST  22  /  ALT  31  /  AlkPhos  54  02-04      STROKE CORE MEASURES:   HGBA1C- 5.1  LDL- 93  TSH- 2.54       IMAGING/DATA: [ X] Reviewed     MR Head No Cont (02.07.21 @ 12:09) >  IMPRESSION: Acute left MCA territory infarcts. Mild left frontal lobe and left parietal lobe subarachnoid hemorrhage.     MR Angio Neck w/wo IV Cont (02.07.21 @ 12:09) >  INTERPRETATION:  Clinical indication: Rule out internal carotid artery dissection.    Following injection of approximately 6 cc of Gadavist IV gadolinium infusion MRA the neck was performed. Approximately 1 cc of contrast was discarded.    Noncontrast axial T1-weighted sequence with fat suppression performed through the soft tissue neck region.    Evaluation of the distal right common carotid, proximal internal and external carotids appear normal.    Evaluation of the distal left common carotid artery appears normal. There is evidence of decrease flow related enhancement seen involving the proximal left internal carotid artery. This seen approximately1.1 cm distal to the bifurcation and is suspicious for an area occlusion. Evaluation of the proximal left external carotid arteries appears normal.    There is evidence of a long segment of narrowing involving the proximal portion of the right vertebral artery. This could be compatible with a dissection given the appearance on the fat-suppressed axial T1-weighted sequence through the soft tissue neck region (series 3 image 11). There is evidence of hypoplastic left vertebral artery identified.    IMPRESSION: Occlusion of the left internal carotid artery seen.    Long segment of narrowing involving a dominant right vertebral artery seen proximally. This could be compatible with underlying dissection.                    CT Perfusion w/ Maps w/ IV Cont (02.06.21 @ 12:27) >  INTERPRETATION:  Clinical indication: Code stroke.    Following injection of approximately 90 cc of Omnipaque 350 IV CT perfusion was performed    CBF<30%:0 ml  Tmax>6.0s: 60 mL  Mismatch volume: 60 mL  Mismatch ratio: Infinite.    IMPRESSION: CT perfusion as described above.     CT Angio Neck w/ IV Cont (02.06.21 @ 10:38) >  INTERPRETATION:  Clinical indications: Aphasia.    Multiple axial sections were performed from base of skull to vertex without contrast enhancement. Coronal and sagittal reconstructions were performed as well.    The size and configuration of the ventricles appear unchanged    Old lacunar infarct versus prominent VR space is again seen involving the left lenticular nucleus region    There is no evidence acute hemorrhage mass mass effect in the posterior fossa or supratentorial.    Evaluation of the structures with the appropriate window appears normal    The visualized paranasal sinuses mastoid and middle ear appear clear.    IMPRESSION: No significant change when allowing for differences in technique.    Findings discussed with Dr Elder on 3/6/2021 11:31 AM.  with read back.    The patient was injected with approximately 90 cc of Omnipaque 350 IV and CTA of the neck and [place] Weiss were performed. Coronal and sagittal reconstructions were performed as well.    Evaluation of both distal common carotid, proximal internal and external carotid arteries appear normal as well as both vertebral arteries. No significant stenosis is seen.    There is normal flow involving both distal internal carotid arteries. Evaluation of the anterior cerebral, right middle cerebral basilar and posterior cerebral arteries appear normal. Prominent P-comm is seen involving the right side. There is decreased flow related enhancement involving the left M2 and M3 segments which is likely compatible areas of occlusion.    The dural venous sinuses demonstrate normal enhancement.    Evaluation of the soft tissue neck region appears normal    The visualized portion of both lung apices appear clear.    IMPRESSION:   CT angiogram of the neck appears normal.    Decreased flow related enhancement is seen involving the left M2 and M3 segmen            EXAMINATION:  PHYSICAL EXAM:    Constitutional: No Acute Distress     Neurological: Awake, alert oriented to person, place and time, Following Commands, PERRL, EOMI, No Gaze Preference, Face Symmetrical, Mild aphasia , No dysmetria, No ataxia, No nystagmus     Motor exam:          Upper extremity                         Delt     Bicep     Tricep    HG                                                 R         5/5        5/5        5/5       5/5                                               L          5/5        5/5        5/5       5/5          Lower extremity                        HF         KF        KE       DF         PF                                                  R        5/5        5/5        5/5       5/5         5/5                                               L         5/5        5/5       5/5       5/5          5/5                                                 Sensation: [X ] slight decrease RUE/LE [ ] decreased:     Pulmonary: Clear to Auscultation, No rales, No rhonchi, No wheezes     Cardiovascular: S1, S2, Regular rate and rhythm     Gastrointestinal: Soft, Non-tender, Non-distended     Extremities: No calf tenderness

## 2021-02-07 NOTE — PROGRESS NOTE ADULT - ASSESSMENT
ASSESSMENT/PLAN:  60 year old female with no PMH presents to [facility] with stroke like symptoms s/p TPA at 1035. CTA/CTP with thrombus distal to L MCA bifurication. Symptoms resolved s/p TPA.    2nd event NIH 1. mRS 0. - 2/6/21    PLAN:  Neuro:  -Admit to neuro ICU-  q 1 hr neuro checks   - Dago-Synephrine drip-  HTN augmentation - improved with SBP > 135mmHg  - S/P 500cc  Bolus  - Re-eval by PT/OT- communication board  - ASA q day  - Heparin drip - monitor PTT- goal 50-70 sec  -s/p TPA 2/4 1  - MRI with fat sats- report suggestive of dissection - recurrent event on ASA with likely thrombus associated with dissection - start heparin 1000u/hr - goal 50-70  - Suggest angio to define extent of occlusion/ dissection  - dysphagia screen- passed  -PT/OT/ Speech      CV:   --180  -ECHO:   1. Technically good study.   2. Normal global left ventricular systolic function.   3. Left ventricular ejection fraction, by visual estimation, is 60 to 65%.   4. Normal left atrial size.   5. Normal right atrial size.   6. Mild mitral annular calcification.   7. Mild mitral valve regurgitation.   8. There is no evidence of pericardial effusion.  - LDL - goal 70 - atorvastatin 20mg  qday     Respiratory:  -RA  -SPO2 >92  -Incentive Spirometry    GI:   dysphagia screen   Stool softeners   No need for PPI/H2 Blockers    :  No gerber   Cr stable     Heme:  -s/p TPA  -SCDS/  On therapeutic heparin for recurrent stroke symptoms as above    Endo:  pending A1C- 5.1  TSH- Nl      ID: Chronic sinusitis   Flonase  q day  Afebrile    SOCIAL/FAMILY:  [X] updated at bedside    CODE STATUS:  [X] Full Code    DISPOSITION:  [X] ICU     [X] Patient is  critically ill and monitor for recurrent stroke     Time spent: 40 critical care minutes ASSESSMENT/PLAN:  60 year old female with no PMH presents to [facility] with stroke like symptoms s/p TPA at 1035. CTA/CTP with thrombus distal to L MCA bifurication. Symptoms resolved s/p TPA.    2nd event NIH 1. mRS 0. - 2/6/21    PLAN:  Neuro:  -Admit to neuro ICU-  q 1 hr neuro checks   - Dago-Synephrine drip-  HTN augmentation - improved with SBP > 135mmHg  - Wean dago and start midodrine 10mg q 8 - titrate dose to goal BP   - S/P 500cc  Bolus  - Re-eval by PT/OT- communication board  - ASA q day  - Heparin drip - monitor PTT- goal 60-80 sec-   -s/p TPA 2/4 1  - MRI with fat sats- report suggestive of dissection - recurrent event on ASA with likely thrombus associated with dissection - start heparin 1000u/hr - goal 50-70  - Suggest angio to define extent of occlusion/ dissection  - dysphagia screen- passed  -PT/OT/ Speech      CV:   --180  -ECHO:   1. Technically good study.   2. Normal global left ventricular systolic function.   3. Left ventricular ejection fraction, by visual estimation, is 60 to 65%.   4. Normal left atrial size.   5. Normal right atrial size.   6. Mild mitral annular calcification.   7. Mild mitral valve regurgitation.   8. There is no evidence of pericardial effusion.  - LDL - goal 70 - atorvastatin 20mg  qday     Respiratory:  -RA  -SPO2 >92  -Incentive Spirometry    GI:   dysphagia screen   Stool softeners   No need for PPI/H2 Blockers    :  No gerber   Cr stable     Heme:  -s/p TPA  Goal PTT 60-80 sec while on heparin  -SCDS/  On therapeutic heparin for recurrent stroke symptoms as above    Endo:  pending A1C- 5.1  TSH- Nl      ID: Chronic sinusitis   Flonase  q day  Afebrile    SOCIAL/FAMILY:  [X] updated at bedside    CODE STATUS:  [X] Full Code    DISPOSITION:  [X] ICU     [X] Patient is  critically ill and monitor for recurrent stroke     Time spent: 40 critical care minutes ASSESSMENT/PLAN:  60 year old female with no PMH presents to [facility] with stroke like symptoms s/p TPA at 1035. CTA/CTP with thrombus distal to L MCA bifurication. Symptoms resolved s/p TPA.    2nd event NIH 1. mRS 0. - 2/6/21  New L MCA stroke / ? L frontal L parietal SAH    PLAN:  Neuro:  -Admit to neuro ICU-  q 1 hr neuro checks   - Will repeat CT of brain if min or stable small SAH will cautiously restart Heparin drip - Benefits >> Risk restarting heparin  - Dago-Synephrine drip-  HTN augmentation - improved with SBP > 135mmHg  - Wean dago and start midodrine 10mg q 8 - titrate dose to goal BP   - S/P 500cc  Bolus  - Re-eval by PT/OT- communication board  - ASA q day  - Heparin drip - monitor PTT- goal 60-80 sec-   -s/p TPA 2/4 1  - MRI with fat sats- as above ;tae R veret dissection with radiology-  - dysphagia screen- passed  - PT/OT/ Speech      CV:   --< 150  -ECHO:   1. Technically good study.   2. Normal global left ventricular systolic function.   3. Left ventricular ejection fraction, by visual estimation, is 60 to 65%.   4. Normal left atrial size.   5. Normal right atrial size.   6. Mild mitral annular calcification.   7. Mild mitral valve regurgitation.   8. There is no evidence of pericardial effusion.  - LDL - goal 70 - atorvastatin 20mg  qday     Respiratory:  -RA  -SPO2 >92  -Incentive Spirometry    GI:   dysphagia screen   Stool softeners   No need for PPI/H2 Blockers    :  No gerber   Cr stable     Heme:  -s/p TPA  Goal PTT 60-80 sec while on heparin  -SCDS/  On therapeutic heparin for recurrent stroke symptoms as above    Endo:  pending A1C- 5.1  TSH- Nl      ID: Chronic sinusitis   Flonase  q day  Afebrile    SOCIAL/FAMILY:  [X] updated at bedside    CODE STATUS:  [X] Full Code    DISPOSITION:  [X] ICU     [X] Patient is  critically ill and monitor for recurrent stroke     Time spent: 40 critical care minutes

## 2021-02-07 NOTE — DIETITIAN INITIAL EVALUATION ADULT. - OTHER INFO
60 year old right hand dominant female with no PMHx transferred from  with aphasia and right arm weakness that started at 920 this am. She states she was feeling in her usual state of health up until this morning when she was unable to speak to her  and became weak with her right arm. CODE Stroke was called at , NIH 2 and she received TPA at 1035. CTA/ CTP revealing thrombus distal to the L MCA Bifurcation, 67ml of penumbra to left frontoparietal lobe. Pt reports no eating difficulties. Pt did have some word finding issues during interview. No weight loss hx noted. Reviewed Dash diet with pt and enforced guidelines.

## 2021-02-07 NOTE — PROGRESS NOTE ADULT - SUBJECTIVE AND OBJECTIVE BOX
[facility]                                        Neurology at [place]                                 Tomasa Recinos, & Noé                                  [address]                                        [place], [place], [address]                                             [phone]        CC: Stroke    HPI:   The patient is a 60y Female who presented as transfer from [facility] for stroke status post alteplase.  At 0920 she became acutely aphasic with right face and arm weakness/funny feeling.  She had NIH SS score of 2. She had head CT that did not show blood or large stroke and was given alteplase at 1035, CT-A head showed distal left MCA thrombus and CTP showed 67 cc penumbra. She was ultimately transferred to [facility] for further care.  On 2/6 she developed recurrence of expressive aphasia. Predominantly with word finding difficulty.   Vascular team present and code stroke was called.   She was transferred back to neuro-ICU.     Interim history:  In ICU.   Speech somewhat improved.     ROS:   Denies headache or dizziness.  Denies chest pain.  Denies shortness of breath.    MEDICATIONS  (STANDING):  aspirin  chewable 81 milliGRAM(s) Oral daily  atorvastatin 40 milliGRAM(s) Oral at bedtime  fluticasone propionate 50 MICROgram(s)/spray Nasal Spray 1 Spray(s) Both Nostrils two times a day  influenza   Vaccine 0.5 milliLiter(s) IntraMuscular once  midodrine 10 milliGRAM(s) Oral every 8 hours  phenylephrine    Infusion 0.2 MICROgram(s)/kG/Min (4.76 mL/Hr) IV Continuous <Continuous>  sodium chloride 0.9% Bolus 500 milliLiter(s) IV Bolus once  sodium chloride 0.9%. 1000 milliLiter(s) (75 mL/Hr) IV Continuous <Continuous>      Vital Signs Last 24 Hrs  T(C): 36.6 (07 Feb 2021 08:00), Max: 37.1 (06 Feb 2021 19:32)  T(F): 97.9 (07 Feb 2021 08:00), Max: 98.8 (06 Feb 2021 19:32)  HR: 56 (07 Feb 2021 10:00) (40 - 79)  BP: 115/63 (07 Feb 2021 10:00) (103/87 - 161/106)  BP(mean): 80 (07 Feb 2021 10:00) (80 - 122)  RR: 14 (07 Feb 2021 10:00) (10 - 25)  SpO2: 100% (07 Feb 2021 10:00) (96% - 100%)    Detailed Neurologic Exam:    Mental status: The patient is awake and alert. There is some expressive aphasia. She follows instructions well.     Cranial nerves: Pupils equal and react symmetrically to light. There is no visual field deficit to threat. Extraocular motion is full with no nystagmus. There is no ptosis. Facial sensation is intact. Facial musculature is symmetric. Palate elevates symmetrically. Tongue is midline.    Motor: There is normal bulk and tone.  There is no tremor.  Strength grossly 5/5 bilaterally.    Sensation: Grossly intact to light touch and pin.    Reflexes: 2+ throughout and plantar responses are flexor.    Cerebellar: No dysmetria on finger nose testing.    Labs:     02-07    136  |  108<H>  |  10.0  ----------------------------<  98  4.4   |  18.0<L>  |  0.50    Ca    8.6      07 Feb 2021 03:47  Phos  4.0     02-07  Mg     2.0     02-07    TPro  5.7<L>  /  Alb  3.6  /  TBili  0.4  /  DBili  x   /  AST  14  /  ALT  13  /  AlkPhos  42  02-06                            12.7   10.55 )-----------( 222      ( 07 Feb 2021 03:47 )             37.0       Rad:   Repeat MRI images reviewed. There are new foci of diffusion restriction in the left parietal lobe and left insular ribbon. There is a question of mild subarachnoid hemorrhage in the left frontal and parietal images.

## 2021-02-07 NOTE — PROGRESS NOTE ADULT - SUBJECTIVE AND OBJECTIVE BOX
HPI:  60 year old right hand dominant female with no PMHx transferred from  with aphasia and right arm weakness that started at 920 this am. She states she was feeling in her usual state of health up until this morning when she was unable to speak to her  and became weak with her right arm. CODE Stroke was called at , NIH 2 and she received TPA at 1035. CTA/ CTP revealing thrombus distal to the L MCA Bifurication, 67ml of penumbra to left frontoparietal lobe. Pt transferred to [facility] for possible mechanical thrombectomy. Upon arrival, pt with improved symptoms, NIH 0. Pt does endorse recent sinus infection 2 weeks ago and completing abx course amoxicillin. Pt denies HA, dizziness, paresthesias, visual changes, difficulty with word finding, weakness.     NIH 0  mrS 0 (04 Feb 2021 15:09)      INTERVAL HPI/OVERNIGHT EVENTS:  60y Female upgraded back to ICU yesterday after development of aphasia and decreased sensation to right side. Patient was placed on IV heparin gtt and BP increased with pressors to . Pt had improvement in exam. MRI with acute L MCA infarcts as well as small SAH. MR Angio neck revealing occluded LICA as well as long segment of narrowing within right vertebral artery, possible dissection. Heparin gtt was stopped initially for repeat CT scan. CT scan 4 hours later appears stable no blood. Plan discussed with Dr. Thomas and Devaughn Ramirez will continue to anticoagulate patient goal PTT 60-80.      Vital Signs Last 24 Hrs  T(C): 37.4 (07 Feb 2021 15:30), Max: 37.4 (07 Feb 2021 15:30)  T(F): 99.4 (07 Feb 2021 15:30), Max: 99.4 (07 Feb 2021 15:30)  HR: 49 (07 Feb 2021 13:45) (40 - 70)  BP: 164/77 (07 Feb 2021 13:45) (109/68 - 164/81)  BP(mean): 104 (07 Feb 2021 13:45) (80 - 122)  RR: 21 (07 Feb 2021 13:45) (10 - 25)  SpO2: 100% (07 Feb 2021 13:45) (96% - 100%)    PHYSICAL EXAM:  GENERAL: NAD, well-groomed, well-developed  HEAD:  Atraumatic, normocephalic  DRAINS:   АЛЕКСАНДР COMA SCORE: E- V- M- =15       E: 4= opens eyes spontaneously        V: 5= oriented        M: 6= follows commands  MENTAL STATUS: AAO x3; Awake; Opens eyes spontaneously; mild aphasia- much improved; following simple commands  CRANIAL NERVES: Visual acuity normal for age, visual fields full to confrontation, PERRL. EOMI without nystagmus. Facial sensation intact V1-3 distribution b/l. Face symmetric w/ normal eye closure and smile, tongue midline. Hearing grossly intact. Speech clear. Head turning and shoulder shrug intact.   REFLEXES: PERRL.   MOTOR: strength 5/5 b/l upper and lower extremities  SENSATION: grossly intact to light touch all extremities  COORDINATION: Gait intact; rapid alternating movements intact; heel to shin intact; no upper extremity dysmetria    LABS:                        12.7   10.55 )-----------( 222      ( 07 Feb 2021 03:47 )             37.0     02-07    136  |  108<H>  |  10.0  ----------------------------<  98  4.4   |  18.0<L>  |  0.50    Ca    8.6      07 Feb 2021 03:47  Phos  4.0     02-07  Mg     2.0     02-07    TPro  5.7<L>  /  Alb  3.6  /  TBili  0.4  /  DBili  x   /  AST  14  /  ALT  13  /  AlkPhos  42  02-06    PTT - ( 07 Feb 2021 11:11 )  PTT:174.3 sec      02-06 @ 07:01 - 02-07 @ 07:00  --------------------------------------------------------  IN: 1922.2 mL / OUT: 1350 mL / NET: 572.2 mL    02-07 @ 07:01  - 02-07 @ 17:05  --------------------------------------------------------  IN: 871.4 mL / OUT: 1350 mL / NET: -478.6 mL        RADIOLOGY & ADDITIONAL TESTS:  < from: MR Head No Cont (02.07.21 @ 12:09) >     EXAM:  MR BRAIN                          PROCEDURE DATE:  02/07/2021          INTERPRETATION:  CLINICAL INDICATIONS: stroke    COMPARISON: MRI brain dated 2/5/2021. CT head dated 2/5/2021    TECHNIQUE: MRI brain: Multiplanar, multisequence MR imaging of the brain are obtained without the administration of intravenous Gadavist contrast.    FINDINGS:  There is are new foci of abnormal restricted diffusion in the left parietal lobe and posterior left insular ribbon on images 15 through 21 of series 4. Possible mild subarachnoid hemorrhage in the left frontal lobe and left parietal lobe on images 21 and 18 of series 6.    Prominent perivascular space in left basal ganglia normal signal is demonstrated throughout the brain parenchyma. Normal [id number] are seen within  the intracranial vasculature. The lateral ventricles and cortical sulci are otherwise age-appropriate in size and configuration. There is no mass, mass effect, or extra-axial fluid collection. Midline structures are normal.  The visualized paranasal sinuses, mastoid air cells and orbits are unremarkable.      IMPRESSION: Acute left MCA territory infarcts. Mild left frontal lobe and left parietal lobe subarachnoid hemorrhage. Follow-up head CT is recommended. Findings discussed with Dr. Thomas.    < end of copied text >  < from: MR Angio Neck w/wo IV Cont (02.07.21 @ 12:09) >   EXAM:  MR ANGIO NECK WAW IC                          PROCEDURE DATE:  02/07/2021          INTERPRETATION:  Clinical indication: Rule out internal carotid artery dissection.    Following injection of approximately 6 cc of Gadavist IV gadolinium infusion MRA the neck was performed. Approximately 1 cc of contrast was discarded.    Noncontrast axial T1-weighted sequence with fat suppression performed through the soft tissue neck region.    Evaluation of the distal right common carotid, proximal internal and external carotids appear normal.    Evaluation of the distal left common carotid artery appears normal. There is evidence of decrease flow related enhancement seen involving the proximal left internal carotid artery. This seen approximately1.1 cm distal to the bifurcation and is suspicious for an area occlusion. Evaluation of the proximal left external carotid arteries appears normal.    There is evidence of a long segment of narrowing involving the proximal portion of the right vertebral artery. This could be compatible with a dissection given the appearance on the fat-suppressed axial T1-weighted sequence through the soft tissue neck region (series 3 image 11). There is evidence of hypoplastic left vertebral artery identified.    IMPRESSION: Occlusion of the left internal carotid artery seen.    Long segment of narrowing involving a dominant right vertebral artery seen proximally. This could be compatible with underlying dissection.    Findings discussed with CONG Alejandro on February 7, 2020 9/20/2021 1:19 p.m.  with read back.    < end of copied text >          CAPRINI SCORE [CLOT]:  Patient has an estimated Caprini score of greater than 5.  However, the patient's unique clinical situation will be addressed in an individual manner to determine appropriate anticoagulation treatment, if any.

## 2021-02-07 NOTE — PROGRESS NOTE ADULT - ASSESSMENT
ASSESSMENT:   60 year old female with no PMH presents to [facility] with stroke like symptoms s/p TPA at 1035. CTA/CTP with thrombus distal to L MCA bifurication. Symptoms resolved s/p TPA.    2nd event occured yesterday. NIH 1. mRS 0. - 2/6/21. Heparin gtt initiated. BP pressed to 140-160.   Today MRi with New L MCA stroke / ? L frontal L parietal SAH on MRI.       NIH 0  --> 1 on 2/6/21 at 10:30 am  mrS 0 (04 Feb 2021 15:09)    PLAN:  -Continue neuro checks  -  Maintain SBP within desired range 120-160  Continue antiplatelet / anticoagulation: aspirin.  Start Heparin Drip after repeat neuro-imaging if no contraindication.  The patient does not require urgent neurointerventional procedures for the treatment of stroke at this time as discussed with Dr. Cantor, the patient will be started on heparin drip   We will continue to follow the patient's clinical course  Case discussed with Dr. Thomas due to recent downgrade from the ICU, he wishes the patient to be monitored in the neuro ICU in the short term  ASSESSMENT:   60 year old female with no PMH presents to [facility] with stroke like symptoms s/p TPA at 1035. CTA/CTP with thrombus distal to L MCA bifurication. Symptoms resolved s/p TPA.    2nd event occured yesterday. NIH 1. mRS 0. - 2/6/21. Heparin gtt initiated. BP pressed to 140-160.   Today MRi with New L MCA stroke / ? L frontal L parietal SAH on MRI.   Heparin gtt stopped. REpeat CTH with no bleed. Plan discussed with Dr. Cantor and Dr. Thomas. Heparin gtt resumed. PTT goal 60-80      NIH 0  --> 1 on 2/6/21 at 10:30 am  mrS 0 (04 Feb 2021 15:09)    PLAN:  -D/W Dr. Cantor  -Continue neuro checks  -Maintain SBP within desired range 130-150 with phenylephrine gtt and MIdodrine.   -Continue antiplatelet / anticoagulation: aspirin.  Cautiously restart heparin gtt if minimial or stable evidence of SAH   The patient does not require urgent neurointerventional procedures for the treatment of stroke at this time as discussed with Dr. Cantor, the patient will be started on heparin drip   We will continue to follow the patient's clinical course  Case discussed with Dr. Thomas due to recent downgrade from the ICU, he wishes the patient to be monitored in the neuro ICU in the short term  ASSESSMENT:   60 year old female with no PMH presents to [facility] with stroke like symptoms s/p TPA at 1035. CTA/CTP with thrombus distal to L MCA bifurication. Symptoms resolved s/p TPA.    2nd event occured yesterday. NIH 1. mRS 0. - 2/6/21. Heparin gtt initiated. BP pressed to 140-160 with improvement of symptoms.   Today MRi with New L MCA stroke / ? L frontal L parietal SAH on MRI. MRA with fat sat concerning for right vert dissection  Heparin gtt stopped. CTH stable, no bleed. Plan discussed with Dr. Cantor and Dr. Thomas. Heparin gtt resumed. PTT goal 60-80      NIH 0  --> 1 on 2/6/21 at 10:30 am  mrS 0 (04 Feb 2021 15:09)    PLAN:  -D/W Dr. Cantor  -Continue neuro checks hourly  -Maintain SBP within desired range 130-150. Patient with improved exam with higher BP.  Midodrine started. wean phenylephrine as tolerated  -Continue antiplatelet aspirin / anticoagulation.  Cautiously restart heparin gtt if minimial or stable evidence of SAH   -The patient does not require urgent neurointerventional procedures  -Cannot rule out cryptogenic stroke, atherosclerotic disease not evident on CTH. Patient will likely require MICHELE/ ILR. Cards consulted  -We will continue to follow the patient's clinical course   ASSESSMENT:   60 year old female with no PMH presents to [facility] with stroke like symptoms s/p TPA at 1035. CTA/CTP with thrombus distal to L MCA bifurication. Symptoms resolved s/p TPA.    2nd event occured yesterday. NIH 1. mRS 0. - 2/6/21. Heparin gtt initiated. BP pressed to 140-160 with improvement of symptoms.   Today MRi with New L MCA stroke / ? L frontal L parietal SAH on MRI. MRA with fat sat concerning for right vert dissection  Heparin gtt stopped. CTH stable, no bleed. Plan discussed with Dr. Cantor and Dr. Thomas. Heparin gtt resumed. PTT goal 60-80      NIH 0  --> 1 on 2/6/21 at 10:30 am  mrS 0 (04 Feb 2021 15:09)    PLAN:  -D/W Dr. Cantor  -Continue neuro checks hourly  -Maintain SBP within desired range 130-150. Patient with improved exam with higher BP.  Midodrine started. wean phenylephrine as tolerated  -Continue antiplatelet aspirin / anticoagulation.  Cautiously restart heparin gtt if minimial or stable evidence of SAH   -The patient does not require urgent neurointerventional procedures, will sign off. Please Reconsult PRN.Thanks!  -Cannot rule out cryptogenic stroke, atherosclerotic disease not evident on CTH. Patient will likely require MICHELE/ ILR. Cards consulted  -We will continue to follow the patient's clinical course

## 2021-02-07 NOTE — DIETITIAN INITIAL EVALUATION ADULT. - ORAL INTAKE PTA/DIET HISTORY
Pt stated usually eats well PTA, eats organic, healthy foods but does admit to eating salty foods recently. Pt requesting no pasta and sauce.

## 2021-02-07 NOTE — DIETITIAN INITIAL EVALUATION ADULT. - PERTINENT LABORATORY DATA
02-07 Na136 mmol/L Glu 98 mg/dL K+ 4.4 mmol/L Cr  0.50 mg/dL BUN 10.0 mg/dL Phos 4.0 mg/dL Alb n/a   PAB n/a

## 2021-02-08 DIAGNOSIS — I63.9 CEREBRAL INFARCTION, UNSPECIFIED: ICD-10-CM

## 2021-02-08 LAB
ANION GAP SERPL CALC-SCNC: 11 MMOL/L — SIGNIFICANT CHANGE UP (ref 5–17)
APTT BLD: 154.8 SEC — CRITICAL HIGH (ref 27.5–35.5)
APTT BLD: 56.6 SEC — HIGH (ref 27.5–35.5)
APTT BLD: 69.4 SEC — HIGH (ref 27.5–35.5)
APTT BLD: 90.1 SEC — HIGH (ref 27.5–35.5)
BUN SERPL-MCNC: 8 MG/DL — SIGNIFICANT CHANGE UP (ref 8–20)
CALCIUM SERPL-MCNC: 9.1 MG/DL — SIGNIFICANT CHANGE UP (ref 8.6–10.2)
CHLORIDE SERPL-SCNC: 106 MMOL/L — SIGNIFICANT CHANGE UP (ref 98–107)
CO2 SERPL-SCNC: 24 MMOL/L — SIGNIFICANT CHANGE UP (ref 22–29)
CREAT SERPL-MCNC: 0.55 MG/DL — SIGNIFICANT CHANGE UP (ref 0.5–1.3)
GLUCOSE BLDC GLUCOMTR-MCNC: 98 MG/DL — SIGNIFICANT CHANGE UP (ref 70–99)
GLUCOSE SERPL-MCNC: 96 MG/DL — SIGNIFICANT CHANGE UP (ref 70–99)
HCT VFR BLD CALC: 35.6 % — SIGNIFICANT CHANGE UP (ref 34.5–45)
HGB BLD-MCNC: 12.3 G/DL — SIGNIFICANT CHANGE UP (ref 11.5–15.5)
MAGNESIUM SERPL-MCNC: 1.9 MG/DL — SIGNIFICANT CHANGE UP (ref 1.6–2.6)
MCHC RBC-ENTMCNC: 31.6 PG — SIGNIFICANT CHANGE UP (ref 27–34)
MCHC RBC-ENTMCNC: 34.6 GM/DL — SIGNIFICANT CHANGE UP (ref 32–36)
MCV RBC AUTO: 91.5 FL — SIGNIFICANT CHANGE UP (ref 80–100)
PHOSPHATE SERPL-MCNC: 3.8 MG/DL — SIGNIFICANT CHANGE UP (ref 2.4–4.7)
PLATELET # BLD AUTO: 233 K/UL — SIGNIFICANT CHANGE UP (ref 150–400)
POTASSIUM SERPL-MCNC: 3.6 MMOL/L — SIGNIFICANT CHANGE UP (ref 3.5–5.3)
POTASSIUM SERPL-SCNC: 3.6 MMOL/L — SIGNIFICANT CHANGE UP (ref 3.5–5.3)
RBC # BLD: 3.89 M/UL — SIGNIFICANT CHANGE UP (ref 3.8–5.2)
RBC # FLD: 11.6 % — SIGNIFICANT CHANGE UP (ref 10.3–14.5)
SARS-COV-2 IGG SERPL QL IA: NEGATIVE — SIGNIFICANT CHANGE UP
SARS-COV-2 IGM SERPL IA-ACNC: 0.07 INDEX — SIGNIFICANT CHANGE UP
SODIUM SERPL-SCNC: 141 MMOL/L — SIGNIFICANT CHANGE UP (ref 135–145)
WBC # BLD: 9.28 K/UL — SIGNIFICANT CHANGE UP (ref 3.8–10.5)
WBC # FLD AUTO: 9.28 K/UL — SIGNIFICANT CHANGE UP (ref 3.8–10.5)

## 2021-02-08 PROCEDURE — 93010 ELECTROCARDIOGRAM REPORT: CPT

## 2021-02-08 PROCEDURE — 99233 SBSQ HOSP IP/OBS HIGH 50: CPT | Mod: 25

## 2021-02-08 PROCEDURE — 93325 DOPPLER ECHO COLOR FLOW MAPG: CPT | Mod: 26

## 2021-02-08 PROCEDURE — 93312 ECHO TRANSESOPHAGEAL: CPT | Mod: 26

## 2021-02-08 PROCEDURE — 93320 DOPPLER ECHO COMPLETE: CPT | Mod: 26

## 2021-02-08 PROCEDURE — 99291 CRITICAL CARE FIRST HOUR: CPT

## 2021-02-08 PROCEDURE — 99233 SBSQ HOSP IP/OBS HIGH 50: CPT

## 2021-02-08 RX ORDER — MIDODRINE HYDROCHLORIDE 2.5 MG/1
15 TABLET ORAL EVERY 8 HOURS
Refills: 0 | Status: DISCONTINUED | OUTPATIENT
Start: 2021-02-08 | End: 2021-02-11

## 2021-02-08 RX ORDER — APIXABAN 2.5 MG/1
5 TABLET, FILM COATED ORAL
Refills: 0 | Status: DISCONTINUED | OUTPATIENT
Start: 2021-02-08 | End: 2021-02-12

## 2021-02-08 RX ORDER — ALPRAZOLAM 0.25 MG
0.25 TABLET ORAL ONCE
Refills: 0 | Status: DISCONTINUED | OUTPATIENT
Start: 2021-02-08 | End: 2021-02-08

## 2021-02-08 RX ORDER — POTASSIUM CHLORIDE 20 MEQ
40 PACKET (EA) ORAL ONCE
Refills: 0 | Status: COMPLETED | OUTPATIENT
Start: 2021-02-08 | End: 2021-02-08

## 2021-02-08 RX ORDER — NOREPINEPHRINE BITARTRATE/D5W 8 MG/250ML
0.05 PLASTIC BAG, INJECTION (ML) INTRAVENOUS
Qty: 8 | Refills: 0 | Status: DISCONTINUED | OUTPATIENT
Start: 2021-02-08 | End: 2021-02-09

## 2021-02-08 RX ADMIN — Medication 5.95 MICROGRAM(S)/KG/MIN: at 11:51

## 2021-02-08 RX ADMIN — Medication 650 MILLIGRAM(S): at 11:50

## 2021-02-08 RX ADMIN — Medication 40 MILLIEQUIVALENT(S): at 11:50

## 2021-02-08 RX ADMIN — MIDODRINE HYDROCHLORIDE 15 MILLIGRAM(S): 2.5 TABLET ORAL at 21:40

## 2021-02-08 RX ADMIN — APIXABAN 5 MILLIGRAM(S): 2.5 TABLET, FILM COATED ORAL at 21:35

## 2021-02-08 RX ADMIN — METHOCARBAMOL 500 MILLIGRAM(S): 500 TABLET, FILM COATED ORAL at 23:00

## 2021-02-08 RX ADMIN — Medication 650 MILLIGRAM(S): at 21:37

## 2021-02-08 RX ADMIN — MIDODRINE HYDROCHLORIDE 10 MILLIGRAM(S): 2.5 TABLET ORAL at 13:30

## 2021-02-08 RX ADMIN — PHENYLEPHRINE HYDROCHLORIDE 4.76 MICROGRAM(S)/KG/MIN: 10 INJECTION INTRAVENOUS at 05:52

## 2021-02-08 RX ADMIN — HEPARIN SODIUM 8.25 UNIT(S)/HR: 5000 INJECTION INTRAVENOUS; SUBCUTANEOUS at 05:53

## 2021-02-08 RX ADMIN — SODIUM CHLORIDE 75 MILLILITER(S): 9 INJECTION INTRAMUSCULAR; INTRAVENOUS; SUBCUTANEOUS at 11:51

## 2021-02-08 RX ADMIN — LIDOCAINE 1 PATCH: 4 CREAM TOPICAL at 23:01

## 2021-02-08 RX ADMIN — LIDOCAINE 1 PATCH: 4 CREAM TOPICAL at 05:34

## 2021-02-08 RX ADMIN — LIDOCAINE 1 PATCH: 4 CREAM TOPICAL at 11:51

## 2021-02-08 RX ADMIN — MIDODRINE HYDROCHLORIDE 10 MILLIGRAM(S): 2.5 TABLET ORAL at 05:52

## 2021-02-08 RX ADMIN — Medication 0.25 MILLIGRAM(S): at 12:14

## 2021-02-08 RX ADMIN — ATORVASTATIN CALCIUM 40 MILLIGRAM(S): 80 TABLET, FILM COATED ORAL at 21:35

## 2021-02-08 RX ADMIN — Medication 81 MILLIGRAM(S): at 11:51

## 2021-02-08 NOTE — PHYSICAL THERAPY INITIAL EVALUATION ADULT - GAIT DEVIATIONS NOTED, PT EVAL
reciprocal gait pattern with no obvious gait deviations
decreased shilpa/decreased velocity of limb motion/decreased step length

## 2021-02-08 NOTE — OCCUPATIONAL THERAPY INITIAL EVALUATION ADULT - SENSORY TESTS
pt denies any recent changes in sensation
pt denies any recent changes in sensation; pt with +capillary refill in bilateral toes

## 2021-02-08 NOTE — OCCUPATIONAL THERAPY INITIAL EVALUATION ADULT - PERTINENT HX OF CURRENT PROBLEM, REHAB EVAL
Pt presented as a transfer from [facility] with a left MCA stroke. Pt presented to [facility] with aphasia and drooling and received tPA. Pt was transferred to [facility] for neuro care. Pt downgraded to medical floor and then was a rapid response on 2/6/21 due to increased expressive aphasia. Head MRI with acute left MCA territory infarcts; mild left frontal lobe and left parietal lobe subarachnoid hemorrhage.
Pt presented as a transfer from [facility] with a left MCA stroke. Pt presented to [facility] with aphasia and drooling and received tPA. Pt was transferred to [facility] for neuro care.

## 2021-02-08 NOTE — OCCUPATIONAL THERAPY INITIAL EVALUATION ADULT - ADDITIONAL COMMENTS
Pt lives in house with 1 GAVIN and 12 steps inside up to bedroom and bathroom. Bathroom has bathtub with curtains. Pt does not own any DME. Pt is right handed. Pt drives. Pt's  is able and available to assist upon discharge.
Pt lives in house with 1 GAVIN and 12 steps inside up to bedroom and bathroom. Bathroom has bathtub with curtains. Pt does not own any DME. Pt is right handed. Pt drives. Pt's  is able and available to assist upon discharge.

## 2021-02-08 NOTE — OCCUPATIONAL THERAPY INITIAL EVALUATION ADULT - REFERRAL TO ANOTHER SERVICE NEEDED, OT EVAL
physical therapy/social work/speech language pathology
physical therapy/social work/speech language pathology

## 2021-02-08 NOTE — OCCUPATIONAL THERAPY INITIAL EVALUATION ADULT - ANTICIPATED DISCHARGE DISPOSITION, OT EVAL
home, no skilled OT services required at this time; SLP recommended
home with outpatient PT and SLP services

## 2021-02-08 NOTE — OCCUPATIONAL THERAPY INITIAL EVALUATION ADULT - GENERAL OBSERVATIONS, REHAB EVAL
Received pt seated in bedside chair, +IV lock, +telemetry. Pt agrees to OT.
Received pt in semi-rosa position in bed, +IVs, +telemetry. Pt agrees to OT.

## 2021-02-08 NOTE — OCCUPATIONAL THERAPY INITIAL EVALUATION ADULT - MODIFIED CLINICAL TEST OF SENSORY INTEGRATION IN BALANCE TEST
dynamic sitting = good; dynamic standing = good
dynamic sitting edge of bed with supervision; dynamic standing with contact guard assistance

## 2021-02-08 NOTE — OCCUPATIONAL THERAPY INITIAL EVALUATION ADULT - WEIGHT-BEARING RESTRICTIONS: STAND/SIT, REHAB EVAL
skin normal color for race, warm, dry and intact.
weight-bearing as tolerated
weight-bearing as tolerated

## 2021-02-08 NOTE — PROGRESS NOTE ADULT - ASSESSMENT
59 yo woman with L MCA infarct s/p IV TPA 2/4/21, patient with worsening symptoms 2/6/21 now resolved. 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 MRI brain shows mild L frontal and L parietal SAH

## 2021-02-08 NOTE — PROGRESS NOTE ADULT - ASSESSMENT
59 y/o F with no PMHx, 3-5K runner daily, presents to  with aphasia, R arm weekend, Code stroke called- pt administered TPA at that tiem. CTA shows L MCA stroke. Pt transferred to [facility] for possible mechanical throbectomy. Symptoms have since resolved. Pt follows with Dr. Lockhart- cardiologist . Last stress test 2016-normal. TTE- "leaky valve, ut normal". former smoker, social drinker. Tele- SB today, patient states she is an avid runner 3K/day    2/7/2021, sitting up in chair, no new neurologic deficits noted.   Seen by neuro and recommending:    Anticoagulation for presumed stump emboli.  Heparin on hold secondary to high PTT and ? SAH on MRI.   Repeat CT ordered to assess this further.          Recurrent CVA- suspected embolic source  - CTA neck L- ICA occlusion  - CTA head- L MCA thrombus  - MRA vertebral artery dissection?  - Tele- no arrythmia - SB, no ectopy - given hx of avid runner maybe a normal variant.    - MICHELE no cardioembolic source, no PFO, appendage thrombus or aortic arch atheroma  - if suspicion for embolic source remains then can plan for ILR implant prior to discharge to monitor for occult pAfib, EPS eval.   - continue ASA 81mg, on anticoagulation plan for 6 months duration? need hypercoaguable workup  - on vasopressor, plan to add midodrine to main cerebral perfusion pressure      Juan Chaudhry DO, [facility]  Faculty Non-Invasive Cardiologist  [phone]

## 2021-02-08 NOTE — OCCUPATIONAL THERAPY INITIAL EVALUATION ADULT - SPECIAL TRAINING, OT EVAL
Functional mobility independently with no assistive device with overall good balance, proper foot placement/body positioning and safe negotiation of environmental obstacles. Pt without any losses of balance and with overall good safety awareness.
Functional mobility with contact guard assistance with no assistive device due to decreased strength and decreased balance; cues for foot placement, body positioning and safe negotiation of environmental obstacles. Pt requires increased time and verbal/tactile cues throughout mobility for safety.

## 2021-02-08 NOTE — OCCUPATIONAL THERAPY INITIAL EVALUATION ADULT - MANUAL MUSCLE TESTING RESULTS, REHAB EVAL
bilateral shoulder flexion grossly assessed with AROM against gravity 3/5, bilateral elbow flexion/extension grossly assessed with AROM against gravity 3/5, bilateral gross grasp 5/5
bilateral shoulder flexion grossly assessed with AROM against gravity 3/5, bilateral elbow flexion/extension grossly assessed with AROM against gravity 3/5, bilateral gross grasp 5/5

## 2021-02-08 NOTE — PHYSICAL THERAPY INITIAL EVALUATION ADULT - DIAGNOSIS, PT EVAL
Pt. not a candidate for skilled PT treatment due to lack of functional goals in current setting.
Impaired Functional Mobility, (+) code stroke called for expressive aphasia which progressively got worse but then resolved partially

## 2021-02-08 NOTE — PROGRESS NOTE ADULT - SUBJECTIVE AND OBJECTIVE BOX
60 year old right hand dominant female with no PMHx transferred from  with aphasia and right arm weakness that started at 920 this am. She states she was feeling in her usual state of health up until this morning when she was unable to speak to her  and became weak with her right arm. CODE Stroke was called at , NIH 2 and she received TPA at 1035. CTA/ CTP revealing thrombus distal to the L MCA Bifurication, 67ml of penumbra to left frontoparietal lobe. Pt transferred to [facility] for possible mechanical thrombectomy. Upon arrival, pt with improved symptoms, NIH 0. Pt does endorse recent sinus infection 2 weeks ago and completing abx course amoxicillin. Pt denies HA, dizziness, paresthesias, visual changes, difficulty with word finding, weakness.     2/4/21- Aphasic ; R sided sensory loss- [facility]- 2  2/6/2121- Aphasic  and  decreased sensation RUE and LE-- NSCU admission  NIH =1 - aphasia                - Aphasia and RUE sensation deficit improved at augmented BP to > 135 mmHg while on neosynephrine    NIH 0  mrS 0 (04 Feb 2021 15:09)    O/n events: none reported,  Labs, VS, imaging reviewed.    PHYSICAL EXAM:  No Acute Distress   Neurological: Awake, alert oriented to person, place and time, Following Commands, PERRL, EOMI, No Gaze Preference, Face Symmetrical, No dysmetria, No ataxia, No nystagmus, speech fluent.  Motor exam: 5/5 x4, no drift.                                               Sensation: WNL    Pulmonary: Clear to Auscultation, No rales, No rhonchi, No wheezes     Cardiovascular: S1, S2, Regular rate and rhythm     Gastrointestinal: Soft, Non-tender, Non-distended     Extremities: No calf tenderness

## 2021-02-08 NOTE — PROGRESS NOTE ADULT - SUBJECTIVE AND OBJECTIVE BOX
[place] [facility]                                                               Office: [address] [address]                                                              Telephone: [phone]. Fax:[phone]                                                                             PROGRESS NOTE  Reason for follow up: Recurrent CVA  Overnight: No new events.   Update: event on Saturday noted with recurrent CVA now in NSICU on vasopressor to maintain SBP goal 130-150s, also no heparin gtt plan to switch to Eliquis for ~6 months for suspected thromboembolic CVA with ICA occlusion, MICHELE done today no evidence of cardioembolic source, Tele in sinus with episode of sinus rene 40s on phenylephrine then switched to norepinephrine.       Review of symptoms:   Cardiac:  No chest pain. No dyspnea. No palpitations.  Respiratory: No cough. No dyspnea  Gastrointestinal: No diarrhea. No abdominal pain. No bleeding.     Past medical history: No updates.   	  Vital Signs Last 24 Hrs  T(C): 36.8 (02-08-21 @ 15:55), Max: 36.9 (02-07-21 @ 22:23)  T(F): 98.3 (02-08-21 @ 15:55), Max: 98.4 (02-07-21 @ 22:23)  HR: 52 (02-08-21 @ 18:00) (39 - 77)  BP: 141/76 (02-08-21 @ 18:00) (88/72 - 176/90)  BP(mean): 97 (02-08-21 @ 18:00) (69 - 151)  RR: 23 (02-08-21 @ 18:00) (12 - 27)  SpO2: 98% (02-08-21 @ 18:00) (87% - 100%)  I&O's Summary    07 Feb 2021 07:01  -  08 Feb 2021 07:00  --------------------------------------------------------  IN: 2659.7 mL / OUT: 3850 mL / NET: -1190.3 mL    08 Feb 2021 07:01  -  08 Feb 2021 18:04  --------------------------------------------------------  IN: 889 mL / OUT: 1600 mL / NET: -711 mL          PHYSICAL EXAM:  Appearance: Comfortable. No acute distress  HEENT:  Head and neck: Atraumatic. Normocephalic.  Normal oral mucosa, PERRL, Neck is supple. No JVD, No carotid bruit.   Neurologic: A&Ox 3, no focal deficits. EOMI, Cranial nerves are intact.  Lymphatic: No cervical lymphadenopathy  Cardiovascular: Normal S1 S2, No murmur, rubs/gallops. No JVD, No edema  Respiratory: Lungs clear to auscultation  Gastrointestinal:  Soft, Non-tender, + BS  Lower Extremities: No edema  Psychiatry: Patient is calm. No agitation. Mood & affect appropriate  Skin: No rashes/ecchymoses/cyanosis/ulcers visualized on the face, hands or feet.      CURRENT MEDICATIONS:  MEDICATIONS  (STANDING):  aspirin  chewable 81 milliGRAM(s) Oral daily  atorvastatin 40 milliGRAM(s) Oral at bedtime  fluticasone propionate 50 MICROgram(s)/spray Nasal Spray 1 Spray(s) Both Nostrils two times a day  heparin  Infusion 800 Unit(s)/Hr (8.25 mL/Hr) IV Continuous <Continuous>  influenza   Vaccine 0.5 milliLiter(s) IntraMuscular once  lidocaine   Patch 1 Patch Transdermal daily  methocarbamol 500 milliGRAM(s) Oral once  midodrine 15 milliGRAM(s) Oral every 8 hours  norepinephrine Infusion 0.05 MICROgram(s)/kG/Min (5.95 mL/Hr) IV Continuous <Continuous>  sodium chloride 0.9%. 1000 milliLiter(s) (75 mL/Hr) IV Continuous <Continuous>    MEDICATIONS  (PRN):  acetaminophen   Tablet .. 650 milliGRAM(s) Oral every 6 hours PRN Temp greater or equal to 38C (100.4F), Mild Pain (1 - 3)      DIAGNOSTIC TESTING:  < from: MICHELE Echo Doppler (02.08.21 @ 16:00) >    Summary:   1. Left ventricular ejection fraction, by visual estimation, is 60 to 65%.   2. Normal global left ventricular systolic function.   3. Normal RV size and function, estimated RVSP 27 mmHg.   4. Normal left atrial size.   5. Normal right atrial size. Prominent chiari network noted.   6. Color flow doppler and intravenous injection of agitated saline demonstrates the presence of an intact intra atrial septum. No evidence of PFO.   7. No left atrial appendage thrombus and normal left atrial appendage velocities.   8. Mild to moderate mitral valve regurgitation.   9. Mild aortic regurgitation.  10. Mild tricuspid regurgitation.  11. No evidence of atheroma at the aortic arch.  12. There is no evidence of pericardial effusion.  13. No obvious cardiogenic source of emboli.    < end of copied text >      Labs:                        12.3   9.28  )-----------( 233      ( 08 Feb 2021 03:57 )             35.6     02-08    141  |  106  |  8.0  ----------------------------<  96  3.6   |  24.0  |  0.55    Ca    9.1      08 Feb 2021 03:57  Phos  3.8     02-08  Mg     1.9     02-08            Cholesterol 169 mg/dL; Direct LDL --; HDL Cholesterol, Serum 64 mg/dL; HDL/ Total Cholesterol Ratio Measurement --; Total Cholesterol/ HDL Ratio Measurement --; Triglycerides, Serum 62 mg/dL  A1C with Estimated Average Glucose Result: 5.1 % (02-05-21 @ 06:44)      TELEMETRY: Sinus 40-60s, ventricular bigeminy

## 2021-02-08 NOTE — PHYSICAL THERAPY INITIAL EVALUATION ADULT - PERTINENT HX OF CURRENT PROBLEM, REHAB EVAL
presented to ED unable to speak to her  and became weak with her right arm, s/p tPA
presented to ED unable to speak to her  and became weak with her right arm, s/p tPA

## 2021-02-08 NOTE — OCCUPATIONAL THERAPY INITIAL EVALUATION ADULT - WEIGHT-BEARING RESTRICTIONS: SIT/STAND, REHAB EVAL
"   LOS: 16 days    Patient Care Team:  Angelica Tran PA as PCP - General (Internal Medicine)  Denver Baldwin MD as Consulting Physician (Nephrology)    Subjective     No acute events overnight. No new complaints.     Objective     Vital Signs:  Blood pressure 116/56, pulse 80, temperature 98.5 °F (36.9 °C), temperature source Oral, resp. rate 17, height 167.6 cm (66\"), weight 109 kg (240 lb), SpO2 97 %, not currently breastfeeding.    Flowsheet Rows      First Filed Value   Admission Height  167.6 cm (66\") Documented at 11/26/2018 1315   Admission Weight  109 kg (240 lb) Documented at 11/26/2018 1315          12/11 0701 - 12/12 0700  In: -   Out: 1200 [Urine:1200]    Physical Exam:    General Appearance: WD obese WF Confused  Neuro:  Confused   Psych:  Normal mood and affect  CV:   No edema  Lungs: respirations regular and unlabored  Abdomen: not distended  :  No loya, no palp bladder  Skin: No rash, Warm and dry.  Left foot with dressing in place      Labs:  Results from last 7 days   Lab Units  12/11/18   0636  12/10/18   0604   WBC 10*3/mm3  11.94*  13.92*   HEMOGLOBIN g/dL  9.3*  8.7*   PLATELETS 10*3/mm3  514*  497*     Results from last 7 days   Lab Units  12/11/18   0636  12/10/18   0604  12/08/18   0005   SODIUM mmol/L  137  138  141   POTASSIUM mmol/L  3.9  4.2  4.4   CHLORIDE mmol/L  107  106  110*   CO2 mmol/L  22.0  24.0  24.0   BUN mg/dL  28*  28*  41*   CREATININE mg/dL  3.36*  3.05*  3.34*   CALCIUM mg/dL  8.9  8.8  8.2*   PHOSPHORUS mg/dL   --    --   3.5   ALBUMIN g/dL   --    --   2.92*                   Estimated Creatinine Clearance: 22 mL/min (A) (by C-G formula based on SCr of 3.36 mg/dL (H)).         A/P:    CKD4: stable in baseline 3.5-4.0.  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 Scr 3.3 stable.     Proteinuria:  Presumed DN.  Ratio 3.1    HTN: stable.     Anemia:  On epogen. .  Transfuse PRN for Hgb < 7    Volume:  Stable.     Diabetic Foot Ulcer    Plan:  Continue with current regimen.  "
    Follow up with NAL in 2 weeks. She has been on off metolazone and lasix for more than a week. UOP has been excellent. Renal function stable. Continue to hold Lasix and metolazone at discharge. Will evaluate on follow up visit.      Dannie Luna MD  12/12/18  12:05 PM         
weight-bearing as tolerated
weight-bearing as tolerated

## 2021-02-08 NOTE — PHYSICAL THERAPY INITIAL EVALUATION ADULT - MUSCLE TONE ASSESSMENT, REHAB EVAL
bilateral upper extremities/bilateral lower extremities/normal
bilateral upper extremities/bilateral lower extremities/normal

## 2021-02-08 NOTE — OCCUPATIONAL THERAPY INITIAL EVALUATION ADULT - PRECAUTIONS/LIMITATIONS, REHAB EVAL
supervision with meals; head of bed 30 degrees
head of bed 30 degrees; supervision with meals/aspiration precautions/fall precautions

## 2021-02-08 NOTE — PHYSICAL THERAPY INITIAL EVALUATION ADULT - ADDITIONAL COMMENTS
as per pt. : resides in the private house 1 step to enter, 12 steps with single rail to the bedroom, (-) DME, (+) driving,  available to assist as needed upon D/C home
as per pt. : resides in the private house 1 step to enter, 12 steps with single rail to the bedroom, (-) DME, (+) driving,  available to assist as needed upon D/C home

## 2021-02-08 NOTE — PROGRESS NOTE ADULT - SUBJECTIVE AND OBJECTIVE BOX
Neurology Progress Note  [facility] at [address] [address]  [phone]    Interval History:  Patient feels back to baseline today, symptoms improved.      Physical Exam:  Vital Signs Last 24 Hrs  T(C): 36.7 (08 Feb 2021 12:05), Max: 37.4 (07 Feb 2021 15:30)  T(F): 98 (08 Feb 2021 12:05), Max: 99.4 (07 Feb 2021 15:30)  HR: 43 (08 Feb 2021 14:15) (39 - 77)  BP: 164/83 (08 Feb 2021 14:20) (88/72 - 175/96)  BP(mean): 106 (08 Feb 2021 14:20) (69 - 116)  RR: 21 (08 Feb 2021 14:15) (12 - 27)  SpO2: 99% (08 Feb 2021 14:15) (87% - 100%)  Mental Status: AAO x 3, no dysarthria, no aphasia  CN: PERRL, EOMI, VFF, V1-V3 sensation intact, no facial asymmetry  Motor:  5/5 x 4 extremities  Sensory: intact to light touch throughout  Coordination: no dysmetria on FTN  Gait: deferred  Mental Status: AAO x 3, no dysarthria, no aphasia  CN: PERRL, EOMI, VFF, V1-V3 sensation intact, no facial asymmetry  Motor:  5/5 x 4 extremities  Sensory: intact to light touch throughout  Coordination: no dysmetria on FTN  Gait: deferred    2/8/21 at 1:45 pm NIHSS: 0 MRS: 0    Imaging and labs:    LABS:                        12.3   9.28  )-----------( 233      ( 08 Feb 2021 03:57 )             35.6     02-08    141  |  106  |  8.0  ----------------------------<  96  3.6   |  24.0  |  0.55    Ca    9.1      08 Feb 2021 03:57  Phos  3.8     02-08  Mg     1.9     02-08      PTT - ( 08 Feb 2021 09:40 )  PTT:69.4 sec    MRI brain : Acute left MCA territory infarcts. Mild left frontal lobe and left parietal lobe subarachnoid hemorrhage.    MRA Neck: Occlusion of the left internal carotid artery seen.    Long segment of narrowing involving a dominant right vertebral artery seen proximally. This could be compatible with underlying dissection.

## 2021-02-08 NOTE — PHYSICAL THERAPY INITIAL EVALUATION ADULT - ACTIVE RANGE OF MOTION EXAMINATION, REHAB EVAL
assessed during functional mobility, resistance not applied/bilateral upper extremity Active ROM was WFL (within functional limits)/bilateral  lower extremity Active ROM was WFL (within functional limits)
Afshin UE/LE 5/5/bilateral upper extremity Active ROM was WFL (within functional limits)/bilateral  lower extremity Active ROM was WFL (within functional limits)

## 2021-02-09 LAB
ANION GAP SERPL CALC-SCNC: 14 MMOL/L — SIGNIFICANT CHANGE UP (ref 5–17)
BUN SERPL-MCNC: 12 MG/DL — SIGNIFICANT CHANGE UP (ref 8–20)
CALCIUM SERPL-MCNC: 9 MG/DL — SIGNIFICANT CHANGE UP (ref 8.6–10.2)
CHLORIDE SERPL-SCNC: 103 MMOL/L — SIGNIFICANT CHANGE UP (ref 98–107)
CO2 SERPL-SCNC: 22 MMOL/L — SIGNIFICANT CHANGE UP (ref 22–29)
CREAT SERPL-MCNC: 0.66 MG/DL — SIGNIFICANT CHANGE UP (ref 0.5–1.3)
GLUCOSE SERPL-MCNC: 111 MG/DL — HIGH (ref 70–99)
HCT VFR BLD CALC: 38.8 % — SIGNIFICANT CHANGE UP (ref 34.5–45)
HGB BLD-MCNC: 13.7 G/DL — SIGNIFICANT CHANGE UP (ref 11.5–15.5)
MAGNESIUM SERPL-MCNC: 1.7 MG/DL — SIGNIFICANT CHANGE UP (ref 1.6–2.6)
MCHC RBC-ENTMCNC: 31.9 PG — SIGNIFICANT CHANGE UP (ref 27–34)
MCHC RBC-ENTMCNC: 35.3 GM/DL — SIGNIFICANT CHANGE UP (ref 32–36)
MCV RBC AUTO: 90.2 FL — SIGNIFICANT CHANGE UP (ref 80–100)
PHOSPHATE SERPL-MCNC: 3.6 MG/DL — SIGNIFICANT CHANGE UP (ref 2.4–4.7)
PLATELET # BLD AUTO: 256 K/UL — SIGNIFICANT CHANGE UP (ref 150–400)
POTASSIUM SERPL-MCNC: 3.8 MMOL/L — SIGNIFICANT CHANGE UP (ref 3.5–5.3)
POTASSIUM SERPL-SCNC: 3.8 MMOL/L — SIGNIFICANT CHANGE UP (ref 3.5–5.3)
RBC # BLD: 4.3 M/UL — SIGNIFICANT CHANGE UP (ref 3.8–5.2)
RBC # FLD: 11.4 % — SIGNIFICANT CHANGE UP (ref 10.3–14.5)
SODIUM SERPL-SCNC: 139 MMOL/L — SIGNIFICANT CHANGE UP (ref 135–145)
WBC # BLD: 14.61 K/UL — HIGH (ref 3.8–10.5)
WBC # FLD AUTO: 14.61 K/UL — HIGH (ref 3.8–10.5)

## 2021-02-09 PROCEDURE — 99233 SBSQ HOSP IP/OBS HIGH 50: CPT

## 2021-02-09 PROCEDURE — 99223 1ST HOSP IP/OBS HIGH 75: CPT

## 2021-02-09 PROCEDURE — 93975 VASCULAR STUDY: CPT | Mod: 26

## 2021-02-09 PROCEDURE — 99234 HOSP IP/OBS SM DT SF/LOW 45: CPT

## 2021-02-09 RX ORDER — MAGNESIUM SULFATE 500 MG/ML
2 VIAL (ML) INJECTION ONCE
Refills: 0 | Status: COMPLETED | OUTPATIENT
Start: 2021-02-09 | End: 2021-02-09

## 2021-02-09 RX ORDER — ALPRAZOLAM 0.25 MG
0.25 TABLET ORAL EVERY 8 HOURS
Refills: 0 | Status: DISCONTINUED | OUTPATIENT
Start: 2021-02-09 | End: 2021-02-12

## 2021-02-09 RX ADMIN — LIDOCAINE 1 PATCH: 4 CREAM TOPICAL at 11:50

## 2021-02-09 RX ADMIN — Medication 650 MILLIGRAM(S): at 22:33

## 2021-02-09 RX ADMIN — APIXABAN 5 MILLIGRAM(S): 2.5 TABLET, FILM COATED ORAL at 06:11

## 2021-02-09 RX ADMIN — LIDOCAINE 1 PATCH: 4 CREAM TOPICAL at 22:36

## 2021-02-09 RX ADMIN — MIDODRINE HYDROCHLORIDE 15 MILLIGRAM(S): 2.5 TABLET ORAL at 13:55

## 2021-02-09 RX ADMIN — APIXABAN 5 MILLIGRAM(S): 2.5 TABLET, FILM COATED ORAL at 17:03

## 2021-02-09 RX ADMIN — Medication 81 MILLIGRAM(S): at 11:51

## 2021-02-09 RX ADMIN — MIDODRINE HYDROCHLORIDE 15 MILLIGRAM(S): 2.5 TABLET ORAL at 06:11

## 2021-02-09 RX ADMIN — ATORVASTATIN CALCIUM 40 MILLIGRAM(S): 80 TABLET, FILM COATED ORAL at 22:33

## 2021-02-09 RX ADMIN — Medication 1 SPRAY(S): at 06:11

## 2021-02-09 RX ADMIN — LIDOCAINE 1 PATCH: 4 CREAM TOPICAL at 19:35

## 2021-02-09 RX ADMIN — MIDODRINE HYDROCHLORIDE 15 MILLIGRAM(S): 2.5 TABLET ORAL at 22:33

## 2021-02-09 RX ADMIN — Medication 50 GRAM(S): at 06:13

## 2021-02-09 NOTE — CONSULT NOTE ADULT - SUBJECTIVE AND OBJECTIVE BOX
[facility]                                                               CARDIAC ELECTROPHYSIOLOGY                                                       [facility] [place]                                                      Office: [address] [facility], [place] [address]                                                       Telephone: [phone]. Fax:[phone]    HPI:  Cadence Mackey is a 60 year old woman with no significant past medical history who presented to [facility] with aphasia and right arm weakness on 2/4/21. She was in her usual state of health until she noticed that when talking to her  she had trouble speaking and could not move her right arm. She was taken to [facility] and received tPA. CTA/CT Perfusion revealed a thrombus distal to L MCA bifurcation with a penumbra and so she was transferred to [facility] for possible thrombectomy, but upon arrival her symptoms had resolved. She underwent full work up including MICHELE with no evidence of cause of stroke. 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 MICHELE was performed on 2/8/21 which did not show reason for CVA. Today the patient is doing well without any complaints. She denies having palpitations.    PAST MEDICAL & SURGICAL HISTORY:  No pertinent past medical history    REVIEW OF SYSTEMS: As per HPI. Remaining 10 point ROS were reviewed and are negative.    MEDICATIONS  (STANDING):  apixaban 5 milliGRAM(s) Oral two times a day  aspirin  chewable 81 milliGRAM(s) Oral daily  atorvastatin 40 milliGRAM(s) Oral at bedtime  fluticasone propionate 50 MICROgram(s)/spray Nasal Spray 1 Spray(s) Both Nostrils two times a day  influenza   Vaccine 0.5 milliLiter(s) IntraMuscular once  lidocaine   Patch 1 Patch Transdermal daily  midodrine 15 milliGRAM(s) Oral every 8 hours  norepinephrine Infusion 0.05 MICROgram(s)/kG/Min (5.95 mL/Hr) IV Continuous <Continuous>  sodium chloride 0.9%. 1000 milliLiter(s) (75 mL/Hr) IV Continuous <Continuous>    MEDICATIONS  (PRN):  acetaminophen   Tablet .. 650 milliGRAM(s) Oral every 6 hours PRN Temp greater or equal to 38C (100.4F), Mild Pain (1 - 3)    Allergies  No Known Allergies    SOCIAL HISTORY:  Denies smoking, illicit drug use  Social EtOH use    FAMILY HISTORY:  Father with stroke, MI, HFrEF, ICD    Vital Signs Last 24 Hrs  T(C): 37 (09 Feb 2021 04:00), Max: 37 (08 Feb 2021 20:22)  T(F): 98.6 (09 Feb 2021 04:00), Max: 98.6 (08 Feb 2021 20:22)  HR: 55 (09 Feb 2021 09:00) (43 - 77)  BP: 115/103 (09 Feb 2021 09:00) (88/72 - 176/90)  BP(mean): 109 (09 Feb 2021 09:00) (75 - 151)  RR: 20 (09 Feb 2021 09:00) (12 - 27)  SpO2: 99% (09 Feb 2021 09:00) (87% - 100%)    Physical Exam:  Appearance: Normal, well nourished  HEENT:   Normal oral mucosa, PERRL, EOMI, sclera non-icteric	  Cardiovascular: Normal S1 S2, No JVD, No cardiac murmurs, No carotid bruits, No peripheral edema  Respiratory: Lungs clear to auscultation, unlabored, no rhonchi/rales/wheezes  Psychiatry: A & O x 3, Mood & affect appropriate  Gastrointestinal:  Soft, Non-tender, + BS, no bruits	  Skin: No rashes, No ecchymoses, No cyanosis  Neurologic: Grossly non-focal with full strength in all four extremities  Extremities: Normal range of motion, No clubbing, cyanosis or edema    LABS:             13.7   14.61 )-----------( 256      ( 09 Feb 2021 04:01 )             38.8   02-09    139  |  103  |  12.0  ----------------------------<  111<H>  3.8   |  22.0  |  0.66    Ca    9.0      09 Feb 2021 04:01  Phos  3.6     02-09  Mg     1.7     02-09    PTT - ( 08 Feb 2021 14:51 )  PTT:56.6 sec    RADIOLOGY & ADDITIONAL STUDIES:  2/8/21 MICHELE:  Summary:   1. Left ventricular ejection fraction, by visual estimation, is 60 to 65%.   2. Normal global left ventricular systolic function.   3. Normal RV size and function, estimated RVSP 27 mmHg.   4. Normal left atrial size.   5. Normal right atrial size. Prominent chiari network noted.   6. Color flow doppler and intravenous injection of agitated saline demonstrates the presence of an intact intra atrial septum. No evidence of PFO.   7. No left atrial appendage thrombus and normal left atrial appendage velocities.   8. Mild to moderate mitral valve regurgitation.   9. Mild aortic regurgitation.  10. Mild tricuspid regurgitation.  11. No evidence of atheroma at the aortic arch.  12. There is no evidence of pericardial effusion.  13. No obvious cardiogenic source of emboli.    < from: TTE Echo Limited or F/U (02.05.21 @ 17:21) >  Summary:   1. Technically good study.   2. Normal global left ventricular systolic function.   3. Left ventricular ejection fraction, by visual estimation, is 60 to 65%.   4. Normal left atrial size.   5. Normalright atrial size.   6. Mild mitral annular calcification.   7. Mild mitral valve regurgitation.   8. There is no evidence of pericardial effusion.    < from: MR Head No Cont (02.07.21 @ 12:09) >  IMPRESSION: Acute left MCA territory infarcts. Mild left frontal lobe and left parietal lobe subarachnoid hemorrhage. Follow-up head CT is recommended. Findings discussed with Dr. Thomas.    < from: MR Angio Neck w/wo IV Cont (02.07.21 @ 12:09) >  IMPRESSION: Occlusion of the left internal carotid artery seen.    Long segment of narrowing involving a dominant right vertebral artery seen proximally. This could be compatible with underlying dissection.    < from: CT Perfusion w/ Maps w/ IV Cont (02.06.21 @ 12:27) >  CBF<30%:0 ml  Tmax>6.0s: 60 mL  Mismatch volume: 60 mL  Mismatch ratio: Infinite.    IMPRESSION: CT perfusion as described above.    < from: CT Angio Neck w/ IV Cont (02.06.21 @ 10:38) >  IMPRESSION: CT angiogram of the neck appears normal.    Decreased flow related enhancement is seen involving the left M2 and M3 segments.

## 2021-02-09 NOTE — PROGRESS NOTE ADULT - PROBLEM SELECTOR PLAN 1
C/w AC as per neuroendovascular for suspected vert dissection  MICHELE unremarkable, ILR prior to discharge
heparin drip with PTT goal 60-80 as recommended by neuroendovascular for suspected vert dissection  Agree with MICHELE and if neg loop recorder for cryptogenic stroke  hypercoagulable panel as outpatient  SBP goal 130-150s  neurochecks q 1 h

## 2021-02-09 NOTE — CONSULT NOTE ADULT - ASSESSMENT
Cadence Mackey is a 60 year old woman with no significant past medical history who presented to [facility] with aphasia and right arm weakness on 2/4/21 found to have acute L MCA occlusion and acute L ICA occlusion with a hospital course complicated by R vertebral dissection. In discussion, with NSICU team, there does not appear to be a clear source for her thrombus and embolus. There is no atherosclerotic disease and the L ICA occlusion does not appear to be due to dissection. As such, it appears that the patient has cryptogenic stroke. Telemetry monitoring does not reveal any evidence of arrhythmia. As per CRYSTAL-AF trial, she would benefit with ILR implantation for long term detection of AF if no AF is seen during her hospitalization. All risks, benefits, alternatives and costs were reviewed with the patient and she agrees. Will plan to pursue ILR implant before discharge.    Recommendations:  - ILR implant before discharge  - Patient to remain on Apixaban for R vertebral artery dissection for a few months, but not lifelong    Discussed with NSICU team.    Thank you for this consult. We will follow with you.    Bora Drake MD  Clinical Cardiac Electrophysiology

## 2021-02-09 NOTE — PROGRESS NOTE ADULT - ASSESSMENT
ASSESSMENT/PLAN:  60 year old female with no PMH presents with thrombus distal to L MCA bifurcation. Symptoms resolved s/p TPA.   Recurrence of Sx with NIH 1 on 2/6/21.  R vertebral dissection.  Small L frontal L parietal SAH on MR, not visualized on CTh.    PLAN:  Neuro:  - q2 hr neuro checks   - on Heparin drip - switch to Eliquis PO  - Dago-Synephrine drip-  HTN augmentation - improved with SBP > 135mmHg - went to rene 40, switch to Levo for now; weaning ggt with PO Midodrine  - ASA q day  -DAVIAN involved - no intervention for now, medical management recommended.  - PT/OT/ Speech    CV:   --150  - LDL - goal 70 - atorvastatin 20mg  qday   -repeat EKG     Respiratory:  -RA  -SPO2 >92  -Incentive Spirometry    GI: diet as tolerated  Stool softeners     Heme:  AC as above, +SCDs    Endo:  monitor FS     ID: Chronic sinusitis   Flonase  q day  Afebrile    CODE STATUS:  [X] Full Code    DISPOSITION:  [X] ICU     [X] Patient is  critically ill and monitor for recurrent stroke     Time spent: 40 critical care minutes ASSESSMENT/PLAN:  60 year old female with no PMH presents with thrombus distal to L MCA bifurcation. Symptoms resolved s/p TPA.   Recurrence of Sx with NIH 1 on 2/6/21, resolved with BP augmentation to 135-150. Currently, off Levo with SBP 90-110s.  R vertebral dissection, aSx.  Small L frontal L parietal SAH on MR, not visualized on CTh.    PLAN:  Neuro:  - q2 hr neuro checks   - cont Eliquis PO + ASA for vertebral dissection and L MCA occlusion/stroke   - HTN augmentation - on PO Midodrine, keep for now, off Levo since am, tolerating well, no Sx although noted to be hypotensive  -DAVIAN involved - no intervention for now, medical management recommended.  - PT/OT/ Speech    CV:   --150  - LDL - goal 70 - atorvastatin 20mg  qday   -plan for ILR as w/up for stroke; MICHELE WNL  -soft BP off Levo, will monitor as no Sx, decrease mobilization? maintain adequate hydration, check cortisol levels in am    Respiratory:  -RA  -SPO2 >92  -Incentive Spirometry    GI: diet as tolerated  Stool softeners     Heme:  AC as above, +SCDs    Endo:  monitor FS     ID: Chronic sinusitis   Flonase  q day  Afebrile    CODE STATUS:  [X] Full Code    DISPOSITION:  [X] ICU     [X] Patient is  critically ill and monitor for recurrent stroke     Time spent: 40 critical care minutes

## 2021-02-09 NOTE — PROGRESS NOTE ADULT - SUBJECTIVE AND OBJECTIVE BOX
[facility]                                                               Office: [address] [address]                                                              Telephone: [phone]. Fax:[phone]                                                                             PROGRESS NOTE  Reason for follow up: Stroke  Update: 2/9- patient sitting in chair asymptomatic. remains on norepinephrine for BP control. MICHELE nml. EP eval for ILR on DC.      Review of symptoms:   All review of systems negative unless indicated otherwise above.     	  Vitals:  T(C): 37 (02-09-21 @ 12:00), Max: 37 (02-08-21 @ 20:22)  HR: 63 (02-09-21 @ 12:00) (43 - 79)  BP: 92/56 (02-09-21 @ 12:00) (68/29 - 176/90)  RR: 21 (02-09-21 @ 12:00) (12 - 30)  SpO2: 97% (02-09-21 @ 12:00) (93% - 100%)  Wt(kg): --  I&O's Summary    08 Feb 2021 07:01  -  09 Feb 2021 07:00  --------------------------------------------------------  IN: 2079 mL / OUT: 2150 mL / NET: -71 mL    09 Feb 2021 07:01  -  09 Feb 2021 13:55  --------------------------------------------------------  IN: 586 mL / OUT: 220 mL / NET: 366 mL      Weight (kg): 63.5 (02-04 @ 15:24)      PHYSICAL EXAM:  Appearance: NAD, well nourished	  Neurologic: A&Ox3, PERRL, EOMI, Grossly non-focal with full strength in all four extremities  HEENT:   Normal oral mucosa, sclera non-icteric	  Lymphatic: No cervical lymphadenopathy  Cardiovascular: Normal S1 S2, No JVD, No cardiac murmurs, No carotid bruits  Respiratory: Lungs clear to auscultation	  Psychiatry: Mood & affect appropriate  Gastrointestinal:  Soft, Non-tender, + BS, no bruits	  Extremities: Normal range of motion, No clubbing, cyanosis or edema  Vascular: Peripheral pulses palpable 2+ bilaterally  Skin: No rashes, No Erythema, No ecchymoses, No cyanosis  Lines and Tubes: n/a      CURRENT MEDICATIONS:  midodrine 15 milliGRAM(s) Oral every 8 hours    atorvastatin  apixaban  aspirin  chewable  fluticasone propionate 50 MICROgram(s)/spray Nasal Spray  influenza   Vaccine  lidocaine   Patch      DIAGNOSTIC TESTING:  [ ] Echocardiogram:   < from: TTE Echo Limited or F/U (02.05.21 @ 17:21) >  Summary:   1. Technically good study.   2. Normal global left ventricular systolic function.   3. Left ventricular ejection fraction, by visual estimation, is 60 to 65%.   4. Normal left atrial size.   5. Normalright atrial size.   6. Mild mitral annular calcification.   7. Mild mitral valve regurgitation.   8. There is no evidence of pericardial effusion.    MD Zulma Electronically signed on 2/6/2021 at 8:53:37 AM    < end of copied text >    < from: MICHELE Echo Doppler (02.08.21 @ 16:00) >  Summary:   1. Left ventricular ejection fraction, by visual estimation, is 60 to 65%.   2. Normal global left ventricular systolic function.   3. Normal RV size and function, estimated RVSP 27 mmHg.   4. Normal left atrial size.   5. Normal right atrial size. Prominent chiari network noted.   6. Color flow doppler and intravenous injection of agitated saline demonstrates the presence of an intact intra atrial septum. No evidence of PFO.   7. No left atrial appendage thrombus and normal left atrial appendage velocities.   8. Mild to moderate mitral valve regurgitation.   9. Mild aortic regurgitation.  10. Mild tricuspid regurgitation.  11. No evidence of atheroma at the aortic arch.  12. There is no evidence of pericardial effusion.  13. No obvious cardiogenic source of emboli.    Juan Wallace DO Electronically signed on 2/8/2021 at 5:05:51 PM    < end of copied text >    [ ]  Catheterization:  [ ] Stress Test:    OTHER: 	      LABS:	 	                            13.7   14.61 )-----------( 256      ( 09 Feb 2021 04:01 )             38.8     02-09    139  |  103  |  12.0  ----------------------------<  111<H>  3.8   |  22.0  |  0.66    Ca    9.0      09 Feb 2021 04:01  Phos  3.6     02-09  Mg     1.7     02-09      proBNP:   Lipid Profile: Date: 02-05 @ 05:10  Total cholesterol 169; Direct LDL: --; HDL: 64; Triglycerides:62    HgA1c:   TSH: Thyroid Stimulating Hormone, Serum: 2.51 uIU/mL        TELEMETRY: Reviewed

## 2021-02-09 NOTE — PROGRESS NOTE ADULT - SUBJECTIVE AND OBJECTIVE BOX
60 year old right hand dominant female with no PMHx transferred from  with aphasia and right arm weakness that started at 920 this am. She states she was feeling in her usual state of health up until this morning when she was unable to speak to her  and became weak with her right arm. CODE Stroke was called at , NIH 2 and she received TPA at 1035. CTA/ CTP revealing thrombus distal to the L MCA Bifurication, 67ml of penumbra to left frontoparietal lobe. Pt transferred to [facility] for possible mechanical thrombectomy. Upon arrival, pt with improved symptoms, NIH 0. Pt does endorse recent sinus infection 2 weeks ago and completing abx course amoxicillin. Pt denies HA, dizziness, paresthesias, visual changes, difficulty with word finding, weakness.     2/4/21- Aphasic ; R sided sensory loss- [facility]- 2  2/6/2121- Aphasic  and  decreased sensation RUE and LE-- NSCU admission  NIH =1 - aphasia                - Aphasia and RUE sensation deficit improved at augmented BP to > 135 mmHg while on neosynephrine    NIH 0  mrS 0 (04 Feb 2021 15:09)    O/n events: none reported,  Labs, VS, imaging reviewed.    PHYSICAL EXAM:  No Acute Distress   Neurological: Awake, alert oriented to person, place and time, Following Commands, PERRL, EOMI, No Gaze Preference, Face Symmetrical, No dysmetria, No ataxia, No nystagmus, speech fluent.  Motor exam: 5/5 x4, no drift.                                               Sensation: WNL    Pulmonary: Clear to Auscultation, No rales, No rhonchi, No wheezes     Cardiovascular: S1, S2, Regular rate and rhythm     Gastrointestinal: Soft, Non-tender, Non-distended     Extremities: No calf tenderness        60 year old right hand dominant female with no PMHx transferred from  with aphasia and right arm weakness that started at 920 this am. She states she was feeling in her usual state of health up until this morning when she was unable to speak to her  and became weak with her right arm. CODE Stroke was called at , NIH 2 and she received TPA at 1035. CTA/ CTP revealing thrombus distal to the L MCA Bifurication, 67ml of penumbra to left frontoparietal lobe. Pt transferred to [facility] for possible mechanical thrombectomy. Upon arrival, pt with improved symptoms, NIH 0. Pt does endorse recent sinus infection 2 weeks ago and completing abx course amoxicillin. Pt denies HA, dizziness, paresthesias, visual changes, difficulty with word finding, weakness.     2/4/21- Aphasic ; R sided sensory loss- NI[facility]- 2  2/6/2121- Aphasic  and  decreased sensation RUE and LE-- NSCU admission  NIH =1 - aphasia                - Aphasia and RUE sensation deficit improved at augmented BP to > 135 mmHg while on neosynephrine    NIH 0  mrS 0 (04 Feb 2021 15:09)    O/n events: none reported.  Labs, VS, imaging reviewed.    PHYSICAL EXAM:  No Acute Distress   Neurological: Awake, alert oriented to person, place and time, Following Commands, PERRL, EOMI, No Gaze Preference, Face Symmetrical, No dysmetria, No ataxia, No nystagmus, speech fluent.  Motor exam: 5/5 x4, no drift.                                               Sensation: WNL    Pulmonary: Clear to Auscultation, No rales, No rhonchi, No wheezes     Cardiovascular: S1, S2, Regular rate and rhythm     Gastrointestinal: Soft, Non-tender, Non-distended     Extremities: No calf tenderness

## 2021-02-09 NOTE — PROGRESS NOTE ADULT - ASSESSMENT
59 y/o F with no PMHx, 3-5K runner daily, presents to  with aphasia, R arm weekend, Code stroke called- pt administered TPA at that tiem. CTA shows L MCA stroke. Pt transferred to [facility] for possible mechanical throbectomy. Symptoms have since resolved. Pt follows with Dr. Lockhart- cardiologist . Last stress test 2016-normal. TTE- "leaky valve, ut normal". former smoker, social drinker. Tele- SB today, patient states she is an avid runner 3K/day    2/9- patient sitting in chair asymptomatic. remains on norepinephrine for BP control. MICHELE nml. EP eval for ILR on DC.         Recurrent CVA- suspected embolic source  - CTA neck L- ICA occlusion  - CTA head- L MCA thrombus  - MRA vertebral artery dissection?  - Tele- no arrythmia - SB/SR, no ectopy - given hx of avid runner maybe a normal variant.    - MICHELE no cardioembolic source, no PFO, appendage thrombus or aortic arch atheroma  - if suspicion for embolic source remains then can plan for ILR implant prior to discharge to monitor for occult pAfib, EP recs appreciated.   - continue ASA 81mg, on anticoagulation plan for 6 months duration? need hypercoagulable workup  - wean off norepinephrine, plan to add midodrine to main cerebral perfusion pressure      Assessment and recommendations are final when note is signed by the attending.

## 2021-02-09 NOTE — PROGRESS NOTE ADULT - ASSESSMENT
61 yo woman with L MCA infarct s/p IV TPA 2/4/21, patient with worsening symptoms 2/6/21 now resolved. 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 MRI brain shows mild L frontal and L parietal SAH

## 2021-02-09 NOTE — PROGRESS NOTE ADULT - SUBJECTIVE AND OBJECTIVE BOX
Neurology Progress Note  [facility] at [address] [address]  [phone]    Interval History:  No acute overnight events, patient feels she is at her baseline.      Physical Exam:  Vital Signs Last 24 Hrs  T(C): 37.1 (09 Feb 2021 16:21), Max: 37.1 (09 Feb 2021 16:21)  T(F): 98.7 (09 Feb 2021 16:21), Max: 98.7 (09 Feb 2021 16:21)  HR: 65 (09 Feb 2021 15:00) (45 - 79)  BP: 124/106 (09 Feb 2021 15:00) (68/29 - 155/85)  BP(mean): 114 (09 Feb 2021 15:00) (39 - 114)  RR: 27 (09 Feb 2021 15:00) (12 - 30)  SpO2: 98% (09 Feb 2021 15:00) (94% - 100%)  Mental Status: AAO x 3, no dysarthria, no aphasia  CN: PERRL, EOMI, VFF, V1-V3 sensation intact, no facial asymmetry  Motor:  5/5 x 4 extremities  Sensory: intact to light touch throughout  Coordination: no dysmetria on FTN  Gait: deferred      LABS:  cret                        13.7   14.61 )-----------( 256      ( 09 Feb 2021 04:01 )             38.8     02-09    139  |  103  |  12.0  ----------------------------<  111<H>  3.8   |  22.0  |  0.66    Ca    9.0      09 Feb 2021 04:01  Phos  3.6     02-09  Mg     1.7     02-09      PTT - ( 08 Feb 2021 14:51 )  PTT:56.6 sec    MRI brain : Acute left MCA territory infarcts. Mild left frontal lobe and left parietal lobe subarachnoid hemorrhage.    MRA Neck: Occlusion of the left internal carotid artery seen.    Long segment of narrowing involving a dominant right vertebral artery seen proximally. This could be compatible with underlying dissection.

## 2021-02-10 LAB
ANION GAP SERPL CALC-SCNC: 9 MMOL/L — SIGNIFICANT CHANGE UP (ref 5–17)
BUN SERPL-MCNC: 14 MG/DL — SIGNIFICANT CHANGE UP (ref 8–20)
CALCIUM SERPL-MCNC: 8.8 MG/DL — SIGNIFICANT CHANGE UP (ref 8.6–10.2)
CHLORIDE SERPL-SCNC: 107 MMOL/L — SIGNIFICANT CHANGE UP (ref 98–107)
CO2 SERPL-SCNC: 23 MMOL/L — SIGNIFICANT CHANGE UP (ref 22–29)
CORTIS AM PEAK SERPL-MCNC: 7.4 UG/DL — SIGNIFICANT CHANGE UP (ref 6–18.4)
CREAT SERPL-MCNC: 0.68 MG/DL — SIGNIFICANT CHANGE UP (ref 0.5–1.3)
GLUCOSE SERPL-MCNC: 92 MG/DL — SIGNIFICANT CHANGE UP (ref 70–99)
HCT VFR BLD CALC: 34 % — LOW (ref 34.5–45)
HGB BLD-MCNC: 11.8 G/DL — SIGNIFICANT CHANGE UP (ref 11.5–15.5)
MAGNESIUM SERPL-MCNC: 2.1 MG/DL — SIGNIFICANT CHANGE UP (ref 1.6–2.6)
MCHC RBC-ENTMCNC: 31.6 PG — SIGNIFICANT CHANGE UP (ref 27–34)
MCHC RBC-ENTMCNC: 34.7 GM/DL — SIGNIFICANT CHANGE UP (ref 32–36)
MCV RBC AUTO: 90.9 FL — SIGNIFICANT CHANGE UP (ref 80–100)
PHOSPHATE SERPL-MCNC: 3.8 MG/DL — SIGNIFICANT CHANGE UP (ref 2.4–4.7)
PLATELET # BLD AUTO: 198 K/UL — SIGNIFICANT CHANGE UP (ref 150–400)
POTASSIUM SERPL-MCNC: 4.5 MMOL/L — SIGNIFICANT CHANGE UP (ref 3.5–5.3)
POTASSIUM SERPL-SCNC: 4.5 MMOL/L — SIGNIFICANT CHANGE UP (ref 3.5–5.3)
RBC # BLD: 3.74 M/UL — LOW (ref 3.8–5.2)
RBC # FLD: 11.6 % — SIGNIFICANT CHANGE UP (ref 10.3–14.5)
SODIUM SERPL-SCNC: 138 MMOL/L — SIGNIFICANT CHANGE UP (ref 135–145)
WBC # BLD: 6.31 K/UL — SIGNIFICANT CHANGE UP (ref 3.8–10.5)
WBC # FLD AUTO: 6.31 K/UL — SIGNIFICANT CHANGE UP (ref 3.8–10.5)

## 2021-02-10 PROCEDURE — 99232 SBSQ HOSP IP/OBS MODERATE 35: CPT

## 2021-02-10 PROCEDURE — 99233 SBSQ HOSP IP/OBS HIGH 50: CPT

## 2021-02-10 PROCEDURE — 74177 CT ABD & PELVIS W/CONTRAST: CPT | Mod: 26

## 2021-02-10 PROCEDURE — 71260 CT THORAX DX C+: CPT | Mod: 26

## 2021-02-10 RX ADMIN — LIDOCAINE 1 PATCH: 4 CREAM TOPICAL at 16:09

## 2021-02-10 RX ADMIN — Medication 650 MILLIGRAM(S): at 07:31

## 2021-02-10 RX ADMIN — APIXABAN 5 MILLIGRAM(S): 2.5 TABLET, FILM COATED ORAL at 05:32

## 2021-02-10 RX ADMIN — MIDODRINE HYDROCHLORIDE 15 MILLIGRAM(S): 2.5 TABLET ORAL at 20:47

## 2021-02-10 RX ADMIN — Medication 1 SPRAY(S): at 17:47

## 2021-02-10 RX ADMIN — APIXABAN 5 MILLIGRAM(S): 2.5 TABLET, FILM COATED ORAL at 17:48

## 2021-02-10 RX ADMIN — MIDODRINE HYDROCHLORIDE 15 MILLIGRAM(S): 2.5 TABLET ORAL at 05:32

## 2021-02-10 RX ADMIN — Medication 81 MILLIGRAM(S): at 11:28

## 2021-02-10 RX ADMIN — ATORVASTATIN CALCIUM 40 MILLIGRAM(S): 80 TABLET, FILM COATED ORAL at 20:47

## 2021-02-10 RX ADMIN — LIDOCAINE 1 PATCH: 4 CREAM TOPICAL at 11:29

## 2021-02-10 RX ADMIN — Medication 650 MILLIGRAM(S): at 18:41

## 2021-02-10 NOTE — PROGRESS NOTE ADULT - ASSESSMENT
60y Female who is followed by neurology because of stroke.    Stroke.   Left hemisphere symptoms.   Has occluded left ICA and right vertebral artery dissection  continue eliquis (likely 3-6 months) and ASA  continue lipitor  avoid hypotension to ensure adequate cerebral perfusion- continue midrodrine  will need ILR prior to discharge    will follow with you    Sheldon Randolph MD PhD   [id number]

## 2021-02-10 NOTE — CHART NOTE - NSCHARTNOTEFT_GEN_A_CORE
Patient stable for transfer to telemetry floor  Neurology following  EP following  Cardiology following    D/w Dr. Hernandez  Reconsult NSICU PRN

## 2021-02-10 NOTE — PROGRESS NOTE ADULT - ATTENDING COMMENTS
Will continue to follow
Bora Drake MD  Clinical Cardiac Electrophysiology
Will continue to follow
-Thromboembolic CVA of unclear source, currently on apixaban, need eventual hypercoaguable workup as outpatient  -ILR implant with EPS prior to discharge   -currently on midodrine to augment SBP to maintain cerebral perfusion- monitor HR response given on high dose can lead to bradycardia  -continue follow up with her outside cardiologist Dr. Richy Chaudhry DO, [facility]  Faculty Non-Invasive Cardiologist  [phone]
pt with new neurologic symptoms  No afib. As per neurology neurologic symptoms are related to ICA occlusion.   AC is recommended.  We will follow with you.   If needed, we can perform a MICHELE, although it appears that consensus  from multiple teams is that this is due to ICA occlusion. There is a question of dissection that is raised by radiologist.   Spoke with Dr Nino as well.   Mgmt as per neurology and NS ICU.  S/W NSICU-CONG Amor, Pt is started on AC with heparin drip.
Above noted and DW NP.  Pt's recurrent neurologic symptoms are not though to be from a cardio-embolic source at this time  No need for MICHELE.  Please call if we can be of further assistance.  Thank you.

## 2021-02-10 NOTE — PROGRESS NOTE ADULT - SUBJECTIVE AND OBJECTIVE BOX
60 year old right hand dominant female with no PMHx transferred from  with aphasia and right arm weakness that started at 920 this am. She states she was feeling in her usual state of health up until this morning when she was unable to speak to her  and became weak with her right arm. CODE Stroke was called at , NIH 2 and she received TPA at 1035. CTA/ CTP revealing thrombus distal to the L MCA Bifurication, 67ml of penumbra to left frontoparietal lobe. Pt transferred to [facility] for possible mechanical thrombectomy. Upon arrival, pt with improved symptoms, NIH 0. Pt does endorse recent sinus infection 2 weeks ago and completing abx course amoxicillin. Pt denies HA, dizziness, paresthesias, visual changes, difficulty with word finding, weakness.   Hospital course:  2/4/21- Aphasic ; R sided sensory loss- [facility]- 2  2/6/2121- Aphasic  and  decreased sensation RUE and LE-- NSCU admission  NIH =1 - aphasia                - Aphasia and RUE sensation deficit improved at augmented BP to > 135 mmHg while on neosynephrine (04 Feb 2021 15:09)    O/n events: none reported.  Labs, VS, imaging reviewed.    PHYSICAL EXAM: stable  No Acute Distress   Neurological: Awake, alert oriented to person, place and time, Following Commands, PERRL, EOMI, No Gaze Preference, Face Symmetrical, No dysmetria, No ataxia, No nystagmus, speech fluent, no dysarthria.  Motor exam: 5/5 x4, no drift.                                               Sensation: WNL    Pulmonary: Clear to Auscultation, No rales, No rhonchi, No wheezes     Cardiovascular: S1, S2, Regular rate and rhythm     Gastrointestinal: Soft, Non-tender, Non-distended     Extremities: No calf tenderness

## 2021-02-10 NOTE — PROGRESS NOTE ADULT - SUBJECTIVE AND OBJECTIVE BOX
[facility]                                                                                  [facility]                                                                                   [address] [place] [address]                                                                         (p) [phone]     (f) [phone]                                                                             Electrophysiology Note    HPI:  Cadence Mackey is a 60 year old woman with no significant past medical history who presented to [facility] with aphasia and right arm weakness on 2/4/21. She was in her usual state of health until she noticed that when talking to her  she had trouble speaking and could not move her right arm. She was taken to [facility] and received tPA. CTA/CT Perfusion revealed a thrombus distal to L MCA bifurcation with a penumbra and so she was transferred to [facility] for possible thrombectomy, but upon arrival her symptoms had resolved. She underwent full work up including MICHELE with no evidence of cause of stroke. 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 MICHELE was performed on 2/8/21 which did not show reason for CVA. Today the patient is doing well without any complaints. She denies having palpitations.    Interval Hx: No overnight events, pt denies complaint today.  Borderline BP ~ 88/50, denies dizziness, lightheadedness.  "My blood pressure is normally around 102-105"     TELE: sinus rhythm, no arrhythmias noted     MEDICATIONS  (STANDING):  apixaban 5 milliGRAM(s) Oral two times a day  aspirin  chewable 81 milliGRAM(s) Oral daily  atorvastatin 40 milliGRAM(s) Oral at bedtime  fluticasone propionate 50 MICROgram(s)/spray Nasal Spray 1 Spray(s) Both Nostrils two times a day  influenza   Vaccine 0.5 milliLiter(s) IntraMuscular once  lidocaine   Patch 1 Patch Transdermal daily  midodrine 15 milliGRAM(s) Oral every 8 hours    MEDICATIONS  (PRN):  acetaminophen   Tablet .. 650 milliGRAM(s) Oral every 6 hours PRN Temp greater or equal to 38C (100.4F), Mild Pain (1 - 3)  ALPRAZolam 0.25 milliGRAM(s) Oral every 8 hours PRN Anxiety    Allergies:  No Known Allergies    PAST MEDICAL & SURGICAL HISTORY:  No pertinent past medical history  No significant past surgical history    Vital Signs Last 24 Hrs  T(C): 36.6 (10 Feb 2021 07:36), Max: 37.1 (09 Feb 2021 16:21)  T(F): 97.9 (10 Feb 2021 07:36), Max: 98.8 (10 Feb 2021 04:19)  HR: 65 (10 Feb 2021 09:00) (42 - 79)  BP: 92/58 (10 Feb 2021 09:00) (68/29 - 129/59)  BP(mean): 67 (10 Feb 2021 09:00) (39 - 114)  RR: 14 (10 Feb 2021 09:00) (10 - 30)  SpO2: 95% (10 Feb 2021 09:00) (95% - 100%)    Physical Exam:  Constitutional: NAD, AAOx3  Cardiovascular: +S1S2 RRR  Pulmonary: CTA b/l, unlabored  GI: soft NTND +BS  Extremities: no pedal edema, +distal pulses b/l  Neuro: non focal, SEGURA x4    LABS:                        11.8   6.31  )-----------( 198      ( 10 Feb 2021 05:10 )             34.0     02-10    138  |  107  |  14.0  ----------------------------<  92  4.5   |  23.0  |  0.68    Ca    8.8      10 Feb 2021 05:10  Phos  3.8     02-10  Mg     2.1     02-10      PTT - ( 08 Feb 2021 14:51 )  PTT:56.6 sec      RADIOLOGY & ADDITIONAL TESTS:  2/8/21 MICHELE:  Summary:   1. Left ventricular ejection fraction, by visual estimation, is 60 to 65%.   2. Normal global left ventricular systolic function.   3. Normal RV size and function, estimated RVSP 27 mmHg.   4. Normal left atrial size.   5. Normal right atrial size. Prominent chiari network noted.   6. Color flow doppler and intravenous injection of agitated saline demonstrates the presence of an intact intra atrial septum. No evidence of PFO.   7. No left atrial appendage thrombus and normal left atrial appendage velocities.   8. Mild to moderate mitral valve regurgitation.   9. Mild aortic regurgitation.  10. Mild tricuspid regurgitation.  11. No evidence of atheroma at the aortic arch.  12. There is no evidence of pericardial effusion.  13. No obvious cardiogenic source of emboli.    < from: TTE Echo Limited or F/U (02.05.21 @ 17:21) >  Summary:   1. Technically good study.   2. Normal global left ventricular systolic function.   3. Left ventricular ejection fraction, by visual estimation, is 60 to 65%.   4. Normal left atrial size.   5. Normalright atrial size.   6. Mild mitral annular calcification.   7. Mild mitral valve regurgitation.   8. There is no evidence of pericardial effusion.    < from: MR Head No Cont (02.07.21 @ 12:09) >  IMPRESSION: Acute left MCA territory infarcts. Mild left frontal lobe and left parietal lobe subarachnoid hemorrhage. Follow-up head CT is recommended. Findings discussed with Dr. Thomas.    < from: MR Angio Neck w/wo IV Cont (02.07.21 @ 12:09) >  IMPRESSION: Occlusion of the left internal carotid artery seen.    Long segment of narrowing involving a dominant right vertebral artery seen proximally. This could be compatible with underlying dissection.    < from: CT Perfusion w/ Maps w/ IV Cont (02.06.21 @ 12:27) >  CBF<30%:0 ml  Tmax>6.0s: 60 mL  Mismatch volume: 60 mL  Mismatch ratio: Infinite.    IMPRESSION: CT perfusion as described above.    < from: CT Angio Neck w/ IV Cont (02.06.21 @ 10:38) >  IMPRESSION: CT angiogram of the neck appears normal.    Decreased flow related enhancement is seen involving the left M2 and M3 segments.    Assessment:  Cadence Mackey is a 60 year old woman with no significant past medical history who presented to [facility] with aphasia and right arm weakness on 2/4/21 found to have acute L MCA occlusion and acute L ICA occlusion with a hospital course complicated by R vertebral dissection.  In discussion with NSICU team, there does not appear to be a clear source for her thrombus and embolus.  There is no atherosclerotic disease and the L ICA occlusion does not appear to be due to dissection.  As such, it appears that the patient has cryptogenic stroke.  Telemetry monitoring does not reveal any evidence of arrhythmia.  As per CRYSTAL-AF trial, she would benefit with ILR implantation for long term detection of AF if no AF is seen during her hospitalization.  Remains in sinus rhythm on telemetry.      Recommendations:  - ILR implant before discharge  - Patient to remain on Apixaban for R vertebral artery dissection for a few months, but not lifelong

## 2021-02-10 NOTE — SPEECH LANGUAGE PATHOLOGY EVALUATION - SLP DIAGNOSIS
Receptive/expressive language and motor speech skills are WFL. Pt reports she is back to her baseline communication skills

## 2021-02-10 NOTE — SPEECH LANGUAGE PATHOLOGY EVALUATION - SLP PERTINENT HISTORY OF CURRENT PROBLEM
60 year old woman with no significant past medical history who presented to [facility] with aphasia and right arm weakness on 2/4/21. She was in her usual state of health until she noticed that when talking to her  she had trouble speaking and could not move her right arm. She was taken to [facility] and received tPA. CTA/CT Perfusion revealed a thrombus distal to L MCA bifurcation with a penumbra and so she was transferred to [facility] for possible thrombectomy, but upon arrival her symptoms had resolved. She underwent full work up including MICHELE with no evidence of cause of stroke. 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 MICHELE was performed on 2/8/21 which did not show reason for CVA. Pt now on Eliquis for dissection and midodrine for hypotension.

## 2021-02-10 NOTE — SPEECH LANGUAGE PATHOLOGY EVALUATION - COMMENTS
Receptive language WFL Parameters of motor speech function are WFL. Overall intelligibility is unremarkable. Expressive language WFL unremarkable Cognitive-linguistic skills appear WFL based on informal evaluation

## 2021-02-10 NOTE — PROGRESS NOTE ADULT - ASSESSMENT
60 year old woman with no significant past medical history who presented to [facility] with aphasia and right arm weakness on 2/4/21. She was in her usual state of health until she noticed that when talking to her  she had trouble speaking and could not move her right arm. She was taken to [facility] and received tPA. CTA/CT Perfusion revealed a thrombus distal to L MCA bifurcation with a penumbra and so she was transferred to [facility] for possible thrombectomy, but upon arrival her symptoms had resolved. She underwent full work up including MICHELE with no evidence of cause of stroke. 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 MICHELE was performed on 2/8/21 which did not show reason for CVA. Pt now on Eliquis for dissection and midodrine for hypotension. Pt now downgraded to medical service.     1) Acute left MCA infarct s/p TPA and mild left frontal lobe and left parietal lobe subarachnoid hemorrhage  - on statin and aspirin  - neurology following  - cardio following  - LR prior to DC per cardio    2) Right vertebral artery dissection  - on Eliquis    3) Hypotension  - on midodrine  - monitor BP and titrate     4) Anxiety  - xanax prn

## 2021-02-10 NOTE — PROGRESS NOTE ADULT - SUBJECTIVE AND OBJECTIVE BOX
[facility]                                        [facility]                                 Tomasa Recinos, & Noé                                  [address]                                        [place], [place], [address]                                             [phone]        CC: Stroke    HPI:   The patient is a 60y Female who presented as transfer from [facility] for stroke status post alteplase.  At 0920 she became acutely aphasic with right face and arm weakness/funny feeling.  She had NIH SS score of 2. She had head CT that did not show blood or large stroke and was given alteplase at 1035, CT-A head showed distal left MCA thrombus and CTP showed 67 cc penumbra. She was ultimately transferred to [facility] for further care.  On 2/6 she developed recurrence of expressive aphasia. Predominantly with word finding difficulty.   Vascular team present and code stroke was called.   She was transferred back to neuro-ICU.     Interim history: Downgraded out of ICU    Review of systems (neurology): Denies headache or dizziness. Denies weakness/numbness.  Denies speech/language deficits. Denies diplopia/blurred vision.  Denies confusion    MEDICATIONS  (STANDING):  apixaban 5 milliGRAM(s) Oral two times a day  aspirin  chewable 81 milliGRAM(s) Oral daily  atorvastatin 40 milliGRAM(s) Oral at bedtime  fluticasone propionate 50 MICROgram(s)/spray Nasal Spray 1 Spray(s) Both Nostrils two times a day  influenza   Vaccine 0.5 milliLiter(s) IntraMuscular once  lidocaine   Patch 1 Patch Transdermal daily  midodrine 15 milliGRAM(s) Oral every 8 hours    MEDICATIONS  (PRN):  acetaminophen   Tablet .. 650 milliGRAM(s) Oral every 6 hours PRN Temp greater or equal to 38C (100.4F), Mild Pain (1 - 3)  ALPRAZolam 0.25 milliGRAM(s) Oral every 8 hours PRN Anxiety      Vital Signs Last 24 Hrs  T(C): 36.8 (10 Feb 2021 11:56), Max: 37.1 (09 Feb 2021 16:21)  T(F): 98.2 (10 Feb 2021 11:56), Max: 98.8 (10 Feb 2021 04:19)  HR: 60 (10 Feb 2021 13:00) (42 - 72)  BP: 100/74 (10 Feb 2021 13:00) (88/60 - 129/59)  BP(mean): 84 (10 Feb 2021 13:00) (67 - 114)  RR: 14 (10 Feb 2021 13:00) (10 - 27)  SpO2: 98% (10 Feb 2021 13:00) (95% - 100%)    Detailed Neurologic Exam:    Mental status: The patient is awake and alert. There is no aphasia. She follows instructions well.     Cranial nerves: Pupils equal and react symmetrically to light. There is no visual field deficit to threat. Extraocular motion is full with no nystagmus. There is no ptosis. Facial sensation is intact. Facial musculature is symmetric. Palate elevates symmetrically. Tongue is midline.    Motor: There is normal bulk and tone.  There is no tremor.  Strength grossly 5/5 bilaterally.    Sensation: Grossly intact to light touch and pin.    Reflexes: 2+ throughout and plantar responses are flexor.    Cerebellar: No dysmetria on finger nose testing.    Labs:                           11.8   6.31  )-----------( 198      ( 10 Feb 2021 05:10 )             34.0     02-10    138  |  107  |  14.0  ----------------------------<  92  4.5   |  23.0  |  0.68    Ca    8.8      10 Feb 2021 05:10  Phos  3.8     02-10  Mg     2.1     02-10      Rad:     < from: MICHELE Echo Doppler (02.08.21 @ 16:00) >  Summary:   1. Left ventricular ejection fraction, by visual estimation, is 60 to 65%.   2. Normal global left ventricular systolic function.   3. Normal RV size and function, estimated RVSP 27 mmHg.   4. Normal left atrial size.   5. Normal right atrial size. Prominent chiari network noted.   6. Color flow doppler and intravenous injection of agitated saline demonstrates the presence of an intact intra atrial septum. No evidence of PFO.   7. No left atrial appendage thrombus and normal left atrial appendage velocities.   8. Mild to moderate mitral valve regurgitation.   9. Mild aortic regurgitation.  10. Mild tricuspid regurgitation.  11. No evidence of atheroma at the aortic arch.  12. There is no evidence of pericardial effusion.    13. No obvious cardiogenic source of emboli.

## 2021-02-10 NOTE — PROGRESS NOTE ADULT - SUBJECTIVE AND OBJECTIVE BOX
ANA WALLACE    [id number]    60y      Female    CC: CVA    INTERVAL HPI/OVERNIGHT EVENTS:  60 year old woman with no significant past medical history who presented to [facility] with aphasia and right arm weakness on 2/4/21. She was in her usual state of health until she noticed that when talking to her  she had trouble speaking and could not move her right arm. She was taken to [facility] and received tPA. CTA/CT Perfusion revealed a thrombus distal to L MCA bifurcation with a penumbra and so she was transferred to [facility] for possible thrombectomy, but upon arrival her symptoms had resolved. She underwent full work up including MICHELE with no evidence of cause of stroke. 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 MICHELE was performed on 2/8/21 which did not show reason for CVA. Pt now on Eliquis for dissection and midodrine for hypotension. Pt now downgraded to medical service.     Pt denies any sob, no weakness or numbness or sx at this time.    REVIEW OF SYSTEMS:  CONSTITUTIONAL: No fever or fatigue  RESPIRATORY: No cough, wheezing, No shortness of breath  CARDIOVASCULAR: No chest pain, palpitations  GASTROINTESTINAL: No abdominal or epigastric pain. No nausea, vomiting  NEUROLOGICAL: No headaches, memory loss, loss of strength.      Vital Signs Last 24 Hrs  T(C): 36.6 (10 Feb 2021 07:36), Max: 37.1 (09 Feb 2021 16:21)  T(F): 97.9 (10 Feb 2021 07:36), Max: 98.8 (10 Feb 2021 04:19)  HR: 60 (10 Feb 2021 11:00) (42 - 72)  BP: 99/59 (10 Feb 2021 11:00) (82/52 - 129/59)  BP(mean): 72 (10 Feb 2021 11:00) (61 - 114)  RR: 17 (10 Feb 2021 11:00) (10 - 27)  SpO2: 100% (10 Feb 2021 11:00) (95% - 100%)    PHYSICAL EXAM:  GENERAL: NAD, well-groomed  HEENT: PERRL, +EOMI  CHEST/LUNG: Clear to auscultation bilaterally; No wheezing  HEART: S1S2+, Regular rate and rhythm; No murmurs  ABDOMEN: Soft, Nontender, Nondistended; Bowel sounds present  EXTREMITIES:  2+ Peripheral Pulses, No clubbing, cyanosis, or edema  NEURO: AAOX3, no focal deficits, no motor r sensory loss  PSYCH: normal mood      LABS:                        11.8   6.31  )-----------( 198      ( 10 Feb 2021 05:10 )             34.0     02-10    138  |  107  |  14.0  ----------------------------<  92  4.5   |  23.0  |  0.68    Ca    8.8      10 Feb 2021 05:10  Phos  3.8     02-10  Mg     2.1     02-10      PTT - ( 08 Feb 2021 14:51 )  PTT:56.6 sec    MEDICATIONS  (STANDING):  apixaban 5 milliGRAM(s) Oral two times a day  aspirin  chewable 81 milliGRAM(s) Oral daily  atorvastatin 40 milliGRAM(s) Oral at bedtime  fluticasone propionate 50 MICROgram(s)/spray Nasal Spray 1 Spray(s) Both Nostrils two times a day  influenza   Vaccine 0.5 milliLiter(s) IntraMuscular once  lidocaine   Patch 1 Patch Transdermal daily  midodrine 15 milliGRAM(s) Oral every 8 hours    MEDICATIONS  (PRN):  acetaminophen   Tablet .. 650 milliGRAM(s) Oral every 6 hours PRN Temp greater or equal to 38C (100.4F), Mild Pain (1 - 3)  ALPRAZolam 0.25 milliGRAM(s) Oral every 8 hours PRN Anxiety

## 2021-02-10 NOTE — PROGRESS NOTE ADULT - ASSESSMENT
ASSESSMENT/PLAN:  60 year old female with no PMH presents with thrombus distal to L MCA bifurcation. Symptoms resolved s/p TPA.   Recurrence of Sx with NIH 1 on 2/6/21, resolved with BP augmentation to 135-150. Currently, off Levo with SBP 90-110s, neurologically intact.  Possible R vertebral dissection, aSx.  Small L frontal L parietal SAH on MR, not visualized on CTh.  Sinus bradycardia, reports as baseline; currently, likely contribution of Midodrine.    PLAN:  Neuro:  - q4 hr neuro checks   - cont Eliquis PO + ASA for vertebral dissection and L MCA occlusion/stroke   - HTN augmentation - on PO Midodrine, keep for now, off Levo >24 hrs now, tolerating well, no Sx although  with SBP mostly   - DAVIAN involved - no intervention for now, medical management recommended; will continue to follow as outpt.  - PT/OT/ Speech    CV:   -maintain normotension; borderline BP likely due to immobilization/deconditioning; would keep Midodrine for now and titrate prior to d/c  - LDL - goal 70 - atorvastatin 20mg  qday   -plan for ILR as w/up for stroke; MICHELE WNL - Cardiology involved    Respiratory:  -RA, keep SPO2 >92  -Incentive Spirometry    GI: diet as tolerated  Stool softeners     Heme:  AC as above, +SCDs    Endo:  monitor FS     ID: Chronic sinusitis   Flonase  q day  Afebrile    CODE STATUS:  [X] Full Code    DISPOSITION:  stable to be transferred to the floor, Medicine involvement appreciated    [X] Patient was  critically ill and monitor for recurrent stroke

## 2021-02-10 NOTE — PROGRESS NOTE ADULT - SUBJECTIVE AND OBJECTIVE BOX
[facility]                                                               Office: [address] [address]                                                              Telephone: [phone]. Fax:[phone]                                                                             PROGRESS NOTE  Reason for follow up: Recurrent embolic CVA  Overnight: No new events.   Update: off vasopressor on high dose midodrine however paradoxically SBP still 90-100s but denies any neurologic symptoms, telemetry remains in sinus      Review of symptoms:   Cardiac:  No chest pain. No dyspnea. No palpitations.  Respiratory: No cough. No dyspnea  Gastrointestinal: No diarrhea. No abdominal pain. No bleeding.     Past medical history: No updates.   	  Vital Signs Last 24 Hrs  T(C): 36.8 (02-10-21 @ 11:56), Max: 37.1 (02-09-21 @ 16:21)  T(F): 98.2 (02-10-21 @ 11:56), Max: 98.8 (02-10-21 @ 04:19)  HR: 60 (02-10-21 @ 11:00) (42 - 72)  BP: 99/59 (02-10-21 @ 11:00) (82/52 - 129/59)  BP(mean): 72 (02-10-21 @ 11:00) (61 - 114)  RR: 17 (02-10-21 @ 11:00) (10 - 27)  SpO2: 100% (02-10-21 @ 11:00) (95% - 100%)  I&O's Summary    09 Feb 2021 07:01  -  10 Feb 2021 07:00  --------------------------------------------------------  IN: 1886 mL / OUT: 2670 mL / NET: -784 mL    10 Feb 2021 07:01  -  10 Feb 2021 12:18  --------------------------------------------------------  IN: 240 mL / OUT: 225 mL / NET: 15 mL          PHYSICAL EXAM:  Appearance: Comfortable. No acute distress  HEENT:  Head and neck: Atraumatic. Normocephalic.  Normal oral mucosa, PERRL, Neck is supple. No JVD, No carotid bruit.   Neurologic: A&Ox 3, no focal deficits. EOMI, Cranial nerves are intact.  Lymphatic: No cervical lymphadenopathy  Cardiovascular: Normal S1 S2, No murmur, rubs/gallops. No JVD, No edema  Respiratory: Lungs clear to auscultation  Gastrointestinal:  Soft, Non-tender, + BS  Lower Extremities: No edema  Psychiatry: Patient is calm. No agitation. Mood & affect appropriate  Skin: No rashes/ecchymoses/cyanosis/ulcers visualized on the face, hands or feet.      CURRENT MEDICATIONS:  MEDICATIONS  (STANDING):  apixaban 5 milliGRAM(s) Oral two times a day  aspirin  chewable 81 milliGRAM(s) Oral daily  atorvastatin 40 milliGRAM(s) Oral at bedtime  fluticasone propionate 50 MICROgram(s)/spray Nasal Spray 1 Spray(s) Both Nostrils two times a day  influenza   Vaccine 0.5 milliLiter(s) IntraMuscular once  lidocaine   Patch 1 Patch Transdermal daily  midodrine 15 milliGRAM(s) Oral every 8 hours    MEDICATIONS  (PRN):  acetaminophen   Tablet .. 650 milliGRAM(s) Oral every 6 hours PRN Temp greater or equal to 38C (100.4F), Mild Pain (1 - 3)  ALPRAZolam 0.25 milliGRAM(s) Oral every 8 hours PRN Anxiety      DIAGNOSTIC TESTING:  t< from: MICHELE Echo Doppler (02.08.21 @ 16:00) >  Summary:   1. Left ventricular ejection fraction, by visual estimation, is 60 to 65%.   2. Normal global left ventricular systolic function.   3. Normal RV size and function, estimated RVSP 27 mmHg.   4. Normal left atrial size.   5. Normal right atrial size. Prominent chiari network noted.   6. Color flow doppler and intravenous injection of agitated saline demonstrates the presence of an intact intra atrial septum. No evidence of PFO.   7. No left atrial appendage thrombus and normal left atrial appendage velocities.   8. Mild to moderate mitral valve regurgitation.   9. Mild aortic regurgitation.  10. Mild tricuspid regurgitation.  11. No evidence of atheroma at the aortic arch.  12. There is no evidence of pericardial effusion.  13. No obvious cardiogenic source of emboli.    < end of copied text >      Labs:                        11.8   6.31  )-----------( 198      ( 10 Feb 2021 05:10 )             34.0     02-10    138  |  107  |  14.0  ----------------------------<  92  4.5   |  23.0  |  0.68    Ca    8.8      10 Feb 2021 05:10  Phos  3.8     02-10  Mg     2.1     02-10            Cholesterol 169 mg/dL; Direct LDL --; HDL Cholesterol, Serum 64 mg/dL; HDL/ Total Cholesterol Ratio Measurement --; Total Cholesterol/ HDL Ratio Measurement --; Triglycerides, Serum 62 mg/dL  A1C with Estimated Average Glucose Result: 5.1 % (02-05-21 @ 06:44)      TELEMETRY: Sinus 60s

## 2021-02-10 NOTE — PROGRESS NOTE ADULT - ASSESSMENT
59 y/o F with no PMHx, 3-5K runner daily, presents to  with aphasia, R arm weekend, Code stroke called- pt administered TPA at that tiem. CTA shows L MCA stroke. Pt transferred to [facility] for possible mechanical throbectomy. Symptoms have since resolved. Pt follows with Dr. Lockhart- cardiologist . Last stress test 2016-normal. TTE- "leaky valve, ut normal". former smoker, social drinker. Tele- SB today, patient states she is an avid runner 3K/day    2/9- patient sitting in chair asymptomatic. remains on norepinephrine for BP control. MICHELE nml. EP eval for ILR on DC.  2/10- on midodrine 15mg q8rhs to augment SBP per neurosurgery given ICA occlusion, however SBP still 90-100s         Recurrent CVA- suspected embolic source  - CTA neck L- ICA occlusion  - CTA head- L MCA thrombus  - MRA vertebral artery dissection?  - Tele- no arrythmia - SB/SR, no ectopy - given hx of avid runner maybe a normal variant and now on high dose midodrine.    - MICHELE no cardioembolic source, no PFO, appendage thrombus or aortic arch atheroma  - given suspicion for embolic source planning for ILR implant prior to discharge to monitor for occult pAfib, f/u EPS   - continue ASA 81mg, atorvastatin 40mg (LDL 93), on anticoagulation plan for 6 months duration? need hypercoagulable workup as outpatient once off AC  - clarify with neurosurgery regarding duration of midodrine need for SBP goal      General cardiology will sign off, reconsult if any new cardiac issues arise  Updated patient's outside cardiologist (Dr. Rafita Lockhart) for discharge follow up      Juan Chaudhry DO, [facility]  Faculty Non-Invasive Cardiologist  [phone]

## 2021-02-11 PROCEDURE — 99232 SBSQ HOSP IP/OBS MODERATE 35: CPT

## 2021-02-11 RX ORDER — MIDODRINE HYDROCHLORIDE 2.5 MG/1
10 TABLET ORAL EVERY 8 HOURS
Refills: 0 | Status: DISCONTINUED | OUTPATIENT
Start: 2021-02-11 | End: 2021-02-12

## 2021-02-11 RX ADMIN — LIDOCAINE 1 PATCH: 4 CREAM TOPICAL at 12:28

## 2021-02-11 RX ADMIN — APIXABAN 5 MILLIGRAM(S): 2.5 TABLET, FILM COATED ORAL at 05:35

## 2021-02-11 RX ADMIN — ATORVASTATIN CALCIUM 40 MILLIGRAM(S): 80 TABLET, FILM COATED ORAL at 22:43

## 2021-02-11 RX ADMIN — MIDODRINE HYDROCHLORIDE 15 MILLIGRAM(S): 2.5 TABLET ORAL at 12:28

## 2021-02-11 RX ADMIN — Medication 81 MILLIGRAM(S): at 12:28

## 2021-02-11 RX ADMIN — MIDODRINE HYDROCHLORIDE 15 MILLIGRAM(S): 2.5 TABLET ORAL at 05:34

## 2021-02-11 RX ADMIN — LIDOCAINE 1 PATCH: 4 CREAM TOPICAL at 23:55

## 2021-02-11 RX ADMIN — APIXABAN 5 MILLIGRAM(S): 2.5 TABLET, FILM COATED ORAL at 17:38

## 2021-02-11 RX ADMIN — MIDODRINE HYDROCHLORIDE 10 MILLIGRAM(S): 2.5 TABLET ORAL at 22:43

## 2021-02-11 NOTE — PROGRESS NOTE ADULT - PROVIDER SPECIALTY LIST ADULT
NSICU
NSICU
Neurology
Cardiology
Cardiology
Hospitalist
Hospitalist
Intervent Radiology
NSICU
Neurology
Cardiology
Cardiology
Electrophysiology
Hospitalist
Intervent Radiology
NSICU
Neurology
Cardiology
NSICU
NSICU
Neurology
Hospitalist
Neurology
NSICU

## 2021-02-11 NOTE — PROGRESS NOTE ADULT - ASSESSMENT
60y/oF no significant PMH who presented to [facility] with aphasia and RUE weakness 2/4/21. Received tPA. CTA/CT perfusion scan revealed thrombus distal to L MCA bifurcation with a penumbra so was transferred to [facility] for possible thrombectomy but upon arrival, symptoms had resolved. 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 MICHELE performed 2/8/21, which was normal. EP called to evaluate for ILR. Pt now on Eliquis for dissection and midodrine for hypotension. Downgraded to medical service 2/10/21.     Acute L MCA infarct s/p tPA       60y/oF no significant PMH who presented to [facility] with aphasia and RUE weakness 2/4/21. Received tPA. CTA/CT perfusion scan revealed thrombus distal to L MCA bifurcation with a penumbra so was transferred to [facility] for possible thrombectomy but upon arrival, symptoms had resolved. 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 MICHELE performed 2/8/21, which was normal. EP called to evaluate for ILR. Pt now on Eliquis for dissection and midodrine for hypotension. Downgraded to medical service 2/10/21.     Acute L MCA infarct s/p tPA   L frontal lobe, L parietal lobe subarachnoid hemorrhage   -s/p tPA   -cont statin, asa  -n    60y/oF no significant PMH who presented to [facility] with aphasia and RUE weakness 2/4/21. Received tPA. CTA/CT perfusion scan revealed thrombus distal to L MCA bifurcation with a penumbra so was transferred to [facility] for possible thrombectomy but upon arrival, symptoms had resolved. 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 MICHELE performed 2/8/21, which was normal. EP called to evaluate for ILR. Pt now on Eliquis for dissection and midodrine for hypotension. Downgraded to medical service 2/10/21.     Acute L MCA infarct s/p tPA   L frontal lobe, L parietal lobe subarachnoid hemorrhage   R vertebral artery dissection   Hypotension  -s/p tPA   -cont statin, asa  -s/p MICHELE: no cardioembolic source, no PFO, appendage thrombus or aortic arch atheroma  -cardio recs appreciated   -pt to f/u Dr. Rafita Lockhart as outpt  -cont Eliquis (likely 3-6 months) and asa   -neurosx recs appreciated  -neurology recs appreciated   -wean midodrine as able, though need to avoid hypotension for adequate cerebral perfusion   -PT/OT evals appreciated: rec home with outpt services   -speech/swallow evals appreciated  -needs ILR prior to discharge        dispo: home w/outpt services pending ILR placement poss next 48-72hrs

## 2021-02-11 NOTE — PROGRESS NOTE ADULT - ASSESSMENT
60y Female who is followed by neurology because of stroke.    Stroke.   Left hemisphere symptoms.   Has occluded left ICA and right vertebral artery dissection  continue eliquis (likely 3-6 months) and ASA  continue lipitor  avoid hypotension to ensure adequate cerebral perfusion- continue midrodrine  will need ILR prior to discharge- appreciate electrophysiology input    will follow with you    Sheldon Randolph MD PhD   [id number]

## 2021-02-11 NOTE — CHART NOTE - NSCHARTNOTEFT_GEN_A_CORE
Telemetry Review, past 24 hrs:   sinus rhythm 60's w/sinus bradycardia 50's overnight, rare PVCs, no atrial arrhythmias noted     Recommendations:  - ILR implant prior to d/c   - Patient to remain on Apixaban for R vertebral artery dissection for a few months, but not lifelong

## 2021-02-11 NOTE — PROGRESS NOTE ADULT - SUBJECTIVE AND OBJECTIVE BOX
ANA WALLACE    [id number]    60y      Female    CC: CVA    INTERVAL HPI/OVERNIGHT EVENTS: Pt seen and examined. OOB to chair. No complaints. Denies HA, dizziness, CP, SOB, abd pain, n/v    REVIEW OF SYSTEMS:    CONSTITUTIONAL: No fever, weight loss  RESPIRATORY: No cough, wheezing, hemoptysis; No shortness of breath  CARDIOVASCULAR: No chest pain, palpitations  GASTROINTESTINAL: No abdominal or epigastric pain. No nausea, vomiting  NEUROLOGICAL: No headaches    Vital Signs Last 24 Hrs  T(C): 37.2 (11 Feb 2021 08:28), Max: 37.2 (11 Feb 2021 08:28)  T(F): 98.9 (11 Feb 2021 08:28), Max: 98.9 (11 Feb 2021 08:28)  HR: 63 (11 Feb 2021 08:28) (63 - 75)  BP: 110/74 (11 Feb 2021 08:28) (100/63 - 122/77)  BP(mean): --  RR: 20 (11 Feb 2021 08:28) (16 - 20)  SpO2: 97% (11 Feb 2021 08:28) (97% - 100%)    PHYSICAL EXAM:    GENERAL: NAD  HEENT: PERRL, +EOMI  NECK: soft, supple  CHEST/LUNG: Clear to auscultation bilaterally; No wheezing  HEART: S1S2+, Regular rate and rhythm; No murmurs, rubs, or gallops  ABDOMEN: Soft, Nontender, Nondistended; Bowel sounds present  SKIN: warm, dry  NEURO: AAOX3; 5/5 strength b/l upper and lower extremities   PSYCH: normal affect     LABS:                        11.8   6.31  )-----------( 198      ( 10 Feb 2021 05:10 )             34.0     02-10    138  |  107  |  14.0  ----------------------------<  92  4.5   |  23.0  |  0.68    Ca    8.8      10 Feb 2021 05:10  Phos  3.8     02-10  Mg     2.1     02-10              MEDICATIONS  (STANDING):  apixaban 5 milliGRAM(s) Oral two times a day  aspirin  chewable 81 milliGRAM(s) Oral daily  atorvastatin 40 milliGRAM(s) Oral at bedtime  fluticasone propionate 50 MICROgram(s)/spray Nasal Spray 1 Spray(s) Both Nostrils two times a day  influenza   Vaccine 0.5 milliLiter(s) IntraMuscular once  lidocaine   Patch 1 Patch Transdermal daily  midodrine 15 milliGRAM(s) Oral every 8 hours    MEDICATIONS  (PRN):  acetaminophen   Tablet .. 650 milliGRAM(s) Oral every 6 hours PRN Temp greater or equal to 38C (100.4F), Mild Pain (1 - 3)  ALPRAZolam 0.25 milliGRAM(s) Oral every 8 hours PRN Anxiety      RADIOLOGY & ADDITIONAL TESTS:

## 2021-02-11 NOTE — PROGRESS NOTE ADULT - SUBJECTIVE AND OBJECTIVE BOX
[facility]                                        Neurology at [place]                                 Tomasa Recinos, & Noé                                  [address]                                        [place], [place], [address]                                             [phone]        CC: Stroke    HPI:   The patient is a 60y Female who presented as transfer from [facility] for stroke status post alteplase.  At 0920 she became acutely aphasic with right face and arm weakness/funny feeling.  She had NIH SS score of 2. She had head CT that did not show blood or large stroke and was given alteplase at 1035, CT-A head showed distal left MCA thrombus and CTP showed 67 cc penumbra. She was ultimately transferred to [facility] for further care.  On 2/6 she developed recurrence of expressive aphasia. Predominantly with word finding difficulty.   Vascular team present and code stroke was called.   She was transferred back to neuro-ICU.     Interim history: Downgraded out of ICU, no new issues, speech/language intact    Review of systems (neurology): Denies headache or dizziness. Denies weakness/numbness.  Denies speech/language deficits. Denies diplopia/blurred vision.  Denies confusion    MEDICATIONS  (STANDING):  apixaban 5 milliGRAM(s) Oral two times a day  aspirin  chewable 81 milliGRAM(s) Oral daily  atorvastatin 40 milliGRAM(s) Oral at bedtime  fluticasone propionate 50 MICROgram(s)/spray Nasal Spray 1 Spray(s) Both Nostrils two times a day  influenza   Vaccine 0.5 milliLiter(s) IntraMuscular once  lidocaine   Patch 1 Patch Transdermal daily  midodrine 15 milliGRAM(s) Oral every 8 hours    MEDICATIONS  (PRN):  acetaminophen   Tablet .. 650 milliGRAM(s) Oral every 6 hours PRN Temp greater or equal to 38C (100.4F), Mild Pain (1 - 3)  ALPRAZolam 0.25 milliGRAM(s) Oral every 8 hours PRN Anxiety      Vital Signs Last 24 Hrs  T(C): 37.2 (11 Feb 2021 08:28), Max: 37.2 (11 Feb 2021 08:28)  T(F): 98.9 (11 Feb 2021 08:28), Max: 98.9 (11 Feb 2021 08:28)  HR: 63 (11 Feb 2021 08:28) (63 - 75)  BP: 110/74 (11 Feb 2021 08:28) (100/63 - 122/77)  BP(mean): --  RR: 20 (11 Feb 2021 08:28) (16 - 20)  SpO2: 97% (11 Feb 2021 08:28) (97% - 100%)    Detailed Neurologic Exam:    Mental status: The patient is awake and alert. There is no aphasia. She follows instructions well.     Cranial nerves: Pupils equal and react symmetrically to light. There is no visual field deficit to threat. Extraocular motion is full with no nystagmus. There is no ptosis. Facial sensation is intact. Facial musculature is symmetric. Palate elevates symmetrically. Tongue is midline.    Motor: There is normal bulk and tone.  There is no tremor.  Strength grossly 5/5 bilaterally.    Sensation: Grossly intact to light touch and pin.    Reflexes: 2+ throughout and plantar responses are flexor.    Cerebellar: No dysmetria on finger nose testing.    Labs:                     11.8   6.31  )-----------( 198      ( 10 Feb 2021 05:10 )             34.0     02-10    138  |  107  |  14.0  ----------------------------<  92  4.5   |  23.0  |  0.68    Ca    8.8      10 Feb 2021 05:10  Phos  3.8     02-10  Mg     2.1     02-10      Rad:     < from: MICHELE Echo Doppler (02.08.21 @ 16:00) >  Summary:   1. Left ventricular ejection fraction, by visual estimation, is 60 to 65%.   2. Normal global left ventricular systolic function.   3. Normal RV size and function, estimated RVSP 27 mmHg.   4. Normal left atrial size.   5. Normal right atrial size. Prominent chiari network noted.   6. Color flow doppler and intravenous injection of agitated saline demonstrates the presence of an intact intra atrial septum. No evidence of PFO.   7. No left atrial appendage thrombus and normal left atrial appendage velocities.   8. Mild to moderate mitral valve regurgitation.   9. Mild aortic regurgitation.  10. Mild tricuspid regurgitation.  11. No evidence of atheroma at the aortic arch.  12. There is no evidence of pericardial effusion.  13. No obvious cardiogenic source of emboli.

## 2021-02-11 NOTE — PROGRESS NOTE ADULT - REASON FOR ADMISSION
stroke
DISPLAY PLAN FREE TEXT

## 2021-02-12 ENCOUNTER — TRANSCRIPTION ENCOUNTER (OUTPATIENT)
Age: 61
End: 2021-02-12

## 2021-02-12 VITALS
SYSTOLIC BLOOD PRESSURE: 113 MMHG | OXYGEN SATURATION: 98 % | DIASTOLIC BLOOD PRESSURE: 76 MMHG | HEART RATE: 76 BPM | RESPIRATION RATE: 17 BRPM | TEMPERATURE: 98 F

## 2021-02-12 LAB
ANION GAP SERPL CALC-SCNC: 12 MMOL/L — SIGNIFICANT CHANGE UP (ref 5–17)
BUN SERPL-MCNC: 12 MG/DL — SIGNIFICANT CHANGE UP (ref 8–20)
CALCIUM SERPL-MCNC: 9.2 MG/DL — SIGNIFICANT CHANGE UP (ref 8.6–10.2)
CHLORIDE SERPL-SCNC: 106 MMOL/L — SIGNIFICANT CHANGE UP (ref 98–107)
CO2 SERPL-SCNC: 24 MMOL/L — SIGNIFICANT CHANGE UP (ref 22–29)
CREAT SERPL-MCNC: 0.74 MG/DL — SIGNIFICANT CHANGE UP (ref 0.5–1.3)
GLUCOSE SERPL-MCNC: 109 MG/DL — HIGH (ref 70–99)
HCT VFR BLD CALC: 34 % — LOW (ref 34.5–45)
HGB BLD-MCNC: 11.7 G/DL — SIGNIFICANT CHANGE UP (ref 11.5–15.5)
MAGNESIUM SERPL-MCNC: 2.1 MG/DL — SIGNIFICANT CHANGE UP (ref 1.6–2.6)
MCHC RBC-ENTMCNC: 32 PG — SIGNIFICANT CHANGE UP (ref 27–34)
MCHC RBC-ENTMCNC: 34.4 GM/DL — SIGNIFICANT CHANGE UP (ref 32–36)
MCV RBC AUTO: 92.9 FL — SIGNIFICANT CHANGE UP (ref 80–100)
PLATELET # BLD AUTO: 234 K/UL — SIGNIFICANT CHANGE UP (ref 150–400)
POTASSIUM SERPL-MCNC: 4.6 MMOL/L — SIGNIFICANT CHANGE UP (ref 3.5–5.3)
POTASSIUM SERPL-SCNC: 4.6 MMOL/L — SIGNIFICANT CHANGE UP (ref 3.5–5.3)
RBC # BLD: 3.66 M/UL — LOW (ref 3.8–5.2)
RBC # FLD: 11.7 % — SIGNIFICANT CHANGE UP (ref 10.3–14.5)
SODIUM SERPL-SCNC: 142 MMOL/L — SIGNIFICANT CHANGE UP (ref 135–145)
WBC # BLD: 5.89 K/UL — SIGNIFICANT CHANGE UP (ref 3.8–10.5)
WBC # FLD AUTO: 5.89 K/UL — SIGNIFICANT CHANGE UP (ref 3.8–10.5)

## 2021-02-12 PROCEDURE — 80053 COMPREHEN METABOLIC PANEL: CPT

## 2021-02-12 PROCEDURE — C1764: CPT

## 2021-02-12 PROCEDURE — 82533 TOTAL CORTISOL: CPT

## 2021-02-12 PROCEDURE — 84443 ASSAY THYROID STIM HORMONE: CPT

## 2021-02-12 PROCEDURE — 86769 SARS-COV-2 COVID-19 ANTIBODY: CPT

## 2021-02-12 PROCEDURE — 92523 SPEECH SOUND LANG COMPREHEN: CPT

## 2021-02-12 PROCEDURE — 94640 AIRWAY INHALATION TREATMENT: CPT

## 2021-02-12 PROCEDURE — 70498 CT ANGIOGRAPHY NECK: CPT

## 2021-02-12 PROCEDURE — 86803 HEPATITIS C AB TEST: CPT

## 2021-02-12 PROCEDURE — 83036 HEMOGLOBIN GLYCOSYLATED A1C: CPT

## 2021-02-12 PROCEDURE — 70551 MRI BRAIN STEM W/O DYE: CPT

## 2021-02-12 PROCEDURE — 71260 CT THORAX DX C+: CPT

## 2021-02-12 PROCEDURE — 97116 GAIT TRAINING THERAPY: CPT

## 2021-02-12 PROCEDURE — 93312 ECHO TRANSESOPHAGEAL: CPT

## 2021-02-12 PROCEDURE — 33285 INSJ SUBQ CAR RHYTHM MNTR: CPT

## 2021-02-12 PROCEDURE — 80048 BASIC METABOLIC PNL TOTAL CA: CPT

## 2021-02-12 PROCEDURE — 82962 GLUCOSE BLOOD TEST: CPT

## 2021-02-12 PROCEDURE — 84100 ASSAY OF PHOSPHORUS: CPT

## 2021-02-12 PROCEDURE — 97163 PT EVAL HIGH COMPLEX 45 MIN: CPT

## 2021-02-12 PROCEDURE — 0042T: CPT

## 2021-02-12 PROCEDURE — 85730 THROMBOPLASTIN TIME PARTIAL: CPT

## 2021-02-12 PROCEDURE — 99239 HOSP IP/OBS DSCHRG MGMT >30: CPT

## 2021-02-12 PROCEDURE — 36415 COLL VENOUS BLD VENIPUNCTURE: CPT

## 2021-02-12 PROCEDURE — 93975 VASCULAR STUDY: CPT

## 2021-02-12 PROCEDURE — 97530 THERAPEUTIC ACTIVITIES: CPT

## 2021-02-12 PROCEDURE — 93325 DOPPLER ECHO COLOR FLOW MAPG: CPT

## 2021-02-12 PROCEDURE — 80061 LIPID PANEL: CPT

## 2021-02-12 PROCEDURE — 97167 OT EVAL HIGH COMPLEX 60 MIN: CPT

## 2021-02-12 PROCEDURE — 74177 CT ABD & PELVIS W/CONTRAST: CPT

## 2021-02-12 PROCEDURE — 70496 CT ANGIOGRAPHY HEAD: CPT

## 2021-02-12 PROCEDURE — 70450 CT HEAD/BRAIN W/O DYE: CPT

## 2021-02-12 PROCEDURE — 83735 ASSAY OF MAGNESIUM: CPT

## 2021-02-12 PROCEDURE — 93320 DOPPLER ECHO COMPLETE: CPT

## 2021-02-12 PROCEDURE — 97535 SELF CARE MNGMENT TRAINING: CPT

## 2021-02-12 PROCEDURE — 93005 ELECTROCARDIOGRAM TRACING: CPT

## 2021-02-12 PROCEDURE — 93308 TTE F-UP OR LMTD: CPT

## 2021-02-12 PROCEDURE — 85027 COMPLETE CBC AUTOMATED: CPT

## 2021-02-12 PROCEDURE — 70549 MR ANGIOGRAPH NECK W/O&W/DYE: CPT

## 2021-02-12 RX ORDER — ATORVASTATIN CALCIUM 80 MG/1
1 TABLET, FILM COATED ORAL
Qty: 30 | Refills: 0
Start: 2021-02-12 | End: 2021-03-13

## 2021-02-12 RX ORDER — LIDOCAINE 4 G/100G
1 CREAM TOPICAL
Qty: 10 | Refills: 0
Start: 2021-02-12 | End: 2021-02-21

## 2021-02-12 RX ORDER — APIXABAN 2.5 MG/1
1 TABLET, FILM COATED ORAL
Qty: 60 | Refills: 0
Start: 2021-02-12 | End: 2021-03-13

## 2021-02-12 RX ORDER — ASCORBIC ACID 60 MG
1 TABLET,CHEWABLE ORAL
Qty: 0 | Refills: 0 | DISCHARGE

## 2021-02-12 RX ORDER — ASPIRIN/CALCIUM CARB/MAGNESIUM 324 MG
1 TABLET ORAL
Qty: 30 | Refills: 0
Start: 2021-02-12 | End: 2021-03-13

## 2021-02-12 RX ORDER — MIDODRINE HYDROCHLORIDE 2.5 MG/1
1 TABLET ORAL
Qty: 42 | Refills: 0
Start: 2021-02-12 | End: 2021-02-25

## 2021-02-12 RX ORDER — CEFAZOLIN SODIUM 1 G
2000 VIAL (EA) INJECTION ONCE
Refills: 0 | Status: COMPLETED | OUTPATIENT
Start: 2021-02-12 | End: 2021-02-12

## 2021-02-12 RX ADMIN — Medication 650 MILLIGRAM(S): at 11:56

## 2021-02-12 RX ADMIN — MIDODRINE HYDROCHLORIDE 10 MILLIGRAM(S): 2.5 TABLET ORAL at 06:18

## 2021-02-12 RX ADMIN — APIXABAN 5 MILLIGRAM(S): 2.5 TABLET, FILM COATED ORAL at 06:18

## 2021-02-12 RX ADMIN — Medication 100 MILLIGRAM(S): at 10:30

## 2021-02-12 RX ADMIN — Medication 1 SPRAY(S): at 06:18

## 2021-02-12 RX ADMIN — Medication 81 MILLIGRAM(S): at 09:13

## 2021-02-12 RX ADMIN — MIDODRINE HYDROCHLORIDE 10 MILLIGRAM(S): 2.5 TABLET ORAL at 13:48

## 2021-02-12 RX ADMIN — LIDOCAINE 1 PATCH: 4 CREAM TOPICAL at 09:13

## 2021-02-12 NOTE — DISCHARGE NOTE PROVIDER - PROVIDER TOKENS
PROVIDER:[TOKEN:[[id number]],FOLLOWUP:[1 week]] PROVIDER:[TOKEN:[[id number]],FOLLOWUP:[1 week]],PROVIDER:[TOKEN:[[id number]]] PROVIDER:[TOKEN:[[id number]],FOLLOWUP:[1 week]],PROVIDER:[TOKEN:[[id number]]],PROVIDER:[TOKEN:[[id number]]],FREE:[LAST:[PMD],PHONE:[(   )    -],FAX:[(   )    -]]

## 2021-02-12 NOTE — PROVIDER CONTACT NOTE (OTHER) - BACKGROUND
pt was getting IV ABX for pre-op Loop recorder they just finished
Pt admitted for cerebral infarction.

## 2021-02-12 NOTE — DISCHARGE NOTE PROVIDER - CARE PROVIDER_API CALL
Brandon Lokchart)  Cardiovascular Disease; Internal Medicine  [address], [address]  [place] [address]  Phone: [phone]  Fax: [phone]  Follow Up Time: 1 week   Brandon Lockhart)  Cardiovascular Disease; Internal Medicine  [address], [address]  [place] [address]  Phone: [phone]  Fax: [phone]  Follow Up Time: 1 week    Bora Drake)  Cardiology; Internal Medicine  [address], [address]  [place] [address]  Phone: [phone]  Fax: [phone]  Follow Up Time:    Brandon Lockhart)  Cardiovascular Disease; Internal Medicine  [address], [address]  [place] [address]  Phone: [phone]  Fax: [phone]  Follow Up Time: 1 week    Bora Drake)  Cardiology; Internal Medicine  [address], [address]  [place] [address]  Phone: [phone]  Fax: [phone]  Follow Up Time:     Sheldon Randolph; PhD)  Neurology; Vascular Neurology  [address], [address]  [place] [address]  Phone: [phone]  Fax: [phone]  Follow Up Time:     PMD,   Phone: (   )    -  Fax: (   )    -  Follow Up Time:

## 2021-02-12 NOTE — PROVIDER CONTACT NOTE (OTHER) - ACTION/TREATMENT ORDERED:
Heparin held. Repeat aptt sent.
Continue to monitor     PRN xanax for anxiety if continues.. after loop recorder procedure

## 2021-02-12 NOTE — DISCHARGE NOTE PROVIDER - NSDCMRMEDTOKEN_GEN_ALL_CORE_FT
Augmentin 875 mg-125 mg oral tablet: 1 tab(s) orally every 12 hours for 10 days, patient completed 9 out of 10 days for sinus infection  collagen: 1 cap(s) orally once a day  Flonase 50 mcg/inh nasal spray: 1 spray(s) nasal 2 times a day  magnesium: 1 tab(s) orally once a day  Vitamin C 1000 mg oral tablet: 1 tab(s) orally once a day   apixaban 5 mg oral tablet: 1 tab(s) orally 2 times a day  aspirin 81 mg oral tablet, chewable: 1 tab(s) orally once a day  atorvastatin 40 mg oral tablet: 1 tab(s) orally once a day (at bedtime)  Flonase 50 mcg/inh nasal spray: 1 spray(s) nasal 2 times a day  lidocaine 5% topical film: Apply topically to affected area once a day   midodrine 10 mg oral tablet: 1 tab(s) orally every 8 hours

## 2021-02-12 NOTE — DISCHARGE NOTE PROVIDER - HOSPITAL COURSE
60y/oF no significant PMH who presented to [facility] with aphasia and RUE weakness 2/4/21. Received tPA. CTA/CT perfusion scan revealed thrombus distal to L MCA bifurcation with a penumbra so was transferred to [facility] for possible thrombectomy but upon arrival, symptoms had resolved. 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 MICHELE performed 2/8/21, which was normal. EP called to evaluate for ILR. Pt now on Eliquis for dissection and midodrine for hypotension. Downgraded to medical service 2/10/21. Pt stable. s/p ILR placement 2/12. Stable for dc home.     Acute L MCA infarct s/p tPA   L frontal lobe, L parietal lobe subarachnoid hemorrhage   R vertebral artery dissection   Hypotension  -s/p tPA   -cont statin, asa  -s/p MICHELE: no cardioembolic source, no PFO, appendage thrombus or aortic arch atheroma  -cardio recs appreciated   -pt to f/u Dr. Rafita Lockhart as outpt  -cont Eliquis (likely 3-6 months) and asa   -neurosx recs appreciated  -neurology recs appreciated   -wean midodrine as able, though need to avoid hypotension for adequate cerebral perfusion   -PT/OT evals appreciated: rec home with outpt services   -speech/swallow evals appreciated    Vital Signs Last 24 Hrs  T(C): 37 (12 Feb 2021 11:00), Max: 37 (12 Feb 2021 11:00)  T(F): 98.6 (12 Feb 2021 11:00), Max: 98.6 (12 Feb 2021 11:00)  HR: 68 (12 Feb 2021 11:00) (58 - 68)  BP: 134/98 (12 Feb 2021 11:00) (104/69 - 134/98)  BP(mean): 101 (12 Feb 2021 11:00) (101 - 101)  RR: 17 (12 Feb 2021 11:00) (17 - 18)  SpO2: 98% (12 Feb 2021 11:00) (96% - 100%)    GENERAL: NAD  HEENT: PERRL, +EOMI  NECK: soft, supple  CHEST/LUNG: Clear to auscultation bilaterally; No wheezing  HEART: S1S2+, Regular rate and rhythm; No murmurs, rubs, or gallops  ABDOMEN: Soft, Nontender, Nondistended; Bowel sounds present  SKIN: warm, dry  NEURO: AAOX3; 5/5 strength b/l upper and lower extremities   PSYCH: normal affect     33minutes spent on discharge

## 2021-02-12 NOTE — DISCHARGE NOTE PROVIDER - NSDCCPTREATMENT_GEN_ALL_CORE_FT
PRINCIPAL PROCEDURE  Procedure: Insertion, loop recorder  Findings and Treatment: [facility] will call you to schedule a 2 week post op follow up, please call [phone] if you dont hear from them or need to be seen sooner.  Loop Recorder Incision Care:     - Do not touch the incision until it is completely healed.   - There is Dermabond (skin glue) on your incision, which will start to flake off on its own over the next 2-3 weeks. Do not pick at or peel off the Dermabond.   - Do not apply soaps, creams, lotions, ointments or powders to the incision until it is completely healed.  - You should call the doctor if you notice redness, drainage, swelling, increased tenderness, hot sensation around the  incision, bleeding or incision edges pulling apart.

## 2021-02-12 NOTE — DISCHARGE NOTE PROVIDER - CARE PROVIDERS DIRECT ADDRESSES
,DirectAddress_Unknown ,DirectAddress_Unknown,DirectAddress_Unknown ,DirectAddress_Unknown,DirectAddress_Unknown,[email or web address],DirectAddress_Unknown

## 2021-02-12 NOTE — DISCHARGE NOTE PROVIDER - NSDCCPCAREPLAN_GEN_ALL_CORE_FT
PRINCIPAL DISCHARGE DIAGNOSIS  Diagnosis: CVA (cerebral vascular accident)  Assessment and Plan of Treatment:

## 2021-02-12 NOTE — PROVIDER CONTACT NOTE (OTHER) - ASSESSMENT
Pt resting in bed, No signs of bleeding.
pt states she feels off   NEURO check in tact- within normal limits   NIH in tact- WNL- NIH of 0    VS stable.

## 2021-02-12 NOTE — CHART NOTE - NSCHARTNOTEFT_GEN_A_CORE
Pt  s/p loop recorder implant on 4peds by Dr Drake     Incision: Dermabond C/D/I; no bleeding, hematoma, erythema, exudate or edema    Plan:   Continued plan per primary medical team. Pt stable for d/c from EPS perspective   [facility] will call you to schedule a 2 week post op follow up, please call [phone] if you don't hear from them or need to be seen sooner.  Loop Recorder Incision Care:     - Do not touch the incision until it is completely healed.   - There is Dermabond (skin glue) on your incision, which will start to flake off on its own over the next 2-3 weeks. Do not pick at or peel off the Dermabond.   - Do not apply soaps, creams, lotions, ointments or powders to the incision until it is completely healed.  - You should call the doctor if you notice redness, drainage, swelling, increased tenderness, hot sensation around the  incision, bleeding or incision edges pulling apart.

## 2021-02-16 PROBLEM — Z78.9 OTHER SPECIFIED HEALTH STATUS: Chronic | Status: ACTIVE | Noted: 2021-02-04

## 2021-02-18 ENCOUNTER — APPOINTMENT (OUTPATIENT)
Dept: NEUROLOGY | Facility: CLINIC | Age: 61
End: 2021-02-18

## 2021-02-23 ENCOUNTER — APPOINTMENT (OUTPATIENT)
Dept: ELECTROPHYSIOLOGY | Facility: CLINIC | Age: 61
End: 2021-02-23
Payer: COMMERCIAL

## 2021-02-23 VITALS
WEIGHT: 120 LBS | OXYGEN SATURATION: 100 % | RESPIRATION RATE: 17 BRPM | HEART RATE: 56 BPM | BODY MASS INDEX: 23.56 KG/M2 | HEIGHT: 60 IN | DIASTOLIC BLOOD PRESSURE: 81 MMHG | SYSTOLIC BLOOD PRESSURE: 121 MMHG | TEMPERATURE: 98.5 F

## 2021-02-23 PROCEDURE — 99072 ADDL SUPL MATRL&STAF TM PHE: CPT

## 2021-02-23 PROCEDURE — 99214 OFFICE O/P EST MOD 30 MIN: CPT

## 2021-02-23 RX ORDER — OMEPRAZOLE 20 MG/1
20 CAPSULE, DELAYED RELEASE ORAL
Refills: 0 | Status: DISCONTINUED | COMMUNITY
End: 2021-02-23

## 2021-02-23 NOTE — DISCUSSION/SUMMARY
[FreeTextEntry1] : 60 year old woman with no significant past medical history who presented to [facility] with aphasia and right arm weakness on 2/4/21 found to have acute L MCA occlusion and acute L ICA occlusion with a hospital course complicated by R vertebral dissection. In discussion with NSICU team, there does not appear to be a clear source for her thrombus and embolus. There is no atherosclerotic disease and the L ICA occlusion does not appear to be due to dissection. As such, it appears that the patient has cryptogenic stroke. Telemetry monitoring does not reveal any evidence of arrhythmia. As per CRYSTAL-AF trial, she would benefit with ILR implantation for long term detection of AF if no AF is seen during her hospitalization. Remains in sinus rhythm on telemetry.\par \par - Parasternal implant site is healing well and WNL\par - NO events noted thus far on ILR \par - Patient to remain on Apixaban for R vertebral artery dissection for a few months, but not lifelong, unless AF is identified on ILR.\par - Will f/up with Dr. Lockhart and have device transmissions transferred\par - Can return for EP f/up as needed\par \par Caty Bates PAC\par \par

## 2021-02-23 NOTE — PHYSICAL EXAM
[General Appearance - Well Developed] : well developed [] : no respiratory distress [Clean] : unclean [Dry] : moist [Healing Well] : healing poorly [Erythema] : not erythematous [Warm] : not warm [Tender] : not tender [Indurated] : not indurated [FreeTextEntry1] : parasternal  [Nail Clubbing] : no clubbing of the fingernails [Cyanosis, Localized] : no localized cyanosis

## 2021-02-23 NOTE — HISTORY OF PRESENT ILLNESS
[de-identified] : 60 year old woman with no significant past medical history who presented to [facility] with aphasia and right arm weakness on 2/4/21 found to have acute L MCA occlusion and acute L ICA occlusion with a hospital course complicated by R vertebral dissection. In discussion with NSICU team, there does not appear to be a clear source for her thrombus and embolus. There is no atherosclerotic disease and the L ICA occlusion does not appear to be due to dissection. As such, it appears that the patient has cryptogenic stroke. Telemetry monitoring does not reveal any evidence of arrhythmia. As per CRYSTAL-AF trial, she would benefit with ILR implantation for long term detection of AF if no AF is seen during her hospitalization. Remained in sinus rhythm on telemetry.   Started on Eliquis for R vertebral artery dissection for a few\par months, but not lifelong, unless evidence of AF on ILR.

## 2021-02-23 NOTE — PROCEDURE
[No] : not [NSR] : normal sinus rhythm [See Device Printout] : See device printout [Normal] : The battery status is normal. [None] : none [de-identified] : [employer] Reveal LINQ [de-identified] : 2/12/21 [de-identified] : NO events thus far

## 2021-02-23 NOTE — REASON FOR VISIT
[New Patient Device Check] : is here today for a new patient device check visit for [Other ___] : [unfilled]

## 2021-02-25 ENCOUNTER — APPOINTMENT (OUTPATIENT)
Dept: NEUROLOGY | Facility: CLINIC | Age: 61
End: 2021-02-25
Payer: COMMERCIAL

## 2021-02-25 VITALS
SYSTOLIC BLOOD PRESSURE: 113 MMHG | DIASTOLIC BLOOD PRESSURE: 77 MMHG | WEIGHT: 120 LBS | HEIGHT: 60 IN | BODY MASS INDEX: 23.56 KG/M2 | HEART RATE: 67 BPM | TEMPERATURE: 98 F

## 2021-02-25 PROCEDURE — 99072 ADDL SUPL MATRL&STAF TM PHE: CPT

## 2021-02-25 PROCEDURE — 99215 OFFICE O/P EST HI 40 MIN: CPT

## 2021-02-25 NOTE — REASON FOR VISIT
[Post Hospitalization] : a post hospitalization visit [FreeTextEntry1] : Hospital follow up after recent hospital admission with CVA / Left fronto parietal penumbra with L MCA CVA

## 2021-02-25 NOTE — DISCUSSION/SUMMARY
[FreeTextEntry1] : She is 60-year-old patient with no past medical history presented with aphasia and right upper extremity weakness on 2/4/21 received TPA and CT CT angiogram revealed thrombus in the left MCA bifurcation with occluded left ICA transferred to [facility] with a resolve symptoms subsequently noted to have recurrent symptoms and subsequently had right vertebral artery dissection.\par The patient had cardiac workup including MICHELE reported normal had ILR placed discharged home on anticoagulants and midodrine for hypotension.\par Acute left MCA infarct status post TPA\par Left frontal lobe and parietal lobes of the gland hemorrhage\par Right vertebral artery dissection\par Hypotension he did continue [place] aspirin 81 mg a day\par Continue Eliqius\par Continue midodrine and wean as tolerated\par Monitor her blood pressure .\par Continue statin 40 mg a day.\par Followup with cardiology.\par Recommend hematology evaluation for underlying genetic testing and hypercoagulable state.\par Followup evaluation in one month.\par Discussion the patient and her sister on the telephone.

## 2021-02-25 NOTE — HISTORY OF PRESENT ILLNESS
[FreeTextEntry1] : She is 60-year-old patient with no major medical problems seen in hand to the hospital emergency room on 2/4/21 with the acute onset of speech difficulties and right-sided upper extremity weakness seen in [facility] emergency room with CT/CT angiogram positive for left frontoparietal penumbra with acute thrombus in the left MCA treated with TPA and transferred to this [facility] for further interventional treatment.\par As per patient and review of the records revealed patient's symptoms improved/resolved by the time she arrived at [facility]. Patient was observed in ICU and transferred to regular floor the next day. And patient's symptoms subsequently worsened and diagnosed to have acute CVA left MCA distribution with occluded right ICA and subsequently noted to have left vertebral artery dissection. Patient was treated with  anticoagulation and discharged home on aspirin with Eliquis and statin.\par Neurological symptoms resolved. Blood pressure noted to be low and treated with midodrine. Subsequently the patient had loop recorder implanted and MICHELE was done and reported negative. Patient was evaluated by speech and PT did not require any physical therapy.\par Currently patient is home and able to ambulate without any assistance and does not require any physical therapy. Anxious without any complaints of headache, focal weakness, but intermittent paresthesias involving hand or leg at times. No difficulties with walking. No visual disturbance. No vertigo. Seen by Dr. Lockhart.\par Recommended hematology evaluation which is pending. Her niece diagnosed with recurrent CVAs with underlying cancer. \par

## 2021-02-25 NOTE — REVIEW OF SYSTEMS
[Numbness] : numbness [Tingling] : tingling [Anxiety] : anxiety [Negative] : Heme/Lymph [de-identified] : Transient Speech difficulties\par Rt side weakness

## 2021-03-16 ENCOUNTER — APPOINTMENT (OUTPATIENT)
Dept: ELECTROPHYSIOLOGY | Facility: CLINIC | Age: 61
End: 2021-03-16

## 2021-03-16 ENCOUNTER — APPOINTMENT (OUTPATIENT)
Dept: NEUROLOGY | Facility: CLINIC | Age: 61
End: 2021-03-16
Payer: COMMERCIAL

## 2021-03-16 VITALS
DIASTOLIC BLOOD PRESSURE: 68 MMHG | TEMPERATURE: 97.4 F | SYSTOLIC BLOOD PRESSURE: 110 MMHG | BODY MASS INDEX: 23.56 KG/M2 | WEIGHT: 120 LBS | HEIGHT: 60 IN

## 2021-03-16 PROCEDURE — 99214 OFFICE O/P EST MOD 30 MIN: CPT

## 2021-03-16 PROCEDURE — 99072 ADDL SUPL MATRL&STAF TM PHE: CPT

## 2021-03-16 NOTE — CONSULT LETTER
[Dear  ___] : Dear  [unfilled], [Consult Letter:] : I had the pleasure of evaluating your patient, [unfilled]. [Please see my note below.] : Please see my note below. [Consult Closing:] : Thank you very much for allowing me to participate in the care of this patient.  If you have any questions, please do not hesitate to contact me. [Sincerely,] : Sincerely, [FreeTextEntry3] : Sheldon Randolph M.D., Ph.D. DPN-N\par [facility]\par Neurology at [place]\par Medical Director of Stroke Services\par [facility]\par

## 2021-03-16 NOTE — PHYSICAL EXAM

## 2021-03-16 NOTE — ASSESSMENT
[FreeTextEntry1] : This is a 60-year-old woman status post stroke. She has a left internal carotid artery occlusion as well as a right vertebral artery dissection. She'll be continued on aspirin and Eliquis. She will continue atorvastatin. She brought her blood pressure log and her blood pressure fluctuates but is typically on the lower side. I would continue his midodrine for blood pressure support while she develops further collaterals. I will see her back in 2 months and at that time repeat an MRA to further evaluate the vertebral artery dissection and is not healed to continue the Eliquis but if he'll consider at the scene the Eliquis in another 2-5 months.

## 2021-03-25 ENCOUNTER — APPOINTMENT (OUTPATIENT)
Dept: NEUROLOGY | Facility: CLINIC | Age: 61
End: 2021-03-25
Payer: COMMERCIAL

## 2021-03-25 VITALS
BODY MASS INDEX: 23.56 KG/M2 | HEART RATE: 70 BPM | SYSTOLIC BLOOD PRESSURE: 105 MMHG | TEMPERATURE: 97.6 F | WEIGHT: 120 LBS | HEIGHT: 60 IN | DIASTOLIC BLOOD PRESSURE: 72 MMHG

## 2021-03-25 DIAGNOSIS — Z92.82 STATUS POST ADMINISTRATION OF TPA (RTPA) IN A DIFFERENT FACILITY WITHIN THE LAST 24 HRS PRIOR TO ADMISSION TO CURRENT FACILITY: ICD-10-CM

## 2021-03-25 PROCEDURE — 99072 ADDL SUPL MATRL&STAF TM PHE: CPT

## 2021-03-25 PROCEDURE — 99214 OFFICE O/P EST MOD 30 MIN: CPT

## 2021-03-25 RX ORDER — UBIDECARENONE/VIT E ACET 100MG-5
CAPSULE ORAL
Refills: 0 | Status: ACTIVE | COMMUNITY

## 2021-03-25 RX ORDER — OMEGA-3/DHA/EPA/FISH OIL 300-1000MG
CAPSULE ORAL
Refills: 0 | Status: ACTIVE | COMMUNITY

## 2021-03-25 NOTE — DISCUSSION/SUMMARY
[FreeTextEntry1] : She 60-year-old patient with history of acute left MCA CVA with occluded left ICA with a right vertebral artery dissection currently on aspirin with Eliquis.\par Continue atorvastatin. \par Continue Midodrin for low blood pressure. \par Recommend EEG far episodic numbness.\par Followup MRI with MR angiogram in May. Will follow up with the results and decide to continue anticoagulants if persistent dissection. \par Patient education provided discussed with patient.\par Gentle exercises as tolerated.\par .\par

## 2021-03-25 NOTE — REASON FOR VISIT
[Follow-Up: _____] : a [unfilled] follow-up visit [FreeTextEntry1] : Follow up fro pt with CVA/ S/p TPA Left MCA stroke

## 2021-03-25 NOTE — HISTORY OF PRESENT ILLNESS
[FreeTextEntry1] : She is 60-year-old patient seen for evaluation for left MCA CVA with TPA and ICA occlusion with vertebral artery dissection coming here for followup evaluation. Persistent hypotensive episodes currently on midodrine and aspirin with Elquis stable neurologically coming here for followup evaluation. Also taking atorvastatin. Intermittent complaints about tingling numbness involving right upper extremity and lower extremity randomly.\par Patient was being evaluated by Dr. Linda Conley stroke specialist at [facility] agree with present management.\par Patient is gradually getting back to her exercises and daily activities slowly. No other new complaints except for above symptoms. Blood pressure remained stable except morning blood pressure remained below 100. Currently taking midodrine 10 mg 3 times a day.

## 2021-03-25 NOTE — REVIEW OF SYSTEMS
[Numbness] : numbness [Tingling] : tingling [Anxiety] : anxiety [Negative] : Heme/Lymph [de-identified] : Transient Speech difficulties\par Rt side weakness

## 2021-03-25 NOTE — DATA REVIEWED
[de-identified] : cute left MCV territory infarcts\par Mild left frontal lobe/ left parietal lobe SAH\par MRA neck Occluded Left ICA\par Long seg narrowing of dominant Rt VA  dissection [de-identified] : CT brain/ CTA  occluded left ICA/ Left M 2/ M# .\par Rt VA dissection

## 2021-04-07 ENCOUNTER — APPOINTMENT (OUTPATIENT)
Dept: NEUROLOGY | Facility: CLINIC | Age: 61
End: 2021-04-07

## 2021-04-20 ENCOUNTER — APPOINTMENT (OUTPATIENT)
Dept: ELECTROPHYSIOLOGY | Facility: CLINIC | Age: 61
End: 2021-04-20

## 2021-05-16 NOTE — HISTORY OF PRESENT ILLNESS
[FreeTextEntry1] : Post hospital followup March 16, 2021:\par This is a 60-year-old woman who presents today for neurologic followup after being seen in the hospital a stroke in February. She was a transfer from [facility] where she received t-PA. 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 She had another event in the hospital possibly due to stump emboli versus hypotension. Repeat imaging demonstrated a right vertebral artery dissection. She was placed on blood pressure support initially IV pressors subsequent midodrine. She was placed on aspirin and Eliquis for both the vertebral artery dissection as well as to prevent strokes and stump emboli. And she was placed on atorvastatin. MICHELE was not revealing for cause of stroke and an implantable with recorder was placed. Since the hospital she's doing well she does have some evidence of increased stress likely brought on by the stroke and may element of posttraumatic stress disorder from a stroke. She has no significant weakness but occasionally has some numbness on the right side. She is here today for neurologic followup.
none

## 2021-05-18 ENCOUNTER — APPOINTMENT (OUTPATIENT)
Dept: NEUROLOGY | Facility: CLINIC | Age: 61
End: 2021-05-18
Payer: COMMERCIAL

## 2021-05-18 VITALS
TEMPERATURE: 97.3 F | SYSTOLIC BLOOD PRESSURE: 120 MMHG | HEIGHT: 60 IN | WEIGHT: 120 LBS | DIASTOLIC BLOOD PRESSURE: 70 MMHG | BODY MASS INDEX: 23.56 KG/M2

## 2021-05-18 PROCEDURE — 99072 ADDL SUPL MATRL&STAF TM PHE: CPT

## 2021-05-18 PROCEDURE — 99214 OFFICE O/P EST MOD 30 MIN: CPT

## 2021-05-18 NOTE — ASSESSMENT
[FreeTextEntry1] : This is a 60-year-old woman with a recent stroke due to ICA occlusion on the left with possible stump emboli she also is found a right vertebral artery dissection. At this time she's doing well and recovering well. She has some anxiety associated with the stroke which is understandable and she has been into therapy and this is helping. She is back to exercising which is excellent. He we have ordered a MRI of the head and neck and we are awaiting its authorization. He'll call her when I have these results. For now she should continue the aspirin and the Eliquis. She should continue atorvastatin with a goal LDL of under 70. She should continue taking the midodrine for blood pressure support. She should avoid hypotension hypertension. I will see her back in the office in 3 months, sooner should the need arise.

## 2021-05-18 NOTE — REVIEW OF SYSTEMS
[As Noted in HPI] : as noted in HPI [Abnormal Sensation] : an abnormal sensation [Negative] : Heme/Lymph

## 2021-05-18 NOTE — CONSULT LETTER
[Dear  ___] : Dear  [unfilled], [Courtesy Letter:] : I had the pleasure of seeing your patient, [unfilled], in my office today. [Please see my note below.] : Please see my note below. [Consult Closing:] : Thank you very much for allowing me to participate in the care of this patient.  If you have any questions, please do not hesitate to contact me. [Sincerely,] : Sincerely, [FreeTextEntry3] : Sheldon Randolph M.D., Ph.D. DPN-N\par [facility]\par Neurology at [place]\par Medical Director of Stroke Services\par [facility]\par

## 2021-05-18 NOTE — HISTORY OF PRESENT ILLNESS
[FreeTextEntry1] : Post hospital followup March 16, 2021:\par This is a 60-year-old woman who presents today for neurologic followup after being seen in the hospital a stroke in February. She was a transfer from [facility] where she received t-PA. 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 She had another event in the hospital possibly due to stump emboli versus hypotension. Repeat imaging demonstrated a right vertebral artery dissection. She was placed on blood pressure support initially IV pressors subsequent midodrine. She was placed on aspirin and Eliquis for both the vertebral artery dissection as well as to prevent strokes and stump emboli. And she was placed on atorvastatin. MICHELE was not revealing for cause of stroke and an implantable with recorder was placed. Since the hospital she's doing well she does have some evidence of increased stress likely brought on by the stroke and may element of posttraumatic stress disorder from a stroke. She has no significant weakness but occasionally has some numbness on the right side. She is here today for neurologic followup.\par \par Followup May 18, 2021:\par This is a 60-year-old woman who presents today for followup of stroke. She is also having tension headache. She had a left ICA occlusion and a right vertebral artery dissection. She is overall doing well and has some intermittent tingling episodes in her body but other than that appears to be doing well. She is back to exercising and running. She is taking midodrine her blood pressure is better controlled in the one teens. She has a loop recorder and states that there's been no abnormal rhythm detected. She is pending a repeat MRA to further evaluate the vertebral artery dissection. She is here today for neurologic followup.

## 2021-05-25 ENCOUNTER — APPOINTMENT (OUTPATIENT)
Dept: ELECTROPHYSIOLOGY | Facility: CLINIC | Age: 61
End: 2021-05-25

## 2021-06-08 ENCOUNTER — RESULT REVIEW (OUTPATIENT)
Age: 61
End: 2021-06-08

## 2021-06-08 ENCOUNTER — APPOINTMENT (OUTPATIENT)
Dept: MRI IMAGING | Facility: CLINIC | Age: 61
End: 2021-06-08
Payer: COMMERCIAL

## 2021-06-08 ENCOUNTER — OUTPATIENT (OUTPATIENT)
Dept: OUTPATIENT SERVICES | Facility: HOSPITAL | Age: 61
LOS: 1 days | End: 2021-06-08
Payer: COMMERCIAL

## 2021-06-08 DIAGNOSIS — I77.74 DISSECTION OF VERTEBRAL ARTERY: ICD-10-CM

## 2021-06-08 PROCEDURE — 70549 MR ANGIOGRAPH NECK W/O&W/DYE: CPT

## 2021-06-08 PROCEDURE — A9585: CPT

## 2021-06-08 PROCEDURE — 70546 MR ANGIOGRAPH HEAD W/O&W/DYE: CPT

## 2021-06-08 PROCEDURE — 70549 MR ANGIOGRAPH NECK W/O&W/DYE: CPT | Mod: 26

## 2021-06-08 PROCEDURE — 70546 MR ANGIOGRAPH HEAD W/O&W/DYE: CPT | Mod: 26

## 2021-06-17 ENCOUNTER — APPOINTMENT (OUTPATIENT)
Dept: NEUROLOGY | Facility: CLINIC | Age: 61
End: 2021-06-17
Payer: COMMERCIAL

## 2021-06-17 VITALS
BODY MASS INDEX: 23.56 KG/M2 | HEIGHT: 60 IN | DIASTOLIC BLOOD PRESSURE: 70 MMHG | WEIGHT: 120 LBS | TEMPERATURE: 97.6 F | SYSTOLIC BLOOD PRESSURE: 120 MMHG

## 2021-06-17 PROCEDURE — 99214 OFFICE O/P EST MOD 30 MIN: CPT

## 2021-06-17 NOTE — CONSULT LETTER
[Dear  ___] : Dear  [unfilled], [Courtesy Letter:] : I had the pleasure of seeing your patient, [unfilled], in my office today. [Please see my note below.] : Please see my note below. [Consult Closing:] : Thank you very much for allowing me to participate in the care of this patient.  If you have any questions, please do not hesitate to contact me. [Sincerely,] : Sincerely, [FreeTextEntry3] : Sheldon Randolph M.D., Ph.D. DPN-N\par [facility]\par Neurology at [place]\par Medical Director of Stroke Services\par [facility]\par

## 2021-06-17 NOTE — ASSESSMENT
[FreeTextEntry1] : This is a 60-year-old woman with history of stroke due to internal carotid artery occlusion as well as vertebral artery stenosis. She is doing better by radiographic findings. No continued to be Eliquis for a total course of 6 months following a stroke or reimage the the head and neck with MRA and is still improving we will likely discontinue the Eliquis at that time and just continue her on aspirin. She will also have an MRI of the cervical spine given her neck pain. I will see her back in 3 months, sooner should the need arise.

## 2021-06-17 NOTE — PHYSICAL EXAM

## 2021-06-17 NOTE — DATA REVIEWED
[de-identified] : I reviewed images are then MRA of the head and neck done with contrast on June 8, 2021. Comparative studies done from the hospital she has improved blood flow both in the left internal carotid artery as well as a middle cerebral artery on the left as well as the right vertebral artery.

## 2021-06-17 NOTE — HISTORY OF PRESENT ILLNESS
[FreeTextEntry1] : Post hospital followup March 16, 2021:\par This is a 60-year-old woman who presents today for neurologic followup after being seen in the hospital a stroke in February. She was a transfer from [facility] where she received t-PA. 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 She had another event in the hospital possibly due to stump emboli versus hypotension. Repeat imaging demonstrated a right vertebral artery dissection. She was placed on blood pressure support initially IV pressors subsequent midodrine. She was placed on aspirin and Eliquis for both the vertebral artery dissection as well as to prevent strokes and stump emboli. And she was placed on atorvastatin. MICHELE was not revealing for cause of stroke and an implantable with recorder was placed. Since the hospital she's doing well she does have some evidence of increased stress likely brought on by the stroke and may element of posttraumatic stress disorder from a stroke. She has no significant weakness but occasionally has some numbness on the right side. She is here today for neurologic followup.\par \par Followup May 18, 2021:\par This is a 60-year-old woman who presents today for followup of stroke. She is also having tension headache. She had a left ICA occlusion and a right vertebral artery dissection. She is overall doing well and has some intermittent tingling episodes in her body but other than that appears to be doing well. She is back to exercising and running. She is taking midodrine her blood pressure is better controlled in the one teens. She has a loop recorder and states that there's been no abnormal rhythm detected. She is pending a repeat MRA to further evaluate the vertebral artery dissection. She is here today for neurologic followup.\par \par Followup June 17, 2021:\par This is a 60-year-old woman who presents today for neurologic followup stroke. She also has tension-type headaches. She had a both left internal carotid artery occlusion as well as a right vertebral artery dissection. She had followup MRAs which showed proved flow through these arteries. She is having migrating paresthesias which may be due to anxiety. She is doing better since started on a very low dose of Xanax. She is here today for neurologic followup.

## 2021-06-29 ENCOUNTER — APPOINTMENT (OUTPATIENT)
Dept: ELECTROPHYSIOLOGY | Facility: CLINIC | Age: 61
End: 2021-06-29

## 2021-07-14 NOTE — PHYSICAL THERAPY INITIAL EVALUATION ADULT - PATIENT/FAMILY/SIGNIFICANT OTHER GOALS STATEMENT, PT EVAL
to go home
to go home
O-T Plasty Text: The defect edges were debeveled with a #15 scalpel blade.  Given the location of the defect, shape of the defect and the proximity to free margins an O-T plasty was deemed most appropriate.  Using a sterile surgical marker, an appropriate O-T plasty was drawn incorporating the defect and placing the expected incisions within the relaxed skin tension lines where possible.    The area thus outlined was incised deep to adipose tissue with a #15 scalpel blade.  The skin margins were undermined to an appropriate distance in all directions utilizing iris scissors.

## 2021-07-19 ENCOUNTER — OUTPATIENT (OUTPATIENT)
Dept: OUTPATIENT SERVICES | Facility: HOSPITAL | Age: 61
LOS: 1 days | End: 2021-07-19
Payer: COMMERCIAL

## 2021-07-19 ENCOUNTER — APPOINTMENT (OUTPATIENT)
Dept: MRI IMAGING | Facility: CLINIC | Age: 61
End: 2021-07-19
Payer: COMMERCIAL

## 2021-07-19 DIAGNOSIS — G44.229 CHRONIC TENSION-TYPE HEADACHE, NOT INTRACTABLE: ICD-10-CM

## 2021-07-19 PROCEDURE — 72141 MRI NECK SPINE W/O DYE: CPT

## 2021-07-19 PROCEDURE — 72141 MRI NECK SPINE W/O DYE: CPT | Mod: 26

## 2021-08-02 ENCOUNTER — TRANSCRIPTION ENCOUNTER (OUTPATIENT)
Age: 61
End: 2021-08-02

## 2021-08-07 NOTE — PROGRESS NOTE ADULT - NSHPATTENDINGPLANDISCUSS_GEN_ALL_CORE
NSG team
pt, rn, cardio NP, neuro, vascular sx, NSICU
NSG team
[facility]

## 2021-08-16 ENCOUNTER — APPOINTMENT (OUTPATIENT)
Dept: NEUROLOGY | Facility: CLINIC | Age: 61
End: 2021-08-16

## 2021-09-08 ENCOUNTER — OUTPATIENT (OUTPATIENT)
Dept: OUTPATIENT SERVICES | Facility: HOSPITAL | Age: 61
LOS: 1 days | End: 2021-09-08
Payer: COMMERCIAL

## 2021-09-08 ENCOUNTER — APPOINTMENT (OUTPATIENT)
Dept: MRI IMAGING | Facility: CLINIC | Age: 61
End: 2021-09-08
Payer: COMMERCIAL

## 2021-09-08 DIAGNOSIS — Z00.8 ENCOUNTER FOR OTHER GENERAL EXAMINATION: ICD-10-CM

## 2021-09-08 DIAGNOSIS — Z86.73 PERSONAL HISTORY OF TRANSIENT ISCHEMIC ATTACK (TIA), AND CEREBRAL INFARCTION WITHOUT RESIDUAL DEFICITS: ICD-10-CM

## 2021-09-08 DIAGNOSIS — I77.74 DISSECTION OF VERTEBRAL ARTERY: ICD-10-CM

## 2021-09-08 DIAGNOSIS — I65.22 OCCLUSION AND STENOSIS OF LEFT CAROTID ARTERY: ICD-10-CM

## 2021-09-08 PROCEDURE — 70545 MR ANGIOGRAPHY HEAD W/DYE: CPT | Mod: 26

## 2021-09-08 PROCEDURE — A9585: CPT

## 2021-09-08 PROCEDURE — 70545 MR ANGIOGRAPHY HEAD W/DYE: CPT

## 2021-09-20 ENCOUNTER — APPOINTMENT (OUTPATIENT)
Dept: NEUROLOGY | Facility: CLINIC | Age: 61
End: 2021-09-20
Payer: COMMERCIAL

## 2021-09-20 DIAGNOSIS — G44.229 CHRONIC TENSION-TYPE HEADACHE, NOT INTRACTABLE: ICD-10-CM

## 2021-09-20 PROCEDURE — 99214 OFFICE O/P EST MOD 30 MIN: CPT

## 2021-09-20 RX ORDER — MIDODRINE HYDROCHLORIDE 10 MG/1
10 TABLET ORAL 3 TIMES DAILY
Refills: 0 | Status: ACTIVE | COMMUNITY

## 2021-09-20 NOTE — CONSULT LETTER
[Dear  ___] : Dear  [unfilled], [Courtesy Letter:] : I had the pleasure of seeing your patient, [unfilled], in my office today. [Please see my note below.] : Please see my note below. [Consult Closing:] : Thank you very much for allowing me to participate in the care of this patient.  If you have any questions, please do not hesitate to contact me. [Sincerely,] : Sincerely, [FreeTextEntry3] : Sheldon Randolph M.D., Ph.D. DPN-N\par [facility]\par Neurology at [place]\par Medical Director of Stroke Services\par [facility]\par

## 2021-09-20 NOTE — ASSESSMENT
[FreeTextEntry1] : This is a 61-year-old woman with a history of stroke history of left internal carotid artery occlusion as well as the right vertebral dissection. She should continue on aspirin and atorvastatin for stroke prevention. Regarding the Eliquis she feels more comfortable on it she states and we will continue it until next visit when we will review whether or not it remains necessary. Because of the ICA occlusion I do not have any problem keeping her on Eliquis an extra 3 months. Vertebral artery dissection appears to have healed based on the most recent MRA. I will see her back in the office in 3 months, sooner should the need arise.

## 2021-09-30 ENCOUNTER — EMERGENCY (EMERGENCY)
Facility: HOSPITAL | Age: 61
LOS: 0 days | Discharge: HOME | End: 2021-10-01
Attending: EMERGENCY MEDICINE
Payer: COMMERCIAL

## 2021-09-30 VITALS
SYSTOLIC BLOOD PRESSURE: 114 MMHG | HEIGHT: 63 IN | RESPIRATION RATE: 18 BRPM | HEART RATE: 65 BPM | WEIGHT: 121.92 LBS | OXYGEN SATURATION: 100 % | TEMPERATURE: 98 F | DIASTOLIC BLOOD PRESSURE: 52 MMHG

## 2021-09-30 DIAGNOSIS — B02.9 ZOSTER WITHOUT COMPLICATIONS: ICD-10-CM

## 2021-09-30 DIAGNOSIS — Z79.82 LONG TERM (CURRENT) USE OF ASPIRIN: ICD-10-CM

## 2021-09-30 DIAGNOSIS — R21 RASH AND OTHER NONSPECIFIC SKIN ERUPTION: ICD-10-CM

## 2021-09-30 DIAGNOSIS — M54.9 DORSALGIA, UNSPECIFIED: ICD-10-CM

## 2021-09-30 DIAGNOSIS — Z79.01 LONG TERM (CURRENT) USE OF ANTICOAGULANTS: ICD-10-CM

## 2021-09-30 PROCEDURE — 99283 EMERGENCY DEPT VISIT LOW MDM: CPT

## 2021-09-30 PROCEDURE — 99284 EMERGENCY DEPT VISIT MOD MDM: CPT

## 2021-09-30 NOTE — ED ADULT TRIAGE NOTE - CHIEF COMPLAINT QUOTE
right side discomfort and rash first noticed on tuesday to right breast, now extending to right side back. reports constant pain to right side. spoke to neurologist who sent to ED to evaluate for shingles.

## 2021-10-01 VITALS
HEART RATE: 64 BPM | OXYGEN SATURATION: 100 % | DIASTOLIC BLOOD PRESSURE: 58 MMHG | TEMPERATURE: 98 F | RESPIRATION RATE: 18 BRPM | SYSTOLIC BLOOD PRESSURE: 116 MMHG

## 2021-10-01 RX ORDER — ACYCLOVIR SODIUM 500 MG
1 VIAL (EA) INTRAVENOUS
Qty: 40 | Refills: 0
Start: 2021-10-01 | End: 2021-10-10

## 2021-10-01 RX ORDER — ACYCLOVIR SODIUM 500 MG
800 VIAL (EA) INTRAVENOUS ONCE
Refills: 0 | Status: COMPLETED | OUTPATIENT
Start: 2021-10-01 | End: 2021-10-01

## 2021-10-01 RX ADMIN — Medication 800 MILLIGRAM(S): at 00:31

## 2021-10-01 RX ADMIN — Medication 10 MILLIGRAM(S): at 00:31

## 2021-10-01 NOTE — ED PROVIDER NOTE - SKIN, MLM
+ right upper thoracic and across to right breast dermatomal vesicluar lesions, no superimposed cellutlitus

## 2021-10-01 NOTE — ED PROVIDER NOTE - PATIENT PORTAL LINK FT
You can access the FollowMyHealth Patient Portal offered by [facility] by registering at the following website: [email or web address]. By joining [employer]’s FollowMyHealth portal, you will also be able to view your health information using other applications (apps) compatible with our system.

## 2021-10-01 NOTE — ED PROVIDER NOTE - OBJECTIVE STATEMENT
pt presents with right sided back pain started three days ago constant achy no nradiating and then today vesular rash started on back and around to right breast it does not cross midline. pain 8/10 achy sharp in nature no abd pain no fevers. no vision changes

## 2021-10-01 NOTE — ED PROVIDER NOTE - NSICDXFAMILYHX_GEN_ALL_CORE_FT
FAMILY HISTORY:  FH: stroke, father    Father  Still living? Unknown  FH: myocardial infarction, Age at diagnosis: Age Unknown

## 2021-12-27 ENCOUNTER — APPOINTMENT (OUTPATIENT)
Dept: NEUROLOGY | Facility: CLINIC | Age: 61
End: 2021-12-27
Payer: COMMERCIAL

## 2021-12-27 VITALS
HEIGHT: 60 IN | BODY MASS INDEX: 23.56 KG/M2 | WEIGHT: 120 LBS | DIASTOLIC BLOOD PRESSURE: 72 MMHG | SYSTOLIC BLOOD PRESSURE: 122 MMHG

## 2021-12-27 PROCEDURE — 99213 OFFICE O/P EST LOW 20 MIN: CPT

## 2021-12-27 NOTE — CONSULT LETTER
[Dear  ___] : Dear  [unfilled], [Courtesy Letter:] : I had the pleasure of seeing your patient, [unfilled], in my office today. [Please see my note below.] : Please see my note below. [Consult Closing:] : Thank you very much for allowing me to participate in the care of this patient.  If you have any questions, please do not hesitate to contact me. [Sincerely,] : Sincerely, [FreeTextEntry3] : Sheldon Randolph M.D., Ph.D. DPN-N\par [facility]\par Neurology at [place]\par Medical Director of Stroke Services\par [facility]\par

## 2021-12-27 NOTE — ASSESSMENT
[FreeTextEntry1] : This is a 61-year-old woman with a history of stroke is beginning of this year. She is doing well. At this point will continue the Eliquis for another 3 months to make about one year after her stroke. Should she still be asymptomatic I would have no problem discontinued and is keeping her on aspirin. I will see her back in the office in 3 months, sooner should the need arise. She, in the interim with any problems questions or concerns.

## 2021-12-27 NOTE — HISTORY OF PRESENT ILLNESS
[FreeTextEntry1] : Post hospital followup March 16, 2021:\par This is a 60-year-old woman who presents today for neurologic followup after being seen in the hospital a stroke in February. She was a transfer from [facility] where she received t-PA. 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 She had another event in the hospital possibly due to stump emboli versus hypotension. Repeat imaging demonstrated a right vertebral artery dissection. She was placed on blood pressure support initially IV pressors subsequent midodrine. She was placed on aspirin and Eliquis for both the vertebral artery dissection as well as to prevent strokes and stump emboli. And she was placed on atorvastatin. MICHELE was not revealing for cause of stroke and an implantable with recorder was placed. Since the hospital she's doing well she does have some evidence of increased stress likely brought on by the stroke and may element of posttraumatic stress disorder from a stroke. She has no significant weakness but occasionally has some numbness on the right side. She is here today for neurologic followup.\par \par Followup May 18, 2021:\par This is a 60-year-old woman who presents today for followup of stroke. She is also having tension headache. She had a left ICA occlusion and a right vertebral artery dissection. She is overall doing well and has some intermittent tingling episodes in her body but other than that appears to be doing well. She is back to exercising and running. She is taking midodrine her blood pressure is better controlled in the one teens. She has a loop recorder and states that there's been no abnormal rhythm detected. She is pending a repeat MRA to further evaluate the vertebral artery dissection. She is here today for neurologic followup.\par \par Followup June 17, 2021:\par This is a 60-year-old woman who presents today for neurologic followup stroke. She also has tension-type headaches. She had a both left internal carotid artery occlusion as well as a right vertebral artery dissection. She had followup MRAs which showed proved flow through these arteries. She is having migrating paresthesias which may be due to anxiety. She is doing better since started on a very low dose of Xanax. She is here today for neurologic followup.\par \par Followup September 20, 2021:\par This is a 61-year-old woman who presents today for followup of stroke. She had a stroke a little over 6 months ago. She was found to have a left ICA occlusion right vertebral artery dissection. Repeat MRA of the [place] done September 8 was unremarkable. She continues to half a tension type headaches. She is feeling overall better however and his return to running. She stress currently running 2-1/2-3 miles but has hopes to run the half marathon. She is here today for neurologic followup.\par \par Followup December 27, 2021:\par This is a 61-year-old woman who presents today for followup of stroke. She had a stroke in February of this year. She had a left ICA occlusion right vertebral artery dissection. She had unremarkable MRA of the [place] in September. She's overall doing well. She did have shingles for which he took Lyrica which she could not tolerate. Eventually after 7 weeks the pain from the shingles ended. She is here today for followup.

## 2021-12-27 NOTE — PHYSICAL EXAM
[Person] : oriented to person [Place] : oriented to place [Time] : oriented to time [Remote Intact] : remote memory intact [Registration Intact] : recent registration memory intact [Span Intact] : the attention span was normal [Concentration Intact] : normal concentrating ability [Visual Intact] : visual attention was ~T not ~L decreased [Naming Objects] : no difficulty naming common objects [Repeating Phrases] : no difficulty repeating a phrase [Fluency] : fluency intact [Comprehension] : comprehension intact [Current Events] : adequate knowledge of current events [Past History] : adequate knowledge of personal past history [Cranial Nerves Optic (II)] : visual acuity intact bilaterally,  visual fields full to confrontation, pupils equal round and reactive to light [Cranial Nerves Oculomotor (III)] : extraocular motion intact [Cranial Nerves Trigeminal (V)] : facial sensation intact symmetrically [Cranial Nerves Facial (VII)] : face symmetrical [Cranial Nerves Vestibulocochlear (VIII)] : hearing was intact bilaterally [Cranial Nerves Glossopharyngeal (IX)] : tongue and palate midline [Cranial Nerves Accessory (XI - Cranial And Spinal)] : head turning and shoulder shrug symmetric [Cranial Nerves Hypoglossal (XII)] : there was no tongue deviation with protrusion [Motor Tone] : muscle tone was normal in all four extremities [Motor Strength] : muscle strength was normal in all four extremities [Involuntary Movements] : no involuntary movements were seen [No Muscle Atrophy] : normal bulk in all four extremities [Motor Handedness Right-Handed] : the patient is right hand dominant [Paresis Pronator Drift Right-Sided] : no pronator drift on the right [Paresis Pronator Drift Left-Sided] : no pronator drift on the left [Motor Strength Upper Extremities Bilaterally] : strength was normal in both upper extremities [Motor Strength Lower Extremities Bilaterally] : strength was normal in both lower extremities [Sensation Tactile Decrease] : light touch was intact [Sensation Pain / Temperature Decrease] : pain and temperature was intact [Sensation Vibration Decrease] : vibration was intact [Proprioception] : proprioception was intact [Abnormal Walk] : normal gait [Balance] : balance was intact [Tremor] : no tremor present [Coordination - Dysmetria Impaired Finger-to-Nose Bilateral] : not present [2+] : Patella left 2+ [Sclera] : the sclera and conjunctiva were normal [Extraocular Movements] : extraocular movements were intact [PERRL With Normal Accommodation] : pupils were equal in size, round, reactive to light, with normal accommodation [No APD] : no afferent pupillary defect [No DA] : no internuclear ophthalmoplegia [Full Visual Field] : full visual field

## 2022-04-27 ENCOUNTER — APPOINTMENT (OUTPATIENT)
Dept: NEUROLOGY | Facility: CLINIC | Age: 62
End: 2022-04-27
Payer: COMMERCIAL

## 2022-04-27 ENCOUNTER — NON-APPOINTMENT (OUTPATIENT)
Age: 62
End: 2022-04-27

## 2022-04-27 DIAGNOSIS — R20.0 ANESTHESIA OF SKIN: ICD-10-CM

## 2022-04-27 DIAGNOSIS — R20.2 ANESTHESIA OF SKIN: ICD-10-CM

## 2022-04-27 PROCEDURE — 99214 OFFICE O/P EST MOD 30 MIN: CPT

## 2022-04-27 NOTE — HISTORY OF PRESENT ILLNESS
[FreeTextEntry1] : Post hospital followup March 16, 2021:\par This is a 60-year-old woman who presents today for neurologic followup after being seen in the hospital a stroke in February. She was a transfer from [facility] where she received t-PA. 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 She had another event in the hospital possibly due to stump emboli versus hypotension. Repeat imaging demonstrated a right vertebral artery dissection. She was placed on blood pressure support initially IV pressors subsequent midodrine. She was placed on aspirin and Eliquis for both the vertebral artery dissection as well as to prevent strokes and stump emboli. And she was placed on atorvastatin. MICHELE was not revealing for cause of stroke and an implantable with recorder was placed. Since the hospital she's doing well she does have some evidence of increased stress likely brought on by the stroke and may element of posttraumatic stress disorder from a stroke. She has no significant weakness but occasionally has some numbness on the right side. She is here today for neurologic followup.\par \par Followup May 18, 2021:\par This is a 60-year-old woman who presents today for followup of stroke. She is also having tension headache. She had a left ICA occlusion and a right vertebral artery dissection. She is overall doing well and has some intermittent tingling episodes in her body but other than that appears to be doing well. She is back to exercising and running. She is taking midodrine her blood pressure is better controlled in the one teens. She has a loop recorder and states that there's been no abnormal rhythm detected. She is pending a repeat MRA to further evaluate the vertebral artery dissection. She is here today for neurologic followup.\par \par Followup June 17, 2021:\par This is a 60-year-old woman who presents today for neurologic followup stroke. She also has tension-type headaches. She had a both left internal carotid artery occlusion as well as a right vertebral artery dissection. She had followup MRAs which showed proved flow through these arteries. She is having migrating paresthesias which may be due to anxiety. She is doing better since started on a very low dose of Xanax. She is here today for neurologic followup.\par \par Followup September 20, 2021:\par This is a 61-year-old woman who presents today for followup of stroke. She had a stroke a little over 6 months ago. She was found to have a left ICA occlusion right vertebral artery dissection. Repeat MRA of the [place] done September 8 was unremarkable. She continues to half a tension type headaches. She is feeling overall better however and his return to running. She stress currently running 2-1/2-3 miles but has hopes to run the half marathon. She is here today for neurologic followup.\par \par Followup December 27, 2021:\par This is a 61-year-old woman who presents today for followup of stroke. She had a stroke in February of this year. She had a left ICA occlusion right vertebral artery dissection. She had unremarkable MRA of the [place] in September. She's overall doing well. She did have shingles for which he took Lyrica which she could not tolerate. Eventually after 7 weeks the pain from the shingles ended. She is here today for followup.\par \par Follow-up April 27, 2022:\par This is a 61-year-old woman who presents today for follow-up of stroke.  She had a stroke a year ago in February due to left ICA occlusion and right vertebral dissections.  Recently she had an unremarkable MRA of the [place] last September.  She continues to have intermittent paresthesias primarily on the right side.  This is followed by an eye pressure in the temporal aspect of the right eye.  She has been to the ophthalmologist who feels this may be neurologic and related to her previous stroke.  She reports because of the tingling and eye pain she feels uncomfortable at times and has to leave work.  This is adversely affecting her job and making her consider to retire with disability.  She is here today for neurologic follow-up.

## 2022-04-27 NOTE — ASSESSMENT
[FreeTextEntry1] : This is a 61-year-old woman with history of a stroke as well as left ICA occlusion right vertebral dissection.  At this point she is reexperiencing symptoms and is concerning to her.  Would like to check an MRI of her brain to ensure that there is no new stroke.  Would like to do an MRA of her head and neck to reevaluate her blood vessels.  This will be done prior to limited include Eliquis.  And I would like to check MRI of her orbits today given the eye pain that accompany this.  I will send her for the studies on her and call her once I have a chance to review them with I decision whether or not she will remain on the Eliquis.  For the time being I will continue it.  I will see her back in 6 months as long as the MRI/A's look okay, sooner should the need arise.

## 2022-04-27 NOTE — CONSULT LETTER
[Dear  ___] : Dear  [unfilled], [Courtesy Letter:] : I had the pleasure of seeing your patient, [unfilled], in my office today. [Please see my note below.] : Please see my note below. [Consult Closing:] : Thank you very much for allowing me to participate in the care of this patient.  If you have any questions, please do not hesitate to contact me. [Sincerely,] : Sincerely, [FreeTextEntry3] : Sheldon Randolph M.D., Ph.D. DPN-N\par [facility]\par Neurology at [place]\par Medical Director of Stroke Services\par [facility]\par

## 2022-05-05 ENCOUNTER — APPOINTMENT (OUTPATIENT)
Dept: NEUROLOGY | Facility: CLINIC | Age: 62
End: 2022-05-05

## 2022-05-12 ENCOUNTER — APPOINTMENT (OUTPATIENT)
Dept: MRI IMAGING | Facility: CLINIC | Age: 62
End: 2022-05-12
Payer: COMMERCIAL

## 2022-05-12 ENCOUNTER — OUTPATIENT (OUTPATIENT)
Dept: OUTPATIENT SERVICES | Facility: HOSPITAL | Age: 62
LOS: 1 days | End: 2022-05-12
Payer: COMMERCIAL

## 2022-05-12 DIAGNOSIS — Z00.8 ENCOUNTER FOR OTHER GENERAL EXAMINATION: ICD-10-CM

## 2022-05-12 PROCEDURE — 70543 MRI ORBT/FAC/NCK W/O &W/DYE: CPT | Mod: 26

## 2022-05-12 PROCEDURE — 70544 MR ANGIOGRAPHY HEAD W/O DYE: CPT

## 2022-05-12 PROCEDURE — 70544 MR ANGIOGRAPHY HEAD W/O DYE: CPT | Mod: 26

## 2022-05-12 PROCEDURE — 70548 MR ANGIOGRAPHY NECK W/DYE: CPT | Mod: 26

## 2022-05-12 PROCEDURE — 70543 MRI ORBT/FAC/NCK W/O &W/DYE: CPT

## 2022-05-12 PROCEDURE — A9585: CPT

## 2022-05-12 PROCEDURE — 70548 MR ANGIOGRAPHY NECK W/DYE: CPT

## 2022-05-16 RX ORDER — APIXABAN 5 MG/1
5 TABLET, FILM COATED ORAL
Refills: 0 | Status: DISCONTINUED | COMMUNITY
End: 2022-05-16

## 2022-06-04 NOTE — PROGRESS NOTE ADULT - ASSESSMENT
ASSESSMENT/PLAN:  60 year old female with no PMH presents with thrombus distal to L MCA bifurcation. Symptoms resolved s/p TPA.   Recurrence of Sx with NIH 1 on 2/6/21.  R vertebral dissection.  Small L frontal L parietal SAH on MR, not visualized on CTh.    PLAN:  Neuro:  - q2 hr neuro checks   - on Heparin drip - switch to Eliquis PO  - Dago-Synephrine drip-  HTN augmentation - improved with SBP > 135mmHg - went to rene 40, switch to Levo for now; weaning ggt with PO Midodrine  - ASA q day  -DAVIAN involved - no intervention for now, medical management recommended.  - PT/OT/ Speech    CV:   --150  - LDL - goal 70 - atorvastatin 20mg  qday   -repeat EKG     Respiratory:  -RA  -SPO2 >92  -Incentive Spirometry    GI: diet as tolerated  Stool softeners     Heme:  AC as above, +SCDs    Endo:  monitor FS     ID: Chronic sinusitis   Flonase  q day  Afebrile    CODE STATUS:  [X] Full Code    DISPOSITION:  [X] ICU     [X] Patient is  critically ill and monitor for recurrent stroke     Time spent: 40 critical care minutes FREE:[LAST:[Cedrick],FIRST:[Jose R],PHONE:[phone]],FAX:[(   )    -],ADDRESS:[[address] [place] [address]],FOLLOWUP:[2 weeks],ESTABLISHEDPATIENT:[T]]

## 2022-06-22 NOTE — ED ADULT NURSE NOTE - NSFALLRSKASSESSDT_ED_ALL_ED
POSTOPERATIVE DAY 1  INTRAOCULAR LENS, OS    Jaquelin slept well  mild discomfort  --better since patch removed  feels fine    --s/p IOL OD '19, TH**      Slit lamp exam:    wound intact  cornea:  min edema near inc, minimal stria  2-3+ cell/flare  pupil round    ASSESSMENT: stable postoperative day 1  --POSS LIMITS DUE TO ERM** DISCUSSED     --MILD IOP SPIKE**     PLAN: Start drops: Antibiotic and Steroid 4 times per day as per orders  Ketorolac 4x/day        Instruction sheet reviewed      see orders/disposition    --1 gtt Carmen in office    04-Feb-2021 10:56

## 2022-08-30 ENCOUNTER — NON-APPOINTMENT (OUTPATIENT)
Age: 62
End: 2022-08-30

## 2022-08-31 NOTE — HISTORY OF PRESENT ILLNESS
[FreeTextEntry1] : Post hospital followup March 16, 2021:\par This is a 60-year-old woman who presents today for neurologic followup after being seen in the hospital a stroke in February. She was a transfer from [facility] where she received t-PA. 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 She had another event in the hospital possibly due to stump emboli versus hypotension. Repeat imaging demonstrated a right vertebral artery dissection. She was placed on blood pressure support initially IV pressors subsequent midodrine. She was placed on aspirin and Eliquis for both the vertebral artery dissection as well as to prevent strokes and stump emboli. And she was placed on atorvastatin. MICHELE was not revealing for cause of stroke and an implantable with recorder was placed. Since the hospital she's doing well she does have some evidence of increased stress likely brought on by the stroke and may element of posttraumatic stress disorder from a stroke. She has no significant weakness but occasionally has some numbness on the right side. She is here today for neurologic followup.\par \par Followup May 18, 2021:\par This is a 60-year-old woman who presents today for followup of stroke. She is also having tension headache. She had a left ICA occlusion and a right vertebral artery dissection. She is overall doing well and has some intermittent tingling episodes in her body but other than that appears to be doing well. She is back to exercising and running. She is taking midodrine her blood pressure is better controlled in the one teens. She has a loop recorder and states that there's been no abnormal rhythm detected. She is pending a repeat MRA to further evaluate the vertebral artery dissection. She is here today for neurologic followup.\par \par Followup June 17, 2021:\par This is a 60-year-old woman who presents today for neurologic followup stroke. She also has tension-type headaches. She had a both left internal carotid artery occlusion as well as a right vertebral artery dissection. She had followup MRAs which showed proved flow through these arteries. She is having migrating paresthesias which may be due to anxiety. She is doing better since started on a very low dose of Xanax. She is here today for neurologic followup.\par \par Followup September 20, 2021:\par This is a 61-year-old woman who presents today for followup of stroke. She had a stroke a little over 6 months ago. She was found to have a left ICA occlusion right vertebral artery dissection. Repeat MRA of the [place] done September 8 was unremarkable. She continues to half a tension type headaches. She is feeling overall better however and his return to running. She stress currently running 2-1/2-3 miles but has hopes to run the half marathon. She is here today for neurologic followup.
[FreeTextEntry1] : RAYRAY RODRIGUEZ is a 72 year old female who presents for evaluation of nonhealing right foot wound. \par \par Referred by Dr. Love (podiatry).\par \par Patient states that in January 2022 with difficulty breathing and required antibiotics for pneumonia. She had to be hospitalized in March 2022 for pneumonia. She was eventually discharged to rehab. In rehab, she had heel offloading boots which rubbed her bilateral feet. She initially had five wounds on the right foot, four of them have healed but she has a persistent right medial ankle wound. 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 She has been following with Dr. Love for the foot wound. She also follows with Dr. Gray (wound care) for sacral wound. \par \par + PMH: asthma, COPD, diabetes, tracheobronchomalacia, HTN, GERD, atrial fibrillation, lung nodule, colorectal cancer, mod-severe AI\par + PSH: s/p tracheobronchoplasty, s/p colon resection with colostomy\par + FH: cancer, diabetes, hypertension\par + SH: never smoker, no etoh use\par + Aller: cefepime, pencillin, ampicillin, aspirin, codeine, dialudid, iodine, oxycontin, tetanus toxoid, valium\par \par PCP is Dr. Bon Samuels.\par

## 2022-10-11 ENCOUNTER — APPOINTMENT (OUTPATIENT)
Dept: NEUROLOGY | Facility: CLINIC | Age: 62
End: 2022-10-11

## 2022-10-11 ENCOUNTER — APPOINTMENT (OUTPATIENT)
Dept: OBGYN | Facility: CLINIC | Age: 62
End: 2022-10-11

## 2022-10-11 VITALS
DIASTOLIC BLOOD PRESSURE: 78 MMHG | HEIGHT: 60 IN | BODY MASS INDEX: 23.56 KG/M2 | WEIGHT: 120 LBS | SYSTOLIC BLOOD PRESSURE: 122 MMHG

## 2022-10-11 VITALS
HEART RATE: 60 BPM | DIASTOLIC BLOOD PRESSURE: 78 MMHG | SYSTOLIC BLOOD PRESSURE: 123 MMHG | WEIGHT: 120 LBS | BODY MASS INDEX: 23.44 KG/M2

## 2022-10-11 DIAGNOSIS — I65.22 OCCLUSION AND STENOSIS OF LEFT CAROTID ARTERY: ICD-10-CM

## 2022-10-11 DIAGNOSIS — Z86.73 PERSONAL HISTORY OF TRANSIENT ISCHEMIC ATTACK (TIA), AND CEREBRAL INFARCTION W/OUT RESIDUAL DEFICITS: ICD-10-CM

## 2022-10-11 DIAGNOSIS — R87.619 UNSPECIFIED ABNORMAL CYTOLOGICAL FINDINGS IN SPECIMENS FROM CERVIX UTERI: ICD-10-CM

## 2022-10-11 PROCEDURE — 99213 OFFICE O/P EST LOW 20 MIN: CPT

## 2022-10-11 NOTE — CONSULT LETTER
[Dear  ___] : Dear  [unfilled], [Courtesy Letter:] : I had the pleasure of seeing your patient, [unfilled], in my office today. [Please see my note below.] : Please see my note below. [Consult Closing:] : Thank you very much for allowing me to participate in the care of this patient.  If you have any questions, please do not hesitate to contact me. [Sincerely,] : Sincerely, [FreeTextEntry3] : Sheldon Randolph M.D., Ph.D. DPN-N\par [facility]\par Neurology at [place]\par Medical Director of Stroke Services\par [facility]\par

## 2022-10-11 NOTE — ASSESSMENT
[FreeTextEntry1] : This is a 62-year-old woman with a history of stroke.  She is currently doing well on aspirin and Lipitor.  She we will continue these medications for secondary stroke prevention.  I will see her back in the office in 6 months, sooner should the need arise.

## 2022-10-11 NOTE — HISTORY OF PRESENT ILLNESS
[FreeTextEntry1] : Post hospital followup March 16, 2021:\par This is a 60-year-old woman who presents today for neurologic followup after being seen in the hospital a stroke in February. She was a transfer from [facility] where she received t-PA. 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 She had another event in the hospital possibly due to stump emboli versus hypotension. Repeat imaging demonstrated a right vertebral artery dissection. She was placed on blood pressure support initially IV pressors subsequent midodrine. She was placed on aspirin and Eliquis for both the vertebral artery dissection as well as to prevent strokes and stump emboli. And she was placed on atorvastatin. MICHELE was not revealing for cause of stroke and an implantable with recorder was placed. Since the hospital she's doing well she does have some evidence of increased stress likely brought on by the stroke and may element of posttraumatic stress disorder from a stroke. She has no significant weakness but occasionally has some numbness on the right side. She is here today for neurologic followup.\par \par Followup May 18, 2021:\par This is a 60-year-old woman who presents today for followup of stroke. She is also having tension headache. She had a left ICA occlusion and a right vertebral artery dissection. She is overall doing well and has some intermittent tingling episodes in her body but other than that appears to be doing well. She is back to exercising and running. She is taking midodrine her blood pressure is better controlled in the one teens. She has a loop recorder and states that there's been no abnormal rhythm detected. She is pending a repeat MRA to further evaluate the vertebral artery dissection. She is here today for neurologic followup.\par \par Followup June 17, 2021:\par This is a 60-year-old woman who presents today for neurologic followup stroke. She also has tension-type headaches. She had a both left internal carotid artery occlusion as well as a right vertebral artery dissection. She had followup MRAs which showed proved flow through these arteries. She is having migrating paresthesias which may be due to anxiety. She is doing better since started on a very low dose of Xanax. She is here today for neurologic followup.\par \par Followup September 20, 2021:\par This is a 61-year-old woman who presents today for followup of stroke. She had a stroke a little over 6 months ago. She was found to have a left ICA occlusion right vertebral artery dissection. Repeat MRA of the [place] done September 8 was unremarkable. She continues to half a tension type headaches. She is feeling overall better however and his return to running. She stress currently running 2-1/2-3 miles but has hopes to run the half marathon. She is here today for neurologic followup.\par \par Followup December 27, 2021:\par This is a 61-year-old woman who presents today for followup of stroke. She had a stroke in February of this year. She had a left ICA occlusion right vertebral artery dissection. She had unremarkable MRA of the [place] in September. She's overall doing well. She did have shingles for which he took Lyrica which she could not tolerate. Eventually after 7 weeks the pain from the shingles ended. She is here today for followup.\par \par Follow-up April 27, 2022:\par This is a 61-year-old woman who presents today for follow-up of stroke.  She had a stroke a year ago in February due to left ICA occlusion and right vertebral dissections.  Recently she had an unremarkable MRA of the [place] last September.  She continues to have intermittent paresthesias primarily on the right side.  This is followed by an eye pressure in the temporal aspect of the right eye.  She has been to the ophthalmologist who feels this may be neurologic and related to her previous stroke.  She reports because of the tingling and eye pain she feels uncomfortable at times and has to leave work.  This is adversely affecting her job and making her consider to retire with disability.  She is here today for neurologic follow-up.\par \par Follow-up October 11, 2022:\par This is a 62-year-old woman  who presents today she had a stroke about a year and a half ago  with history of stroke.  Due to left ICA occlusion and right vertebral artery dissection.  She has been recently doing well other than some symptoms possibly relatable to anxiety.  She had called about a month and a half ago with complaints of joint pains.  She thought it might be due to Lipitor.  She held the Lipitor for a few days and then took 2 full-strength aspirin.  The joint pain resolved and she restarted the Lipitor without any recurrence of the pain.  She is here today also noting that she finally stopped working.  She is here today for neurologic follow-up.

## 2022-10-12 LAB — HPV HIGH+LOW RISK DNA PNL CVX: DETECTED

## 2022-10-12 NOTE — PLAN
[FreeTextEntry1] : will f/u on results from pap test.\par patient to f/u for her annual visit in april 2023.\par patient is going to [facility] until march.\par all her questions were answered agreeable with plan.

## 2022-10-12 NOTE — PLAN
[FreeTextEntry1] : will f/u on results from pap test.\par patient to f/u for her annual visit in april 2023.\par patient is going to [facility] until march.\par all her questions were answered agreeable with plan.

## 2022-10-17 LAB — CYTOLOGY CVX/VAG DOC THIN PREP: NORMAL

## 2023-04-10 ENCOUNTER — APPOINTMENT (OUTPATIENT)
Dept: NEUROLOGY | Facility: CLINIC | Age: 63
End: 2023-04-10
Payer: COMMERCIAL

## 2023-04-10 DIAGNOSIS — I77.74 DISSECTION OF VERTEBRAL ARTERY: ICD-10-CM

## 2023-04-13 PROBLEM — I77.74 VERTEBRAL ARTERY DISSECTION: Status: ACTIVE | Noted: 2021-02-23

## 2023-04-18 ENCOUNTER — APPOINTMENT (OUTPATIENT)
Dept: NEUROLOGY | Facility: CLINIC | Age: 63
End: 2023-04-18
Payer: COMMERCIAL

## 2023-04-18 VITALS
SYSTOLIC BLOOD PRESSURE: 118 MMHG | BODY MASS INDEX: 23.56 KG/M2 | DIASTOLIC BLOOD PRESSURE: 70 MMHG | WEIGHT: 120 LBS | HEIGHT: 60 IN

## 2023-04-18 DIAGNOSIS — R41.0 DISORIENTATION, UNSPECIFIED: ICD-10-CM

## 2023-04-18 PROCEDURE — 99214 OFFICE O/P EST MOD 30 MIN: CPT

## 2023-04-18 RX ORDER — ASPIRIN 81 MG
81 TABLET, DELAYED RELEASE (ENTERIC COATED) ORAL
Refills: 0 | Status: ACTIVE | COMMUNITY

## 2023-04-18 RX ORDER — ATORVASTATIN CALCIUM 40 MG/1
40 TABLET, FILM COATED ORAL DAILY
Refills: 0 | Status: ACTIVE | COMMUNITY

## 2023-04-18 NOTE — HISTORY OF PRESENT ILLNESS
[FreeTextEntry1] : Post hospital followup March 16, 2021:\par This is a 60-year-old woman who presents today for neurologic followup after being seen in the hospital a stroke in February. She was a transfer from [facility] where she received t-PA. 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 She had another event in the hospital possibly due to stump emboli versus hypotension. Repeat imaging demonstrated a right vertebral artery dissection. She was placed on blood pressure support initially IV pressors subsequent midodrine. She was placed on aspirin and Eliquis for both the vertebral artery dissection as well as to prevent strokes and stump emboli. And she was placed on atorvastatin. MICHELE was not revealing for cause of stroke and an implantable with recorder was placed. Since the hospital she's doing well she does have some evidence of increased stress likely brought on by the stroke and may element of posttraumatic stress disorder from a stroke. She has no significant weakness but occasionally has some numbness on the right side. She is here today for neurologic followup.\par \par Followup May 18, 2021:\par This is a 60-year-old woman who presents today for followup of stroke. She is also having tension headache. She had a left ICA occlusion and a right vertebral artery dissection. She is overall doing well and has some intermittent tingling episodes in her body but other than that appears to be doing well. She is back to exercising and running. She is taking midodrine her blood pressure is better controlled in the one teens. She has a loop recorder and states that there's been no abnormal rhythm detected. She is pending a repeat MRA to further evaluate the vertebral artery dissection. She is here today for neurologic followup.\par \par Followup June 17, 2021:\par This is a 60-year-old woman who presents today for neurologic followup stroke. She also has tension-type headaches. She had a both left internal carotid artery occlusion as well as a right vertebral artery dissection. She had followup MRAs which showed proved flow through these arteries. She is having migrating paresthesias which may be due to anxiety. She is doing better since started on a very low dose of Xanax. She is here today for neurologic followup.\par \par Followup September 20, 2021:\par This is a 61-year-old woman who presents today for followup of stroke. She had a stroke a little over 6 months ago. She was found to have a left ICA occlusion right vertebral artery dissection. Repeat MRA of the [place] done September 8 was unremarkable. She continues to half a tension type headaches. She is feeling overall better however and his return to running. She stress currently running 2-1/2-3 miles but has hopes to run the half marathon. She is here today for neurologic followup.\par \par Followup December 27, 2021:\par This is a 61-year-old woman who presents today for followup of stroke. She had a stroke in February of this year. She had a left ICA occlusion right vertebral artery dissection. She had unremarkable MRA of the [place] in September. She's overall doing well. She did have shingles for which he took Lyrica which she could not tolerate. Eventually after 7 weeks the pain from the shingles ended. She is here today for followup.\par \par Follow-up April 27, 2022:\par This is a 61-year-old woman who presents today for follow-up of stroke.  She had a stroke a year ago in February due to left ICA occlusion and right vertebral dissections.  Recently she had an unremarkable MRA of the [place] last September.  She continues to have intermittent paresthesias primarily on the right side.  This is followed by an eye pressure in the temporal aspect of the right eye.  She has been to the ophthalmologist who feels this may be neurologic and related to her previous stroke.  She reports because of the tingling and eye pain she feels uncomfortable at times and has to leave work.  This is adversely affecting her job and making her consider to retire with disability.  She is here today for neurologic follow-up.\par \par Follow-up October 11, 2022:\par This is a 62-year-old woman  who presents today she had a stroke about a year and a half ago  with history of stroke.  Due to left ICA occlusion and right vertebral artery dissection.  She has been recently doing well other than some symptoms possibly relatable to anxiety.  She had called about a month and a half ago with complaints of joint pains.  She thought it might be due to Lipitor.  She held the Lipitor for a few days and then took 2 full-strength aspirin.  The joint pain resolved and she restarted the Lipitor without any recurrence of the pain.  She is here today also noting that she finally stopped working.  She is here today for neurologic follow-up.\par \par Follow-up April 18, 2023:\par This is a 62-year-old woman who presents today with history of stroke.  She is also having new issues of confusion status post COVID infection in gastroenteritis.  She is over the COVID infection however is still dealing with lingering GI symptoms.  She also has an incredible amount of stress.  She finds that she is more forgetful and confused.  She is writing the wrong dates down having trouble remembering and keeping track of simple things.  She states that even when coming here today she had to think a bit about how to get here when she has been here several times  Over the years.  She is here today for neurologic reevaluation.

## 2023-04-18 NOTE — REASON FOR VISIT
[Follow-Up: _____] : a [unfilled] follow-up visit [FreeTextEntry1] : New issues of confusion status post COVID infection also with tremor

## 2023-04-18 NOTE — ASSESSMENT
[FreeTextEntry1] : This is a 62-year-old woman with history of stroke and vertebral artery dissection in the past who is presenting today with worsening confusion following COVID infection as well as a GI infection.  At this point she may have brain fog secondary to COVID but she is also under an extreme amount of stress which may be playing a role in her confusion.  However given her history of stroke and vertebral artery dissection I would like to do an MRI of her brain to further evaluate for possible stroke I will see her back in 6 months but call her with the results of the MRI once available.

## 2023-04-18 NOTE — CONSULT LETTER
[Dear  ___] : Dear  [unfilled], [Courtesy Letter:] : I had the pleasure of seeing your patient, [unfilled], in my office today. [Please see my note below.] : Please see my note below. [Consult Closing:] : Thank you very much for allowing me to participate in the care of this patient.  If you have any questions, please do not hesitate to contact me. [Sincerely,] : Sincerely, [FreeTextEntry3] : Sheldon Randolph M.D., Ph.D. DPN-N\par [facility]\par Neurology at [place]\par Medical Director of Stroke Services\par [facility]\par

## 2023-04-18 NOTE — REVIEW OF SYSTEMS
[As Noted in HPI] : as noted in HPI [Confused or Disoriented] : confusion [Negative] : Heme/Lymph [de-identified] : Tremor at times

## 2023-04-26 ENCOUNTER — APPOINTMENT (OUTPATIENT)
Dept: GASTROENTEROLOGY | Facility: CLINIC | Age: 63
End: 2023-04-26
Payer: COMMERCIAL

## 2023-04-26 ENCOUNTER — APPOINTMENT (OUTPATIENT)
Dept: GASTROENTEROLOGY | Facility: CLINIC | Age: 63
End: 2023-04-26

## 2023-04-26 VITALS
HEIGHT: 62 IN | WEIGHT: 123 LBS | SYSTOLIC BLOOD PRESSURE: 127 MMHG | DIASTOLIC BLOOD PRESSURE: 77 MMHG | BODY MASS INDEX: 22.63 KG/M2 | HEART RATE: 61 BPM

## 2023-04-26 DIAGNOSIS — R10.12 LEFT UPPER QUADRANT PAIN: ICD-10-CM

## 2023-04-26 DIAGNOSIS — K58.2 MIXED IRRITABLE BOWEL SYNDROME: ICD-10-CM

## 2023-04-26 PROCEDURE — 99205 OFFICE O/P NEW HI 60 MIN: CPT

## 2023-04-26 NOTE — HISTORY OF PRESENT ILLNESS
[FreeTextEntry1] : Cadence Mackey is a 62 year old female PMH CVA 2 years ago no residual deceits on 81MG ASA daily presents today for evaluation of multiple complaints. Pt notes symptoms began in December, pt was in [place] at the time and had COVID. Then had 10 days of nausea, vomiting, and diarrhea as did several other people in the area presumed stomach virus. Went to hospital in [facility], was given amoxicillin. Since then, pt feels her GI tract never returned to normal. She now experiences intermittent LUQ and epigastric pain, not associated with specific food triggers. Also notes she fluctuates between diarrhea and constipation often. Denies ever experiencing overt bleeding such as melena or hematochezia. Went to see another GI doctor yesterday who tested stool which indicated E. coli, she was given three days of azithromycin.

## 2023-04-26 NOTE — REVIEW OF SYSTEMS
[Abdominal Pain] : abdominal pain [Vomiting] : no vomiting [Constipation] : constipation [Diarrhea] : diarrhea [Heartburn] : no heartburn [Melena (black stool)] : no melena [Bleeding] : no bleeding [Fecal Incontinence (soiling)] : no fecal incontinence [Bloating (gassiness)] : bloating [Negative] : Heme/Lymph

## 2023-04-26 NOTE — PHYSICAL EXAM
[Alert] : alert [Healthy Appearing] : healthy appearing [Sclera] : the sclera and conjunctiva were normal [Bowel Sounds] : normal bowel sounds [Diffuse] : diffusely [Abdomen Soft] : soft [Abnormal Walk] : normal gait [Normal Color / Pigmentation] : normal skin color and pigmentation [Oriented To Time, Place, And Person] : oriented to person, place, and time

## 2023-04-28 ENCOUNTER — OUTPATIENT (OUTPATIENT)
Dept: OUTPATIENT SERVICES | Facility: HOSPITAL | Age: 63
LOS: 1 days | End: 2023-04-28
Payer: COMMERCIAL

## 2023-04-28 ENCOUNTER — APPOINTMENT (OUTPATIENT)
Dept: MRI IMAGING | Facility: CLINIC | Age: 63
End: 2023-04-28
Payer: COMMERCIAL

## 2023-04-28 DIAGNOSIS — I77.74 DISSECTION OF VERTEBRAL ARTERY: ICD-10-CM

## 2023-04-28 PROCEDURE — 70551 MRI BRAIN STEM W/O DYE: CPT | Mod: 26

## 2023-04-28 PROCEDURE — 70551 MRI BRAIN STEM W/O DYE: CPT

## 2023-05-02 ENCOUNTER — OUTPATIENT (OUTPATIENT)
Dept: OUTPATIENT SERVICES | Facility: HOSPITAL | Age: 63
LOS: 1 days | End: 2023-05-02
Payer: COMMERCIAL

## 2023-05-02 ENCOUNTER — APPOINTMENT (OUTPATIENT)
Dept: CT IMAGING | Facility: CLINIC | Age: 63
End: 2023-05-02
Payer: COMMERCIAL

## 2023-05-02 DIAGNOSIS — R10.12 LEFT UPPER QUADRANT PAIN: ICD-10-CM

## 2023-05-02 PROCEDURE — 74177 CT ABD & PELVIS W/CONTRAST: CPT

## 2023-05-02 PROCEDURE — 74177 CT ABD & PELVIS W/CONTRAST: CPT | Mod: 26

## 2023-07-20 NOTE — STROKE CODE NOTE - CT PERFORMED
Continue same meds. Dr. Rodriguez office will contact you regarding follow up.      04-Feb-2021 11:09

## 2023-08-08 ENCOUNTER — APPOINTMENT (OUTPATIENT)
Dept: ELECTROPHYSIOLOGY | Facility: CLINIC | Age: 63
End: 2023-08-08
Payer: COMMERCIAL

## 2023-08-08 VITALS
BODY MASS INDEX: 22.08 KG/M2 | HEIGHT: 62 IN | OXYGEN SATURATION: 100 % | SYSTOLIC BLOOD PRESSURE: 119 MMHG | TEMPERATURE: 97.4 F | WEIGHT: 120 LBS | HEART RATE: 55 BPM | DIASTOLIC BLOOD PRESSURE: 61 MMHG

## 2023-08-08 DIAGNOSIS — Z95.818 PRESENCE OF OTHER CARDIAC IMPLANTS AND GRAFTS: ICD-10-CM

## 2023-08-08 PROCEDURE — 93000 ELECTROCARDIOGRAM COMPLETE: CPT | Mod: 59

## 2023-08-08 PROCEDURE — 99214 OFFICE O/P EST MOD 30 MIN: CPT | Mod: 25

## 2023-08-08 RX ORDER — HYOSCYAMINE SULFATE 0.12 MG/1
0.12 TABLET ORAL
Qty: 120 | Refills: 0 | Status: DISCONTINUED | COMMUNITY
Start: 2023-04-26 | End: 2023-08-08

## 2023-08-08 NOTE — CARDIOLOGY SUMMARY
[de-identified] : 08/08/23: NSR with LAE at 52 bpm. No acute ischemic changes.  [de-identified] : CASSY ILR: Interrogation: No arrhythmias.

## 2023-08-08 NOTE — REASON FOR VISIT
[Arrhythmia/ECG Abnorrmalities] : arrhythmia/ECG abnormalities [FreeTextEntry3] : Dr. Parrish [FreeTextEntry1] : Cardiologist: Dr. Lockhart

## 2023-08-08 NOTE — HISTORY OF PRESENT ILLNESS
[FreeTextEntry1] : ANA WALLACE is a 62 year old female with family history of premature CAD and CVA (left MCA & left ICA occlusion s/p thrombolytics on 2/4/21) with no residual deficits s/p MDT ILR implant who presents for follow up.  To summarize her history, she presented to [facility] in 2/4/21 with right sided weakness, right vision loss and aphasia found to have an acute left MCA occlusion for which she was treated with thrombolytics. She was also found to have a left ICA occlusion. She was sent to [facility] and during admission she had a second CVA. She was then found to have a right vertebral dissection. Evaluation was negative for cardiac source of embolus and cause of stroke. She underwent ILR implantation for cryptogenic stroke. She was started on Apixaban for right vertebral artery dissection. She is now on ASA.  She has followed with Dr. Lockhart and the ILR demonstrated no arrhythmias. She has otherwise felt well and has no complaints. She was considering pursuing ILR explant.

## 2023-08-08 NOTE — DISCUSSION/SUMMARY
[FreeTextEntry1] : ANA WALLACE is a 62 year old female with family history of premature CAD, CVA (left MCA & left ICA occlusion s/p thrombolytics on 2/4/21) and hypotension (on Midodrine) with no residual deficits s/p MDT ILR implant who presents for follow up.  She presents for follow up for consideration of removal of the ILR. We discussed pros and cons of removing her ILR. I suggested that she keep the ILR implanted for now in case she were to develop an arrhythmia in the future. She currently gets it interrogated by Dr. Lockhart's office every 3 months as remote monitoring was too expensive. Once her ILR reaches EOS or if she has arrhythmias, I offered to have her follow up wtih me or Dr. Garza at [facility]. She will keep the ILR for one more year and then decide on follow up then.  Recommendations: - Keep ILR implanted for now - Continue Midodrine as per Neurology recommendations to maintain adequate perfusion pressures in the brain  Outpatient follow up with Dr. Lockhart. Can follow up with me or Dr. Garza ([facility]) as needed.  Bora Drake MD, FACC, RS Clinical Cardiac Electrophysiology [EKG obtained to assist in diagnosis and management of assessed problem(s)] : EKG obtained to assist in diagnosis and management of assessed problem(s)

## 2023-08-08 NOTE — PHYSICAL EXAM
[Well Developed] : well developed [Well Nourished] : well nourished [No Acute Distress] : no acute distress [Normal Conjunctiva] : normal conjunctiva [No Respiratory Distress] : no respiratory distress  [Normal Gait] : normal gait [Moves all extremities] : moves all extremities [No Focal Deficits] : no focal deficits [Alert and Oriented] : alert and oriented

## 2023-08-31 ENCOUNTER — APPOINTMENT (OUTPATIENT)
Dept: ELECTROPHYSIOLOGY | Facility: CLINIC | Age: 63
End: 2023-08-31

## 2023-10-03 ENCOUNTER — APPOINTMENT (OUTPATIENT)
Dept: NEUROLOGY | Facility: CLINIC | Age: 63
End: 2023-10-03
Payer: COMMERCIAL

## 2023-10-03 VITALS
HEIGHT: 62 IN | WEIGHT: 120 LBS | DIASTOLIC BLOOD PRESSURE: 60 MMHG | SYSTOLIC BLOOD PRESSURE: 118 MMHG | BODY MASS INDEX: 22.08 KG/M2

## 2023-10-03 DIAGNOSIS — I63.9 CEREBRAL INFARCTION, UNSPECIFIED: ICD-10-CM

## 2023-10-03 PROCEDURE — 99213 OFFICE O/P EST LOW 20 MIN: CPT

## 2023-10-31 ENCOUNTER — APPOINTMENT (OUTPATIENT)
Dept: GASTROENTEROLOGY | Facility: AMBULATORY MEDICAL SERVICES | Age: 63
End: 2023-10-31

## 2023-10-31 ENCOUNTER — APPOINTMENT (OUTPATIENT)
Dept: OBGYN | Facility: CLINIC | Age: 63
End: 2023-10-31
Payer: COMMERCIAL

## 2023-10-31 VITALS
WEIGHT: 123 LBS | BODY MASS INDEX: 24.15 KG/M2 | HEIGHT: 60 IN | HEART RATE: 62 BPM | SYSTOLIC BLOOD PRESSURE: 135 MMHG | DIASTOLIC BLOOD PRESSURE: 83 MMHG

## 2023-10-31 DIAGNOSIS — Z01.419 ENCOUNTER FOR GYNECOLOGICAL EXAMINATION (GENERAL) (ROUTINE) W/OUT ABNORMAL FINDINGS: ICD-10-CM

## 2023-10-31 DIAGNOSIS — Z00.00 ENCOUNTER FOR GENERAL ADULT MEDICAL EXAMINATION W/OUT ABNORMAL FINDINGS: ICD-10-CM

## 2023-10-31 LAB
BILIRUB UR QL STRIP: NORMAL
CLARITY UR: CLEAR
COLLECTION METHOD: NORMAL
DATE COLLECTED: NORMAL
GLUCOSE UR-MCNC: NORMAL
HCG UR QL: 0 EU/DL
HEMOCCULT SP1 STL QL: NEGATIVE
HEMOGLOBIN: 13.4
HGB UR QL STRIP.AUTO: NORMAL
KETONES UR-MCNC: NORMAL
LEUKOCYTE ESTERASE UR QL STRIP: NORMAL
NITRITE UR QL STRIP: NORMAL
PH UR STRIP: 5
PROT UR STRIP-MCNC: NORMAL
QUALITY CONTROL: YES
SP GR UR STRIP: 1

## 2023-10-31 PROCEDURE — 82270 OCCULT BLOOD FECES: CPT

## 2023-10-31 PROCEDURE — 99396 PREV VISIT EST AGE 40-64: CPT | Mod: 25

## 2023-10-31 PROCEDURE — 85018 HEMOGLOBIN: CPT | Mod: QW

## 2023-10-31 PROCEDURE — 81003 URINALYSIS AUTO W/O SCOPE: CPT | Mod: QW

## 2023-11-02 LAB — HPV HIGH+LOW RISK DNA PNL CVX: NOT DETECTED

## 2023-11-08 DIAGNOSIS — B96.89 ACUTE VAGINITIS: ICD-10-CM

## 2023-11-08 DIAGNOSIS — N76.0 ACUTE VAGINITIS: ICD-10-CM

## 2023-11-08 LAB — CYTOLOGY CVX/VAG DOC THIN PREP: ABNORMAL

## 2023-11-08 RX ORDER — METRONIDAZOLE 7.5 MG/G
0.75 GEL VAGINAL
Qty: 1 | Refills: 0 | Status: ACTIVE | COMMUNITY
Start: 2023-11-08 | End: 1900-01-01

## 2023-11-09 ENCOUNTER — NON-APPOINTMENT (OUTPATIENT)
Age: 63
End: 2023-11-09

## 2023-12-21 NOTE — PATIENT PROFILE ADULT - .
Please review. Protocol failed/ No protocol      Requested Prescriptions   Pending Prescriptions Disp Refills    ROSUVASTATIN 20 MG Oral Tab [Pharmacy Med Name: Rosuvastatin Calcium 20 MG Oral Tablet] 90 tablet 0     Sig: Take 1 tablet by mouth nightly       Cholesterol Medication Protocol Failed - 12/21/2023  7:38 AM        Failed - Last LDL < 130     Lab Results   Component Value Date     (H) 09/21/2023             Passed - ALT in past 12 months        Passed - LDL in past 12 months        Passed - Last ALT < 80     Lab Results   Component Value Date    ALT 34 09/21/2023             Passed - In person appointment or virtual visit in the past 12 mos or appointment in next 3 mos     Recent Outpatient Visits              1 week ago Gastroesophageal reflux disease, unspecified whether esophagitis present    Davey Gomez APRN    Office Visit    1 month ago Right shoulder pain, unspecified chronicity    Gonzalo Gomez MD    Office Visit    2 months ago Medication monitoring encounter    Tereza Jacobs, [address] [place], [place] MD Ezequiel    Office Visit    3 months ago Type 2 diabetes mellitus with hyperglycemia, without long-term current use of insulin ([facility] [address]    [address], Bethanie Cabot, MD    Office Visit    8 months ago Acute idiopathic gout of left foot    Tereza Jacobs, [address] [place], [place] MD Ezequiel    Office Visit          Future Appointments         Provider Department Appt Notes    In 1 month MD Tereza Flores, [address], [employer] in arm    In 3 months MD Tereza Rodriguez, [address], [place] 6 mo f/u    In 10 months Macarena'carin Bowles, [address], [address], [address] [facility] @ [address] [place] [address] Future Appointments         Provider Department Appt Notes    In 1 month Perri Sylvester MD [address], [address], [place] in arm    In 3 months Sudha Rico MD [address], [address], [place] 6 mo f/u    In 10 months [employer], [address], [address], [address] [facility]             Recent Outpatient Visits              1 week ago Gastroesophageal reflux disease, unspecified whether esophagitis present    [address] [place] [address] ALVIN Cuadra    Office Visit    1 month ago Right shoulder pain, unspecified chronicity    [address] [place], [place] [address]Derik Elihue Mustache, MD    Office Visit    2 months ago Medication monitoring encounter    [address], [address], Radha Odom MD    Office Visit    3 months ago Type 2 diabetes mellitus with hyperglycemia, without long-term current use of insulin [facility])    [address] [place] [address], Sergo Gongora MD    Office Visit    8 months ago Acute idiopathic gout of left foot    [address], [address], Elias Esqueda MD    Office Visit 04-Feb-2021 18:45:37

## 2024-02-23 ENCOUNTER — APPOINTMENT (OUTPATIENT)
Dept: OBGYN | Facility: CLINIC | Age: 64
End: 2024-02-23
Payer: COMMERCIAL

## 2024-02-23 DIAGNOSIS — N89.8 OTHER SPECIFIED NONINFLAMMATORY DISORDERS OF VAGINA: ICD-10-CM

## 2024-02-23 PROCEDURE — 99213 OFFICE O/P EST LOW 20 MIN: CPT

## 2024-02-23 NOTE — DISCUSSION/SUMMARY
[FreeTextEntry1] : vaginitis panel sent treatment based on results if negative, consider vaginal estrogen for vaginal atrophy AQA Pt verbalized understanding

## 2024-02-23 NOTE — PHYSICAL EXAM
[Chaperone Declined] : Patient declined chaperone [Normal] : cervix [Atrophy] : atrophy [No Bleeding] : there was no active vaginal bleeding

## 2024-02-29 LAB
A VAGINAE DNA VAG QL NAA+PROBE: NORMAL
BVAB2 DNA VAG QL NAA+PROBE: NORMAL
C KRUSEI DNA VAG QL NAA+PROBE: NEGATIVE
CANDIDA DNA VAG QL NAA+PROBE: NEGATIVE
MEGA1 DNA VAG QL NAA+PROBE: NORMAL
T VAGINALIS RRNA SPEC QL NAA+PROBE: NEGATIVE

## 2024-04-08 ENCOUNTER — APPOINTMENT (OUTPATIENT)
Dept: NEUROLOGY | Facility: CLINIC | Age: 64
End: 2024-04-08

## 2024-05-31 ENCOUNTER — EMERGENCY (EMERGENCY)
Facility: HOSPITAL | Age: 64
LOS: 0 days | Discharge: HOME | End: 2024-05-31
Attending: EMERGENCY MEDICINE
Payer: COMMERCIAL

## 2024-05-31 VITALS
RESPIRATION RATE: 18 BRPM | TEMPERATURE: 98 F | HEART RATE: 60 BPM | OXYGEN SATURATION: 100 % | SYSTOLIC BLOOD PRESSURE: 126 MMHG | DIASTOLIC BLOOD PRESSURE: 77 MMHG | WEIGHT: 128.75 LBS

## 2024-05-31 VITALS — WEIGHT: 141.1 LBS | HEIGHT: 62 IN

## 2024-05-31 DIAGNOSIS — S61.214A LACERATION WITHOUT FOREIGN BODY OF RIGHT RING FINGER WITHOUT DAMAGE TO NAIL, INITIAL ENCOUNTER: ICD-10-CM
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
## 2024-05-31 DIAGNOSIS — Z23 ENCOUNTER FOR IMMUNIZATION: ICD-10-CM

## 2024-05-31 DIAGNOSIS — Z86.73 PERSONAL HISTORY OF TRANSIENT ISCHEMIC ATTACK (TIA), AND CEREBRAL INFARCTION WITHOUT RESIDUAL DEFICITS: ICD-10-CM
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
## 2024-05-31 DIAGNOSIS — Z79.82 LONG TERM (CURRENT) USE OF ASPIRIN: ICD-10-CM

## 2024-05-31 PROCEDURE — 12002 RPR S/N/AX/GEN/TRNK2.6-7.5CM: CPT

## 2024-05-31 PROCEDURE — 90715 TDAP VACCINE 7 YRS/> IM: CPT

## 2024-05-31 PROCEDURE — 99284 EMERGENCY DEPT VISIT MOD MDM: CPT | Mod: 25

## 2024-05-31 PROCEDURE — 90471 IMMUNIZATION ADMIN: CPT

## 2024-05-31 PROCEDURE — 73140 X-RAY EXAM OF FINGER(S): CPT | Mod: RT

## 2024-05-31 PROCEDURE — 99283 EMERGENCY DEPT VISIT LOW MDM: CPT | Mod: 25

## 2024-05-31 PROCEDURE — 73140 X-RAY EXAM OF FINGER(S): CPT | Mod: 26,RT

## 2024-05-31 RX ORDER — TETANUS TOXOID, REDUCED DIPHTHERIA TOXOID AND ACELLULAR PERTUSSIS VACCINE, ADSORBED 5; 2.5; 8; 8; 2.5 [IU]/.5ML; [IU]/.5ML; UG/.5ML; UG/.5ML; UG/.5ML
0.5 SUSPENSION INTRAMUSCULAR ONCE
Refills: 0 | Status: COMPLETED | OUTPATIENT
Start: 2024-05-31 | End: 2024-05-31

## 2024-05-31 RX ADMIN — TETANUS TOXOID, REDUCED DIPHTHERIA TOXOID AND ACELLULAR PERTUSSIS VACCINE, ADSORBED 0.5 MILLILITER(S): 5; 2.5; 8; 8; 2.5 SUSPENSION INTRAMUSCULAR at 19:32

## 2024-05-31 NOTE — ED STATDOCS - OBJECTIVE STATEMENT
62 y/o female with PMHx of CVA on aspirin presents to the ED c/o crush injury to right 4th finger that she sustained while closing her garage done when it became caught. Pt unsure of last tetanus.

## 2024-05-31 NOTE — ED ADULT TRIAGE NOTE - CHIEF COMPLAINT QUOTE
patient sent from urgent care with injury to right ring finger after garage door closed on finger.  laceration noted , dressing applied in triage.  unknown last tetanus.

## 2024-05-31 NOTE — ED STATDOCS - NSFOLLOWUPINSTRUCTIONS_ED_ALL_ED_FT
1 .Follow up with your PMD within 48-72 hours for wound check.   2. Keep sutures clean and dry for 24 hours then clean with soap and water daily.    3. Apply bacitracin and cover.   4. Return to the emergency department for suture removal in 7 -10 days.   5. Return to the ED for increased pain, surrounding redness, streaking (red lines), pus, drainage from wound, swelling, fever, chills OR ANY NEW CONCERNING symptoms.   6. You received a Tdap (tetanus, diphtheria and pertussis) shot today, which can make your arm sore.    Laceration    WHAT YOU NEED TO KNOW:    A laceration is an injury to the skin and the soft tissue underneath it. Lacerations happen when you are cut or hit by something. They can happen anywhere on the body.     DISCHARGE INSTRUCTIONS:    Return to the emergency department if:     You have heavy bleeding or bleeding that does not stop after 10 minutes of holding firm, direct pressure over the wound.       Your wound opens up.     Contact your healthcare provider if:     You have a fever or chills.       Your laceration is red, warm, or swollen.      You have red streaks on your skin coming from your wound.      You have white or yellow drainage from the wound that smells bad.      You have pain that gets worse, even after treatment.       You have questions or concerns about your condition or care.     Medicines:     Prescription pain medicine may be given. Ask how to take this medicine safely.       Antibiotics help treat or prevent a bacterial infection.       Take your medicine as directed. Contact your healthcare provider if you think your medicine is not helping or if you have side effects. Tell him or her if you are allergic to any medicine. Keep a list of the medicines, vitamins, and herbs you take. Include the amounts, and when and why you take them. Bring the list or the pill bottles to follow-up visits. Carry your medicine list with you in case of an emergency.    Care for your wound as directed:     Do not get your wound wet until your healthcare provider says it is okay. Do not soak your wound in water. Do not go swimming until your healthcare provider says it is okay. Carefully wash the wound with soap and water. Gently pat the area dry or allow it to air dry.       Change your bandages when they get wet, dirty, or after washing. Apply new, clean bandages as directed. Do not apply elastic bandages or tape too tight. Do not put powders or lotions over your incision.       Apply antibiotic ointment as directed. Your healthcare provider may give you antibiotic ointment to put over your wound if you have stitches. If you have strips of tape over your incision, let them dry up and fall off on their own. If they do not fall off within 14 days, gently remove them. If you have glue over your wound, do not remove or pick at it. If your glue comes off, do not replace it with glue that you have at home.       Check your wound every day for signs of infection such as swelling, redness, or pus.     Self-care:     Apply ice on your wound for 15 to 20 minutes every hour or as directed. Use an ice pack, or put crushed ice in a plastic bag. Cover it with a towel. Ice helps prevent tissue damage and decreases swelling and pain.      Use a splint as directed. A splint will decrease movement and stress on your wound. It may help it heal faster. A splint may be used for lacerations over joints or areas of your body that bend. Ask your healthcare provider how to apply and remove a splint.       Decrease scarring of your wound by applying ointments as directed. Do not apply ointments until your healthcare provider says it is okay. You may need to wait until your wound is healed. Ask which ointment to buy and how often to use it. After your wound is healed, use sunscreen over the area when you are out in the sun. You should do this for at least 6 months to 1 year after your injury.     Follow up with your healthcare provider as directed: You may need to follow up in 24 to 48 hours to have your wound checked for infection. You will need to return in 3 to 14 days if you have stitches or staples so they can be removed. Care for your wound as directed to prevent infection and help it heal. Write down your questions so you remember to ask them during your visits.

## 2024-05-31 NOTE — ED STATDOCS - PROGRESS NOTE DETAILS
63-year-old female with past medical history of CVA on aspirin presents emergency department with a crush injury to  right fourth finger from closing her garage door.  Tetanus status is not up-to-date.  No other complaints.  Physical exam demonstrates 3 cm laceration to distal pad of right fourth finger, neurovascularly intact, rest of exam unremarkable.  X-ray performed, no fractures appreciated.  Laceration repaired by myself.  Patient tolerated procedure well.  Stable for discharge home after tetanus vaccine given with wound care instructions.  Strict return precautions were discussed.  All questions and concerns were addressed at this time. - Juanis Newsome PA-C

## 2024-05-31 NOTE — ED STATDOCS - MUSCULOSKELETAL, MLM
range of motion is not limited and there is no muscle tenderness. FROM right 4th finger, well perfused, no FB, no signs of infection

## 2024-05-31 NOTE — ED STATDOCS - CLINICAL SUMMARY MEDICAL DECISION MAKING FREE TEXT BOX
I agree with note by PA. Tdap updated. Laceration repair was performed in department.  Discharged home in good condition with strict return precautions.  Patient verbalized understanding agree with plan.

## 2024-05-31 NOTE — ED STATDOCS - PATIENT PORTAL LINK FT
You can access the FollowMyHealth Patient Portal offered by [facility] by registering at the following website: [email or web address]. By joining [facility]’s FollowMyHealth portal, you will also be able to view your health information using other applications (apps) compatible with our system.

## 2024-08-27 ENCOUNTER — APPOINTMENT (OUTPATIENT)
Dept: ELECTROPHYSIOLOGY | Facility: CLINIC | Age: 64
End: 2024-08-27

## 2024-11-12 ENCOUNTER — EMERGENCY (EMERGENCY)
Facility: HOSPITAL | Age: 64
LOS: 0 days | Discharge: HOME | End: 2024-11-13
Attending: EMERGENCY MEDICINE
Payer: MEDICARE

## 2024-11-12 VITALS
HEART RATE: 60 BPM | OXYGEN SATURATION: 98 % | DIASTOLIC BLOOD PRESSURE: 84 MMHG | SYSTOLIC BLOOD PRESSURE: 128 MMHG | RESPIRATION RATE: 18 BRPM | TEMPERATURE: 98 F

## 2024-11-12 VITALS — WEIGHT: 129.85 LBS

## 2024-11-12 DIAGNOSIS — R09.81 NASAL CONGESTION: ICD-10-CM

## 2024-11-12 DIAGNOSIS — Z12.39 ENCOUNTER FOR OTHER SCREENING FOR MALIGNANT NEOPLASM OF BREAST: ICD-10-CM

## 2024-11-12 DIAGNOSIS — J06.9 ACUTE UPPER RESPIRATORY INFECTION, UNSPECIFIED: ICD-10-CM

## 2024-11-12 DIAGNOSIS — Z86.73 PERSONAL HISTORY OF TRANSIENT ISCHEMIC ATTACK (TIA), AND CEREBRAL INFARCTION WITHOUT RESIDUAL DEFICITS: ICD-10-CM

## 2024-11-12 DIAGNOSIS — R05.9 COUGH, UNSPECIFIED: ICD-10-CM

## 2024-11-12 DIAGNOSIS — B97.89 OTHER VIRAL AGENTS AS THE CAUSE OF DISEASES CLASSIFIED ELSEWHERE: ICD-10-CM

## 2024-11-12 PROCEDURE — 99284 EMERGENCY DEPT VISIT MOD MDM: CPT | Mod: 25

## 2024-11-12 PROCEDURE — 99283 EMERGENCY DEPT VISIT LOW MDM: CPT

## 2024-11-12 RX ORDER — VALACYCLOVIR HYDROCHLORIDE 500 MG/1
1 TABLET ORAL
Qty: 21 | Refills: 0
Start: 2024-11-12 | End: 2024-11-18

## 2024-11-12 RX ORDER — VALACYCLOVIR HYDROCHLORIDE 500 MG/1
1000 TABLET ORAL ONCE
Refills: 0 | Status: COMPLETED | OUTPATIENT
Start: 2024-11-12 | End: 2024-11-12

## 2024-11-12 RX ADMIN — VALACYCLOVIR HYDROCHLORIDE 1000 MILLIGRAM(S): 500 TABLET ORAL at 23:59

## 2024-11-12 NOTE — ED STATDOCS - CLINICAL SUMMARY MEDICAL DECISION MAKING FREE TEXT BOX
Patient with 2 separate diseases today.  She has got a shingles on the right side of her back, with typical pain, starting patient on Valtrex.  She also has upper respiratory symptoms.  Likely has a viral bronchitis.  Lungs are clear, no respiratory stress, speaking full sentences.  She is afebrile.  I do not feel that she needs a chest x-ray at this time.  Have low concern for pneumonia or other disease process.  Recommend continue supportive care for this.  Will be discharged home in good condition, recommend close outpatient follow-up.  Strict return precautions given for worsening.  Patient verbalized understanding and agreed to plan.

## 2024-11-12 NOTE — ED ADULT TRIAGE NOTE - CHIEF COMPLAINT QUOTE
complains of burning pain x 3 days to right breast radiating to right side back, states pain feels similar to shingles she had in 2022. also complains of congestion and mild cough x 1 week. denies fever.

## 2024-11-12 NOTE — ED STATDOCS - PHYSICAL EXAMINATION
Physical Exam:  Gen: NAD, non-toxic appearing, able to ambulate without assistance  Head: NCAT  HEENT: EOMI, PEERLA, normal conjunctiva, tongue midline, oral mucosa moist  Lung: CTAB, no respiratory distress, no wheezes/rhonchi/rales B/L, speaking in full sentences  CV: RRR, no murmurs, rubs or gallops, distal pulses 2+ b/l  Abd: soft, nontender, no distention, no guarding, no rigidity, no rebound tenderness  MSK: no visible deformities, ROM normal in UE/LE  Skin: Warm, well perfused, two small vesicles on the right back.   Psych: normal affect, calm

## 2024-11-12 NOTE — ED STATDOCS - OBJECTIVE STATEMENT
64 year old female with PMHx of shingles, CVA presents to ED c/o discomfort and rash to the right side breast since last Thursday. she describes the discomfort as a burning sensation. patient states they have had a similar condition or occurrence in the past, when she had shingles in October 2022. patient states her  noticed a mild rash to the back. she also complains of congestion and cough.

## 2024-11-12 NOTE — ED STATDOCS - PATIENT PORTAL LINK FT
GBS negative - please document in overview and plan You can access the FollowMyHealth Patient Portal offered by [facility] by registering at the following website: [email or web address]. By joining [facility]’s FollowMyHealth portal, you will also be able to view your health information using other applications (apps) compatible with our system.

## 2024-11-12 NOTE — ED STATDOCS - DISCHARGE DATE
Labs did not get completed per lab as not notation to have had them done at [facility].  Patient per conversation states she will just not take th rinvoq if she needs to repeat labs as she did them two weeks ago and is not repeating them.   12-Nov-2024

## 2024-11-12 NOTE — ED STATDOCS - NSFOLLOWUPINSTRUCTIONS_ED_ALL_ED_FT
Take medication as prescribed.    Follow up with your doctor in 1 week.    If you have any worsening, please return to ED.

## 2024-11-26 PROBLEM — Z12.4 CERVICAL CANCER SCREENING: Status: ACTIVE | Noted: 2024-11-26

## 2024-11-26 PROBLEM — Z12.11 COLON CANCER SCREENING: Status: ACTIVE | Noted: 2024-11-26

## 2024-12-03 ENCOUNTER — APPOINTMENT (OUTPATIENT)
Dept: OBGYN | Facility: CLINIC | Age: 64
End: 2024-12-03
Payer: MEDICARE

## 2024-12-03 VITALS
HEIGHT: 60 IN | WEIGHT: 123 LBS | SYSTOLIC BLOOD PRESSURE: 137 MMHG | HEART RATE: 66 BPM | BODY MASS INDEX: 24.15 KG/M2 | DIASTOLIC BLOOD PRESSURE: 76 MMHG

## 2024-12-03 DIAGNOSIS — Z12.11 ENCOUNTER FOR SCREENING FOR MALIGNANT NEOPLASM OF COLON: ICD-10-CM

## 2024-12-03 DIAGNOSIS — Z12.4 ENCOUNTER FOR SCREENING FOR MALIGNANT NEOPLASM OF CERVIX: ICD-10-CM

## 2024-12-03 DIAGNOSIS — Z01.419 ENCOUNTER FOR GYNECOLOGICAL EXAMINATION (GENERAL) (ROUTINE) W/OUT ABNORMAL FINDINGS: ICD-10-CM

## 2024-12-03 LAB
BILIRUB UR QL STRIP: NORMAL
CLARITY UR: CLEAR
COLLECTION METHOD: NORMAL
GLUCOSE UR-MCNC: NORMAL
HCG UR QL: 0.2 EU/DL
HEMOGLOBIN: 13.9
HGB UR QL STRIP.AUTO: NORMAL
KETONES UR-MCNC: NORMAL
LEUKOCYTE ESTERASE UR QL STRIP: NORMAL
NITRITE UR QL STRIP: NORMAL
PH UR STRIP: 5.5
PROT UR STRIP-MCNC: NORMAL
SP GR UR STRIP: 1.01

## 2024-12-03 PROCEDURE — 85018 HEMOGLOBIN: CPT | Mod: QW

## 2024-12-03 PROCEDURE — 82270 OCCULT BLOOD FECES: CPT

## 2024-12-03 PROCEDURE — 81003 URINALYSIS AUTO W/O SCOPE: CPT | Mod: QW

## 2024-12-03 PROCEDURE — G0101: CPT

## 2024-12-10 LAB — CYTOLOGY CVX/VAG DOC THIN PREP: NORMAL

## 2024-12-26 ENCOUNTER — EMERGENCY (EMERGENCY)
Facility: HOSPITAL | Age: 64
LOS: 0 days | Discharge: HOME | End: 2024-12-26
Attending: EMERGENCY MEDICINE
Payer: MEDICARE

## 2024-12-26 VITALS
DIASTOLIC BLOOD PRESSURE: 89 MMHG | SYSTOLIC BLOOD PRESSURE: 145 MMHG | TEMPERATURE: 98 F | HEART RATE: 68 BPM | RESPIRATION RATE: 15 BRPM | OXYGEN SATURATION: 100 %

## 2024-12-26 VITALS — WEIGHT: 130.29 LBS
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
## 2024-12-26 DIAGNOSIS — M25.532 PAIN IN LEFT WRIST: ICD-10-CM

## 2024-12-26 DIAGNOSIS — E78.5 HYPERLIPIDEMIA, UNSPECIFIED: ICD-10-CM

## 2024-12-26 DIAGNOSIS — Z86.73 PERSONAL HISTORY OF TRANSIENT ISCHEMIC ATTACK (TIA), AND CEREBRAL INFARCTION WITHOUT RESIDUAL DEFICITS: ICD-10-CM

## 2024-12-26 DIAGNOSIS — S52.502A UNSPECIFIED FRACTURE OF THE LOWER END OF LEFT RADIUS, INITIAL ENCOUNTER FOR CLOSED FRACTURE: ICD-10-CM

## 2024-12-26 PROCEDURE — 25605 CLTX DST RDL FX/EPHYS SEP W/: CPT | Mod: LT

## 2024-12-26 PROCEDURE — 73110 X-RAY EXAM OF WRIST: CPT | Mod: 26,LT

## 2024-12-26 PROCEDURE — 73110 X-RAY EXAM OF WRIST: CPT | Mod: LT

## 2024-12-26 PROCEDURE — 73090 X-RAY EXAM OF FOREARM: CPT | Mod: LT

## 2024-12-26 PROCEDURE — 73090 X-RAY EXAM OF FOREARM: CPT | Mod: 26,LT

## 2024-12-26 PROCEDURE — 99285 EMERGENCY DEPT VISIT HI MDM: CPT | Mod: 25

## 2024-12-26 PROCEDURE — 99284 EMERGENCY DEPT VISIT MOD MDM: CPT

## 2024-12-26 NOTE — ED STATDOCS - PATIENT PORTAL LINK FT
You can access the FollowMyHealth Patient Portal offered by [facility] by registering at the following website: [email or web address]. By joining [facility] [employer]’s FollowMyHealth portal, you will also be able to view your health information using other applications (apps) compatible with our system.

## 2024-12-26 NOTE — ED ADULT NURSE NOTE - NSFALLUNIVINTERV_ED_ALL_ED
Bed/Stretcher in lowest position, wheels locked, appropriate side rails in place/Call bell, personal items and telephone in reach/Instruct patient to call for assistance before getting out of bed/chair/stretcher/Non-slip footwear applied when patient is off stretcher/[place] to call system/Physically safe environment - no spills, clutter or unnecessary equipment/Purposeful proactive rounding/Room/bathroom lighting operational, light cord in reach

## 2024-12-26 NOTE — ED STATDOCS - NSFOLLOWUPINSTRUCTIONS_ED_ALL_ED_FT
Follow up with Dr. Stanford within 1 week -- call office to schedule appointment.  Take Tylenol 650-1000 mg every 6 hours as needed for pain. Do not exceed 4,000 mg in a 24 hour period. Take Ibuprofen 600-800 mg every 6 hours as needed for moderate pain -- take with food.  Return to the ED for new or concerning symptoms.    Wrist Fracture Treated With Immobilization  The bones of the hand, showing a wrist fracture.   A wrist fracture is a break or crack in one of the bones of the wrist. A broken wrist is often treated by wearing a cast, splint, or sling (immobilization). These devices hold the broken pieces in place so they can heal.    What are the causes?  This condition may be caused by:  A direct hit on the wrist.  Injury, such as a car crash or falling on an outstretched hand.  Bone conditions, such as osteoporosis.  Falls. This happens more often to older adults.  What increases the risk?  You're more likely to get this condition if:  You do contact sports or high-risk sports such as:  Skiing.  Biking.  Ice skating.  You take steroids.  You've had a fracture before.  You're female.  You're older.  You smoke.  You drink more than three alcoholic beverages a day.  What are the signs or symptoms?  Severe pain that might get worse when gripping, squeezing, or moving your hand or wrist.  Swelling and tenderness.  Bruising.  Not being able to move the wrist or hand normally.  The wrist hanging in an odd position or looking oddly shaped.  Numbness or tingling in the fingers.  How is this diagnosed?  This condition may be diagnosed based on medical history and a physical exam.    You may also have tests such as:  X-rays.  CT scan.  MRI.  How is this treated?  This condition may be treated by:  Wearing a cast, splint for 5 to 6 weeks.  Taking medicine for pain.  Doing exercises.  Follow these instructions at home:  If you have a cast:    Do not put pressure on any part of the cast until it's hard. This may take a few hours.  Do not stick anything inside it to scratch your skin. Doing this can lead to infection.  Check the skin around your cast every day. Tell your health care provider if you see problems.  It's OK to put lotion on dry skin around the cast.  Keep the cast clean and dry.  If you have a splint:    Wear the splint as told by your provider. Take it off only at the times your provider says you can.  Check the skin around it every day.  Loosen the splint if your fingers tingle, are numb, or turn cold and blue.  Keep the splint clean and dry.  Bathing    Do not take baths, swim, or use a hot tub until you're told it's OK. Ask if you can shower.  If your cast or splint isn't waterproof:  Do not let it get wet.  Cover it when you take a bath or shower. Use a cover that doesn't let any water in.  Managing pain, stiffness, and swelling    Bag of ice on a towel on the skin.   Use ice or an ice pack as told.  If you have a splint or sling that you can take off, remove it only as told.  Place a towel between your skin and the ice, or between your cast and the ice.  Leave the ice on for 20 minutes, 2–3 times a day.  If your skin turns red, take off the ice right away to prevent skin damage. The risk of damage is higher if you can't feel pain, heat, or cold.  Move your fingers often to reduce stiffness and swelling.  Raise the injured area above the level of your heart while you are sitting or lying down. Use pillows as needed.  Activity    Do not lift with your injured wrist, or put weight on it, until your provider says it's safe to do so.  Exercise as told.  Ask when it's safe to drive if you have a cast, splint, or sling on your wrist.  Ask what things are safe for you to do at home. Ask when you can go back to work or school.  Medicines    Take your medicines only as told.  You may need to take steps to help treat or prevent trouble pooping (constipation), such as:  Taking medicines to help you poop.  Eating foods high in fiber, like beans, whole grains, and fresh fruits and vegetables.  Drinking more fluids as told.  Ask your provider if it's safe to drive or use machines while taking pain medicine.  General instructions    Do not smoke, vape, or use nicotine or tobacco. Doing this can slow down healing.  Contact a health care provider if:  Your cast or splint is loose or damaged.  You have any new pain, swelling, or bruising.  Your pain, swelling, and bruising do not get better.  You have a fever or chills.  Get help right away if:  Your skin or fingers on your injured arm turn blue or gray.  Your arm feels cold or numb.  You have very bad pain in your injured wrist.  These symptoms may be an emergency. Call 911 right away  Do not wait to see if the symptoms will go away.  Do not drive yourself to the hospital.  This information is not intended to replace advice given to you by your health care provider. Make sure you discuss any questions you have with your health care provider.

## 2024-12-26 NOTE — ED ADULT TRIAGE NOTE - CHIEF COMPLAINT QUOTE
Pt A&OX3, presenting to the ER with c/o wrist pain/injury. Pt [place] being in the city today when she tripped and fall landing on the left wrist. Went to her primary care doctor where they did an X-ray and confirmed a compound fracture.   Pt denies head strike, LOC, numbness or tingling in extremity.

## 2024-12-26 NOTE — ED STATDOCS - CLINICAL SUMMARY MEDICAL DECISION MAKING FREE TEXT BOX
left wrist injury, NV intact, will xray to evaluate  patient with CD from [facility], unable to open films

## 2024-12-26 NOTE — ED STATDOCS - ATTENDING APP SHARED VISIT CONTRIBUTION OF CARE
I,Carlton Turcios MD,  performed the initial face to face bedside interview with this patient regarding history of present illness, review of symptoms and relevant past medical, social and family history.  I completed an independent physical examination.  I was the initial provider who evaluated this patient. I have signed out the follow up of any pending tests (i.e. labs, radiological studies) to the ACP.  I have communicated the patient’s plan of care and disposition with the ACP.

## 2024-12-26 NOTE — ED STATDOCS - PHYSICAL EXAMINATION
aaox3  no external signs of head trauma  neck supple  cta b/l, [id number]  abd soft, non tender    left wrist: ttp distal radius; no deformity; FROM at digits; NV intact  no tenderness to elbow to shoulder

## 2024-12-26 NOTE — ED STATDOCS - PROGRESS NOTE DETAILS
64 year old female presents to the ED c/o left wrist pain s/p mechanical trip and fall. Had outpt XR showing compound fx. PE ttp distal radius, no obvious deformity, NVI. XR performed positive for distal radius fx. Ortho consulted. - Juanis Newsome PA-C Patient seen and splinted by ortho team. Stable for dc home to f/u with Dr. Stanford within 1 week. Strict return precautions were given. All questions and concerns were addressed. - Juanis Newsome PA-C

## 2024-12-26 NOTE — ED STATDOCS - OBJECTIVE STATEMENT
65 yo female who reports hx cva, hld, s/p trip and fall earlier today, with foosh. Patient with injury to left wrist  patient states he went to pmd and was sent for outpatient xrays which showed a fracture  denies any head injury or other injury

## 2024-12-26 NOTE — ED STATDOCS - CARE PROVIDER_API CALL
Miguel A Stanford  [facility]  [address] [place] [address]  Phone: [phone]  Fax: [phone]  Follow Up Time:

## 2024-12-31 ENCOUNTER — APPOINTMENT (OUTPATIENT)
Age: 64
End: 2024-12-31
Payer: MEDICARE

## 2024-12-31 VITALS — HEIGHT: 60 IN | BODY MASS INDEX: 24.15 KG/M2 | WEIGHT: 123 LBS

## 2024-12-31 PROBLEM — S52.592A OTHER CLOSED FRACTURE OF DISTAL END OF LEFT RADIUS, INITIAL ENCOUNTER: Status: ACTIVE | Noted: 2024-12-31

## 2024-12-31 PROCEDURE — 29075 APPL CST ELBW FNGR SHORT ARM: CPT | Mod: LT

## 2024-12-31 PROCEDURE — 99204 OFFICE O/P NEW MOD 45 MIN: CPT | Mod: 25

## 2024-12-31 PROCEDURE — 73110 X-RAY EXAM OF WRIST: CPT | Mod: LT

## 2025-01-06 ENCOUNTER — APPOINTMENT (OUTPATIENT)
Dept: ORTHOPEDIC SURGERY | Facility: CLINIC | Age: 65
End: 2025-01-06
Payer: OTHER MISCELLANEOUS

## 2025-01-06 VITALS — BODY MASS INDEX: 24.02 KG/M2 | WEIGHT: 123 LBS

## 2025-01-06 DIAGNOSIS — S52.592A OTHER FRACTURES OF LOWER END OF LEFT RADIUS, INITIAL ENCOUNTER FOR CLOSED FRACTURE: ICD-10-CM

## 2025-01-06 PROCEDURE — 73110 X-RAY EXAM OF WRIST: CPT | Mod: LT

## 2025-01-06 PROCEDURE — 99204 OFFICE O/P NEW MOD 45 MIN: CPT | Mod: 25

## 2025-01-06 PROCEDURE — 29075 APPL CST ELBW FNGR SHORT ARM: CPT | Mod: RT

## 2025-01-22 ENCOUNTER — APPOINTMENT (OUTPATIENT)
Dept: ORTHOPEDIC SURGERY | Facility: CLINIC | Age: 65
End: 2025-01-22
Payer: OTHER MISCELLANEOUS

## 2025-01-22 DIAGNOSIS — S52.592A OTHER FRACTURES OF LOWER END OF LEFT RADIUS, INITIAL ENCOUNTER FOR CLOSED FRACTURE: ICD-10-CM

## 2025-01-22 DIAGNOSIS — M25.532 PAIN IN LEFT WRIST: ICD-10-CM

## 2025-01-22 PROCEDURE — 99213 OFFICE O/P EST LOW 20 MIN: CPT

## 2025-01-22 PROCEDURE — 73110 X-RAY EXAM OF WRIST: CPT | Mod: LT

## 2025-02-26 ENCOUNTER — APPOINTMENT (OUTPATIENT)
Dept: ORTHOPEDIC SURGERY | Facility: CLINIC | Age: 65
End: 2025-02-26
Payer: OTHER MISCELLANEOUS

## 2025-02-26 VITALS — WEIGHT: 124 LBS | HEIGHT: 60 IN | BODY MASS INDEX: 24.35 KG/M2

## 2025-02-26 DIAGNOSIS — S52.592A OTHER FRACTURES OF LOWER END OF LEFT RADIUS, INITIAL ENCOUNTER FOR CLOSED FRACTURE: ICD-10-CM

## 2025-02-26 PROCEDURE — 99213 OFFICE O/P EST LOW 20 MIN: CPT

## 2025-02-26 PROCEDURE — 73110 X-RAY EXAM OF WRIST: CPT | Mod: LT

## 2025-03-10 ENCOUNTER — APPOINTMENT (OUTPATIENT)
Dept: ELECTROPHYSIOLOGY | Facility: CLINIC | Age: 65
End: 2025-03-10

## 2025-04-25 ENCOUNTER — APPOINTMENT (OUTPATIENT)
Dept: ELECTROPHYSIOLOGY | Facility: CLINIC | Age: 65
End: 2025-04-25

## 2025-05-14 ENCOUNTER — APPOINTMENT (OUTPATIENT)
Dept: ORTHOPEDIC SURGERY | Facility: CLINIC | Age: 65
End: 2025-05-14
Payer: OTHER MISCELLANEOUS

## 2025-05-14 DIAGNOSIS — M18.0 BILATERAL PRIMARY OSTEOARTHRITIS OF FIRST CARPOMETACARPAL JOINTS: ICD-10-CM

## 2025-05-14 DIAGNOSIS — S52.592A OTHER FRACTURES OF LOWER END OF LEFT RADIUS, INITIAL ENCOUNTER FOR CLOSED FRACTURE: ICD-10-CM

## 2025-05-14 PROCEDURE — 73110 X-RAY EXAM OF WRIST: CPT | Mod: LT

## 2025-05-14 PROCEDURE — 73130 X-RAY EXAM OF HAND: CPT | Mod: RT

## 2025-05-14 PROCEDURE — 99214 OFFICE O/P EST MOD 30 MIN: CPT

## 2025-05-19 NOTE — ASSESSMENT
[FreeTextEntry1] : Plan:\par Recommend pt complete course of abx prescribed however unlikely active E. Coli infection since pt reports episode of acute diarrhea has resolved. Symptoms could be biliary in nature, would recommend CT to evaluate. 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 Discussed importance of soft regular bowel movements for IBS to prevent fluctuations between constipation and diarrhea. Would recommend daily fiber regimen such as metamucil or benefiber, if pt not having daily bowel movements can escalate to miralax regimen. Can also offer hyoscyamine to help with intermittent abdominal pain. Will also plan for colonoscopy to evaluate. Risks versus benefits as well as instructions reviewed, pt agrees to planned procedure. Pt expressed understanding of plan, all questions answered. Discussed with Dr. Burden, I have spent 60 minutes on this encounter. 
[facility]  [facility]  Scheduled Appointment: 06/04/2025

## 2025-06-16 NOTE — ASU PATIENT PROFILE, ADULT - FALL HARM RISK - UNIVERSAL INTERVENTIONS
Bed in lowest position, wheels locked, appropriate side rails in place/Call bell, personal items and telephone in reach/Instruct patient to call for assistance before getting out of bed or chair/Non-slip footwear when patient is out of bed/[place] to call system/Physically safe environment - no spills, clutter or unnecessary equipment/Purposeful Proactive Rounding/Room/bathroom lighting operational, light cord in reach

## 2025-06-17 ENCOUNTER — OUTPATIENT (OUTPATIENT)
Dept: OUTPATIENT SERVICES | Facility: HOSPITAL | Age: 65
LOS: 1 days | Discharge: HOME | End: 2025-06-17
Payer: MEDICARE

## 2025-06-17 VITALS
RESPIRATION RATE: 16 BRPM | DIASTOLIC BLOOD PRESSURE: 90 MMHG | SYSTOLIC BLOOD PRESSURE: 119 MMHG | OXYGEN SATURATION: 100 % | HEART RATE: 65 BPM | TEMPERATURE: 97 F

## 2025-06-17 VITALS
DIASTOLIC BLOOD PRESSURE: 77 MMHG | WEIGHT: 123.9 LBS | OXYGEN SATURATION: 100 % | SYSTOLIC BLOOD PRESSURE: 123 MMHG | RESPIRATION RATE: 15 BRPM | HEART RATE: 67 BPM | TEMPERATURE: 97 F

## 2025-06-17 PROBLEM — Z78.9 OTHER SPECIFIED HEALTH STATUS: Chronic | Status: INACTIVE | Noted: 2021-02-04 | Resolved: 2025-06-17

## 2025-06-17 PROCEDURE — 33286 RMVL SUBQ CAR RHYTHM MNTR: CPT

## 2025-06-17 NOTE — ASU PREOP CHECKLIST - AS BP NONINV SITE
"Daily Note     Today's date: 7/15/2024  Patient name: Sanam Castro  : 1954  MRN: [id number]  Referring provider: Dia Redman MD  Dx:   Encounter Diagnosis     ICD-10-CM    1. Cerebrovascular accident (CVA), unspecified mechanism (HCC)  I63.9                      Subjective: \"I even made a sandwich by myself with no problems at all.  I am using a pairing knife again.\"       Objective: See treatment description below    Thera Act: Pt began tx session with manipulating tennis ball around square formation on mirror at eye level and OH level to challenge in-hand manipulation, gross motor and fine motor control of LUE, and improved speed of motor functions with LUE/hand.    Pt advanced to performing target taps to BITS screen with LUE to challenge hand to target precision, increased reaction time, and improved gross motor control of LUE.  Pt completed x three 3-minute trials of tapping circles on screen in various planes with added central fixation:    Trial #1: 73.03% accuracy; 2.77 seconds reaction time; 65 accurate hits  Trial #2: 67.78% accuracy; 2.92 seconds reaction time; 61 accurate hits  Trial #3: 73.86% accuracy; 2.76 seconds reaction time; 65 accurate hits    Pt concluded tx session with in-hand manipulation task of rotating x 2 golf balls unilaterally in L hand only to improve speed and accuracy of in-hand manipulation abilities.    Assessment: Tolerated treatment well. Patient would benefit from continued OT    Pt demo with Max difficulties manipulating tennis around square formation on mirror with tendencies of slide ball utilizing palm verses utilizing thumb/digits-- Pt continues to present with Max difficulties coordinating thumb and digits affecting fine motor control/manipulation/dexterity. Pt completed manipulation of ball x 15 minutes with x 3 droppage evident throughout. Pt presents with ongoing inconsistent hand to target precision with occasional increase in ataxia when noticing " increased fatigue levels affecting overall gross motor control.  Pt demo with improved speed and accuracy of full rotation of x 2 golf balls unilaterally in L hand without increased speed as activity persisted and only one droppage evident.     Plan: Continue per plan of care.          INTERVENTION COMMENTS:  Diagnosis: Cerebrovascular accident (CVA), unspecified mechanism (HCC) [I63.9]  Precautions: fall risk  Insurance: Payor: [employer]  REP / Plan: [employer] MEDICARE ADVANTRA / Product Type: Medicare HMO /   5 of 10 visits, PN due 7/27/2024      right upper arm

## 2025-06-17 NOTE — ASU PREOP CHECKLIST - DENTURES
She stated she used the cream in the past and it worked better than the patch but it just became too expensive    She is willing to try the cream again once she finds out the cost  no

## 2025-06-17 NOTE — PROCEDURAL SAFETY CHECKLIST WITH OR WITHOUT SEDATION - NSPREPROCDATE6_GEN_ALL_CORE
Physical Therapy   Initial Evaluation     Patient Name: Az Tripp  Age:  87 y.o., Sex:  female  Medical Record #: [id number]  Today's Date: 2/6/2023     Precautions  Precautions: Fall Risk  Comments: chronic R sided deficits    Assessment  Patient is 87 y.o. female admitted with B LE swelling and CVA work-up for R sided deficits. Pt found to have acute pulmonary edema, B pleural effusions, acute diastolic CHF, and afib.  PMH includes a flutter, pulmonary hypertension, CVA with residual R sided deficits, lumbar radiculopathy, Wegener granulomatosis. Pt lives at [address] [place] [address] and is typically independent with basic mobility using her 4WW but reports she hasn't been as ambulatory since symptom onset. She currently requires min assist with bed mobility, STS, and 3ft of gait to chair with FWW. Pt dragging R LE when stepping and required assist with placing R UE onto FWW. PT will cont while pt is in acute care setting to address strength, balance, activity tolerance, ROM, and mobility.     Plan    Physical Therapy Initial Treatment Plan   Treatment Plan : Bed Mobility, Gait Training, Neuro Re-Education / Balance, Self Care / Home Evaluation, Therapeutic Activities, Therapeutic Exercise  Treatment Frequency: 3 Times per Week  Duration: Until Therapy Goals Met    DC Equipment Recommendations: Unable to determine at this time  Discharge Recommendations: Recommend post-acute placement for additional physical therapy services prior to discharge home          02/06/23 0910   Prior Living Situation   Prior Services Housekeeping / Homemaker Services;Other (Comments);Home With Outpatient Therapy  ([facility] services)   Housing / Facility Assisted Living Residence   Equipment Owned 4-Wheel Walker   Lives with - Patient's Self Care Capacity Alone and Able to Care For Self   Comments pt lives at [address] [place] [address]; she was primarily independent and did attend PT   Prior Level of Functional Mobility   Bed Mobility Independent   Transfer Status  Independent   Ambulation Independent   Distance Ambulation (Feet)   (household distances)   Assistive Devices Used 4-Wheel Walker   Comments pt reports being indepedent with mobility prior to symptom onset   Cognition    Cognition / Consciousness WDL   Comments cooperative   Active ROM Lower Body    Active ROM Lower Body  X   Comments R LE limited in end range due to weakness   Strength Lower Body   Lower Body Strength  X   Comments R LE grossly 3/5   Other Treatments   Other Treatments Provided pt reporting R sided deficits are chronic   Balance Assessment   Sitting Balance (Static) Fair   Sitting Balance (Dynamic) Fair   Standing Balance (Static) Fair -   Standing Balance (Dynamic) Poor +   Weight Shift Sitting Fair   Weight Shift Standing Fair   Comments FWW   Bed Mobility    Supine to Sit Minimal Assist   Scooting Minimal Assist   Comments HOB raised   Gait Analysis   Gait Level Of Assist Minimal Assist   Assistive Device Front Wheel Walker   Distance (Feet) 3   # of Times Distance was Traveled 1   Deviation Other (Comment)  (R LE drag)   Weight Bearing Status no restrictions   Comments R LE drag   Functional Mobility   Sit to Stand Minimal Assist   Bed, Chair, Wheelchair Transfer Minimal Assist   Transfer Method Stand Step   Mobility EOB, STS, steps to chair   Edema / Skin Assessment   Edema / Skin  Not Assessed   Patient / Family Goals    Patient / Family Goal #1 none stated   Short Term Goals    Short Term Goal # 1 pt will be able to complete supine<>sitting with SPV in 6tx in order to dc back to [facility]   Short Term Goal # 2 pt will be able to complete functional transfers with FWW and SPV in 6tx in order to dc home   Short Term Goal # 3 pt will be able to ambulate 50ft with FWW and SPV in 6tx in order to dc to [facility]   Anticipated Discharge Equipment and Recommendations   DC Equipment Recommendations Unable to determine at this time   Discharge Recommendations Recommend post-acute placement for additional physical  therapy services prior to discharge home        17-Jun-2025 10:46

## 2025-06-17 NOTE — PROCEDURAL SAFETY CHECKLIST WITH OR WITHOUT SEDATION - NSPOSTCOMMENTFT_GEN_ALL_CORE
PATIENT WITH ILR EXPLANT PERFORMED BY DR. WEIR. VSS, ROS AS CHARTED, REMAINS AS PREVIOUSLY ASSESSED, SEE FLOWSHEET FOR DETAILS. WOUND CLASS LEVEL 1, BLOOD LOSS LESS THAN 5 ML, WOUND WELL APPROXIMATED, DERMABOND AND SUTURES INTACT. PATIENT GIVEN POST PROCEDURE INSTRUCTIONS BY RN, PATIENT VERBALIZED UNDERSTANDING, NO FURTHER QUESTIONS. PATIENT ESCORTED HOME BY .

## 2025-06-20 DIAGNOSIS — I48.91 UNSPECIFIED ATRIAL FIBRILLATION: ICD-10-CM

## 2025-07-01 ENCOUNTER — APPOINTMENT (OUTPATIENT)
Dept: ELECTROPHYSIOLOGY | Facility: CLINIC | Age: 65
End: 2025-07-01

## 2025-07-01 VITALS
OXYGEN SATURATION: 100 % | BODY MASS INDEX: 24.54 KG/M2 | HEIGHT: 60 IN | WEIGHT: 125 LBS | DIASTOLIC BLOOD PRESSURE: 78 MMHG | HEART RATE: 57 BPM | SYSTOLIC BLOOD PRESSURE: 118 MMHG

## 2025-07-01 PROBLEM — Z95.818 STATUS POST PLACEMENT OF IMPLANTABLE LOOP RECORDER: Status: RESOLVED | Noted: 2021-02-23 | Resolved: 2025-07-01

## 2025-07-01 PROBLEM — Z86.73 HISTORY OF CVA (CEREBROVASCULAR ACCIDENT): Status: ACTIVE | Noted: 2025-07-01

## 2025-07-01 PROCEDURE — 99212 OFFICE O/P EST SF 10 MIN: CPT

## 2025-07-03 NOTE — PHYSICAL THERAPY INITIAL EVALUATION ADULT - SITTING BALANCE: STATIC
Pt requesting refill on meloxicam (MOBIC) 15 MG tablet  sent to the pharmacy at -    [address] - [address] [place] [address] - P [phone] - f [phone]       
good balance
good balance

## 2025-08-14 ENCOUNTER — NON-APPOINTMENT (OUTPATIENT)
Age: 65
End: 2025-08-14

## 2025-08-14 ENCOUNTER — APPOINTMENT (OUTPATIENT)
Dept: GASTROENTEROLOGY | Facility: CLINIC | Age: 65
End: 2025-08-14
Payer: MEDICARE

## 2025-08-14 VITALS
BODY MASS INDEX: 24.35 KG/M2 | DIASTOLIC BLOOD PRESSURE: 70 MMHG | WEIGHT: 124 LBS | SYSTOLIC BLOOD PRESSURE: 114 MMHG | HEIGHT: 60 IN

## 2025-08-14 DIAGNOSIS — Z12.11 ENCOUNTER FOR SCREENING FOR MALIGNANT NEOPLASM OF COLON: ICD-10-CM

## 2025-08-14 PROCEDURE — 99203 OFFICE O/P NEW LOW 30 MIN: CPT
